# Patient Record
Sex: FEMALE | Race: WHITE | NOT HISPANIC OR LATINO | Employment: OTHER | URBAN - NONMETROPOLITAN AREA
[De-identification: names, ages, dates, MRNs, and addresses within clinical notes are randomized per-mention and may not be internally consistent; named-entity substitution may affect disease eponyms.]

---

## 2018-02-22 ENCOUNTER — HOSPITAL ENCOUNTER (EMERGENCY)
Facility: HOSPITAL | Age: 68
Discharge: HOME | End: 2018-02-22
Attending: EMERGENCY MEDICINE | Admitting: EMERGENCY MEDICINE

## 2018-02-22 ENCOUNTER — APPOINTMENT (OUTPATIENT)
Dept: GENERAL RADIOLOGY | Facility: HOSPITAL | Age: 68
End: 2018-02-22

## 2018-02-22 VITALS
RESPIRATION RATE: 16 BRPM | DIASTOLIC BLOOD PRESSURE: 82 MMHG | TEMPERATURE: 98 F | OXYGEN SATURATION: 98 % | SYSTOLIC BLOOD PRESSURE: 148 MMHG | HEART RATE: 72 BPM

## 2018-02-22 DIAGNOSIS — S20.212A CONTUSION OF RIB ON LEFT SIDE, INITIAL ENCOUNTER: ICD-10-CM
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
## 2018-02-22 PROCEDURE — 71101 X-RAY EXAM UNILAT RIBS/CHEST: CPT

## 2018-02-22 PROCEDURE — 99282 EMERGENCY DEPT VISIT SF MDM: CPT

## 2018-11-15 ENCOUNTER — APPOINTMENT (OUTPATIENT)
Dept: GENERAL RADIOLOGY | Facility: HOSPITAL | Age: 68
End: 2018-11-15

## 2018-11-15 ENCOUNTER — HOSPITAL ENCOUNTER (EMERGENCY)
Facility: HOSPITAL | Age: 68
Discharge: HOME | End: 2018-11-15
Attending: EMERGENCY MEDICINE | Admitting: EMERGENCY MEDICINE

## 2018-11-15 VITALS
TEMPERATURE: 97.7 F | RESPIRATION RATE: 18 BRPM | WEIGHT: 161.6 LBS | HEIGHT: 68 IN | DIASTOLIC BLOOD PRESSURE: 82 MMHG | SYSTOLIC BLOOD PRESSURE: 157 MMHG | HEART RATE: 80 BPM | OXYGEN SATURATION: 91 % | BODY MASS INDEX: 24.49 KG/M2

## 2018-11-15 DIAGNOSIS — S52.502A CLOSED FRACTURE OF DISTAL END OF LEFT RADIUS, UNSPECIFIED FRACTURE MORPHOLOGY, INITIAL ENCOUNTER: Primary | ICD-10-CM

## 2018-11-15 PROCEDURE — 99283 EMERGENCY DEPT VISIT LOW MDM: CPT

## 2018-11-15 PROCEDURE — 73110 X-RAY EXAM OF WRIST: CPT

## 2018-11-15 PROCEDURE — 73562 X-RAY EXAM OF KNEE 3: CPT

## 2018-11-15 RX ORDER — OXYCODONE HYDROCHLORIDE AND ACETAMINOPHEN 5; 325 MG/1; MG/1
1 TABLET ORAL ONCE
Status: COMPLETED | OUTPATIENT
Start: 2018-11-15 | End: 2018-11-15

## 2018-11-15 RX ORDER — LIDOCAINE HYDROCHLORIDE 10 MG/ML
10 INJECTION, SOLUTION INFILTRATION; PERINEURAL ONCE
Status: COMPLETED | OUTPATIENT
Start: 2018-11-15 | End: 2018-11-15

## 2018-11-15 RX ORDER — OXYCODONE HYDROCHLORIDE AND ACETAMINOPHEN 5; 325 MG/1; MG/1
1 TABLET ORAL EVERY 6 HOURS PRN
Qty: 12 TABLET | Refills: 0 | Status: ON HOLD | OUTPATIENT
Start: 2018-11-15 | End: 2019-09-13

## 2018-11-15 RX ADMIN — OXYCODONE HYDROCHLORIDE AND ACETAMINOPHEN 1 TABLET: 5; 325 TABLET ORAL at 17:07

## 2018-11-15 RX ADMIN — LIDOCAINE HYDROCHLORIDE 10 ML: 10 INJECTION, SOLUTION INFILTRATION; PERINEURAL at 18:06

## 2018-11-16 ENCOUNTER — HOSPITAL ENCOUNTER (OUTPATIENT)
Dept: GENERAL RADIOLOGY | Facility: HOSPITAL | Age: 68
Discharge: HOME | End: 2018-11-16
Admitting: PHYSICIAN ASSISTANT

## 2018-11-16 ENCOUNTER — ANESTHESIA EVENT (OUTPATIENT)
Dept: PERIOP | Facility: HOSPITAL | Age: 68
End: 2018-11-16

## 2018-11-16 ENCOUNTER — TELEPHONE (OUTPATIENT)
Dept: ORTHOPEDIC SURGERY | Facility: CLINIC | Age: 68
End: 2018-11-16

## 2018-11-16 ENCOUNTER — APPOINTMENT (OUTPATIENT)
Dept: PREADMISSION TESTING | Facility: HOSPITAL | Age: 68
End: 2018-11-16

## 2018-11-16 ENCOUNTER — OFFICE VISIT (OUTPATIENT)
Dept: ORTHOPEDIC SURGERY | Facility: CLINIC | Age: 68
End: 2018-11-16

## 2018-11-16 ENCOUNTER — PREP FOR SURGERY (OUTPATIENT)
Dept: OTHER | Facility: HOSPITAL | Age: 68
End: 2018-11-16

## 2018-11-16 VITALS
BODY MASS INDEX: 24.55 KG/M2 | DIASTOLIC BLOOD PRESSURE: 63 MMHG | OXYGEN SATURATION: 94 % | WEIGHT: 162 LBS | HEART RATE: 63 BPM | SYSTOLIC BLOOD PRESSURE: 127 MMHG | HEIGHT: 68 IN

## 2018-11-16 VITALS — HEIGHT: 68 IN | WEIGHT: 162 LBS | BODY MASS INDEX: 24.55 KG/M2 | RESPIRATION RATE: 18 BRPM

## 2018-11-16 DIAGNOSIS — S52.532A CLOSED COLLES' FRACTURE OF LEFT RADIUS, INITIAL ENCOUNTER: ICD-10-CM

## 2018-11-16 DIAGNOSIS — S69.92XA INJURY OF LEFT WRIST, INITIAL ENCOUNTER: ICD-10-CM
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
## 2018-11-16 DIAGNOSIS — S52.615A CLOSED NONDISPLACED FRACTURE OF STYLOID PROCESS OF LEFT ULNA, INITIAL ENCOUNTER: Primary | ICD-10-CM

## 2018-11-16 DIAGNOSIS — S52.532A CLOSED COLLES' FRACTURE OF LEFT RADIUS, INITIAL ENCOUNTER: Primary | ICD-10-CM

## 2018-11-16 LAB
ALBUMIN SERPL-MCNC: 4.8 G/DL (ref 3.5–5)
ALBUMIN/GLOB SERPL: 1.6 G/DL (ref 1–2)
ALP SERPL-CCNC: 116 U/L (ref 38–126)
ALT SERPL W P-5'-P-CCNC: 34 U/L (ref 13–69)
AMORPH URATE CRY URNS QL MICRO: ABNORMAL /HPF
ANION GAP SERPL CALCULATED.3IONS-SCNC: 10.2 MMOL/L (ref 10–20)
AST SERPL-CCNC: 38 U/L (ref 15–46)
BACTERIA UR QL AUTO: ABNORMAL /HPF
BASOPHILS # BLD AUTO: 0.05 10*3/MM3 (ref 0–0.2)
BASOPHILS NFR BLD AUTO: 0.5 % (ref 0–2.5)
BILIRUB SERPL-MCNC: 1.3 MG/DL (ref 0.2–1.3)
BILIRUB UR QL STRIP: ABNORMAL
BUN BLD-MCNC: 12 MG/DL (ref 7–20)
BUN/CREAT SERPL: 20 (ref 7.1–23.5)
CALCIUM SPEC-SCNC: 9.3 MG/DL (ref 8.4–10.2)
CHLORIDE SERPL-SCNC: 100 MMOL/L (ref 98–107)
CLARITY UR: ABNORMAL
CO2 SERPL-SCNC: 33 MMOL/L (ref 26–30)
COLOR UR: YELLOW
CREAT BLD-MCNC: 0.6 MG/DL (ref 0.6–1.3)
DEPRECATED RDW RBC AUTO: 44.5 FL (ref 37–54)
EOSINOPHIL # BLD AUTO: 0.13 10*3/MM3 (ref 0–0.7)
EOSINOPHIL NFR BLD AUTO: 1.3 % (ref 0–7)
ERYTHROCYTE [DISTWIDTH] IN BLOOD BY AUTOMATED COUNT: 13.7 % (ref 11.5–14.5)
GFR SERPL CREATININE-BSD FRML MDRD: 99 ML/MIN/1.73
GLOBULIN UR ELPH-MCNC: 3 GM/DL
GLUCOSE BLD-MCNC: 112 MG/DL (ref 74–98)
GLUCOSE UR STRIP-MCNC: NEGATIVE MG/DL
HCT VFR BLD AUTO: 47.3 % (ref 37–47)
HGB BLD-MCNC: 14.8 G/DL (ref 12–16)
HGB UR QL STRIP.AUTO: ABNORMAL
HYALINE CASTS UR QL AUTO: ABNORMAL /LPF
IMM GRANULOCYTES # BLD: 0.03 10*3/MM3 (ref 0–0.06)
IMM GRANULOCYTES NFR BLD: 0.3 % (ref 0–0.6)
KETONES UR QL STRIP: NEGATIVE
LEUKOCYTE ESTERASE UR QL STRIP.AUTO: NEGATIVE
LYMPHOCYTES # BLD AUTO: 0.96 10*3/MM3 (ref 0.6–3.4)
LYMPHOCYTES NFR BLD AUTO: 9.8 % (ref 10–50)
MCH RBC QN AUTO: 27.8 PG (ref 27–31)
MCHC RBC AUTO-ENTMCNC: 31.3 G/DL (ref 30–37)
MCV RBC AUTO: 88.9 FL (ref 81–99)
MONOCYTES # BLD AUTO: 0.68 10*3/MM3 (ref 0–0.9)
MONOCYTES NFR BLD AUTO: 6.9 % (ref 0–12)
NEUTROPHILS # BLD AUTO: 7.99 10*3/MM3 (ref 2–6.9)
NEUTROPHILS NFR BLD AUTO: 81.2 % (ref 37–80)
NITRITE UR QL STRIP: NEGATIVE
NRBC BLD MANUAL-RTO: 0 /100 WBC (ref 0–0)
PH UR STRIP.AUTO: 5.5 [PH] (ref 5–8)
PLATELET # BLD AUTO: 209 10*3/MM3 (ref 130–400)
PMV BLD AUTO: 10.7 FL (ref 6–12)
POTASSIUM BLD-SCNC: 4.2 MMOL/L (ref 3.5–5.1)
PROT SERPL-MCNC: 7.8 G/DL (ref 6.3–8.2)
PROT UR QL STRIP: NEGATIVE
RBC # BLD AUTO: 5.32 10*6/MM3 (ref 4.2–5.4)
RBC # UR: ABNORMAL /HPF
REF LAB TEST METHOD: ABNORMAL
SODIUM BLD-SCNC: 139 MMOL/L (ref 137–145)
SP GR UR STRIP: >=1.03 (ref 1–1.03)
SQUAMOUS #/AREA URNS HPF: ABNORMAL /HPF
UROBILINOGEN UR QL STRIP: ABNORMAL
WBC NRBC COR # BLD: 9.84 10*3/MM3 (ref 4.8–10.8)
WBC UR QL AUTO: ABNORMAL /HPF

## 2018-11-16 PROCEDURE — 71046 X-RAY EXAM CHEST 2 VIEWS: CPT

## 2018-11-16 PROCEDURE — 85025 COMPLETE CBC W/AUTO DIFF WBC: CPT | Performed by: PHYSICIAN ASSISTANT

## 2018-11-16 PROCEDURE — 80053 COMPREHEN METABOLIC PANEL: CPT | Performed by: PHYSICIAN ASSISTANT

## 2018-11-16 PROCEDURE — 99203 OFFICE O/P NEW LOW 30 MIN: CPT | Performed by: PHYSICIAN ASSISTANT

## 2018-11-16 PROCEDURE — 93005 ELECTROCARDIOGRAM TRACING: CPT | Performed by: PHYSICIAN ASSISTANT

## 2018-11-16 PROCEDURE — 87081 CULTURE SCREEN ONLY: CPT | Performed by: PHYSICIAN ASSISTANT

## 2018-11-16 PROCEDURE — 29105 APPLICATION LONG ARM SPLINT: CPT | Performed by: PHYSICIAN ASSISTANT

## 2018-11-16 PROCEDURE — 36415 COLL VENOUS BLD VENIPUNCTURE: CPT

## 2018-11-16 PROCEDURE — 81001 URINALYSIS AUTO W/SCOPE: CPT | Performed by: PHYSICIAN ASSISTANT

## 2018-11-16 RX ORDER — PAROXETINE HYDROCHLORIDE 20 MG/1
20 TABLET, FILM COATED ORAL EVERY MORNING
COMMUNITY
Start: 2012-06-04 | End: 2019-09-19 | Stop reason: SDUPTHER

## 2018-11-16 RX ORDER — WARFARIN SODIUM 2 MG/1
2 TABLET ORAL SEE ADMIN INSTRUCTIONS
Status: ON HOLD | COMMUNITY
End: 2019-09-17 | Stop reason: SDUPTHER

## 2018-11-16 RX ORDER — CEFAZOLIN SODIUM 2 G/50ML
2 SOLUTION INTRAVENOUS
Status: CANCELLED | OUTPATIENT
Start: 2018-11-20 | End: 2018-11-21

## 2018-11-18 LAB — MRSA SPEC QL CULT: NORMAL

## 2018-11-19 RX ORDER — HYDROCODONE BITARTRATE AND ACETAMINOPHEN 5; 325 MG/1; MG/1
1 TABLET ORAL EVERY 8 HOURS PRN
Qty: 21 TABLET | Refills: 0 | Status: SHIPPED | OUTPATIENT
Start: 2018-11-19 | End: 2018-12-04 | Stop reason: SDUPTHER

## 2018-11-20 ENCOUNTER — APPOINTMENT (OUTPATIENT)
Dept: GENERAL RADIOLOGY | Facility: HOSPITAL | Age: 68
End: 2018-11-20

## 2018-11-20 ENCOUNTER — ANESTHESIA (OUTPATIENT)
Dept: PERIOP | Facility: HOSPITAL | Age: 68
End: 2018-11-20

## 2018-11-20 ENCOUNTER — HOSPITAL ENCOUNTER (OUTPATIENT)
Facility: HOSPITAL | Age: 68
Setting detail: HOSPITAL OUTPATIENT SURGERY
Discharge: HOME | End: 2018-11-20
Attending: ORTHOPAEDIC SURGERY | Admitting: ORTHOPAEDIC SURGERY

## 2018-11-20 VITALS
DIASTOLIC BLOOD PRESSURE: 90 MMHG | TEMPERATURE: 98.1 F | SYSTOLIC BLOOD PRESSURE: 143 MMHG | HEART RATE: 55 BPM | RESPIRATION RATE: 16 BRPM | OXYGEN SATURATION: 96 %

## 2018-11-20 LAB
BACTERIA UR QL AUTO: ABNORMAL /HPF
BILIRUB UR QL STRIP: ABNORMAL
CLARITY UR: CLEAR
COLOR UR: YELLOW
GLUCOSE UR STRIP-MCNC: NEGATIVE MG/DL
HGB UR QL STRIP.AUTO: ABNORMAL
HYALINE CASTS UR QL AUTO: ABNORMAL /LPF
KETONES UR QL STRIP: NEGATIVE
LEUKOCYTE ESTERASE UR QL STRIP.AUTO: NEGATIVE
MUCOUS THREADS URNS QL MICRO: ABNORMAL /HPF
NITRITE UR QL STRIP: NEGATIVE
PH UR STRIP.AUTO: 5.5 [PH] (ref 5–8)
PROT UR QL STRIP: NEGATIVE
RBC # UR: ABNORMAL /HPF
REF LAB TEST METHOD: ABNORMAL
SP GR UR STRIP: >=1.03 (ref 1–1.03)
SQUAMOUS #/AREA URNS HPF: ABNORMAL /HPF
UROBILINOGEN UR QL STRIP: ABNORMAL
WBC UR QL AUTO: ABNORMAL /HPF

## 2018-11-20 PROCEDURE — 25010000002 FENTANYL CITRATE (PF) 100 MCG/2ML SOLUTION: Performed by: NURSE ANESTHETIST, CERTIFIED REGISTERED

## 2018-11-20 PROCEDURE — C1713 ANCHOR/SCREW BN/BN,TIS/BN: HCPCS | Performed by: ORTHOPAEDIC SURGERY

## 2018-11-20 PROCEDURE — 25606 PERQ SKEL FIXJ DSTL RDL FX: CPT | Performed by: ORTHOPAEDIC SURGERY

## 2018-11-20 PROCEDURE — 25010000003 CEFAZOLIN SODIUM-DEXTROSE 2-3 GM-%(50ML) RECONSTITUTED SOLUTION: Performed by: PHYSICIAN ASSISTANT

## 2018-11-20 PROCEDURE — 81001 URINALYSIS AUTO W/SCOPE: CPT | Performed by: ORTHOPAEDIC SURGERY

## 2018-11-20 PROCEDURE — 25010000002 PROPOFOL 1000 MG/ML EMULSION: Performed by: NURSE ANESTHETIST, CERTIFIED REGISTERED

## 2018-11-20 PROCEDURE — 76000 FLUOROSCOPY <1 HR PHYS/QHP: CPT

## 2018-11-20 PROCEDURE — 25010000002 MIDAZOLAM PER 1 MG: Performed by: NURSE ANESTHETIST, CERTIFIED REGISTERED

## 2018-11-20 DEVICE — KWIRE BAYO .062 9IN: Type: IMPLANTABLE DEVICE | Site: WRIST | Status: FUNCTIONAL

## 2018-11-20 RX ORDER — FENTANYL CITRATE 50 UG/ML
INJECTION, SOLUTION INTRAMUSCULAR; INTRAVENOUS
Status: COMPLETED
Start: 2018-11-20 | End: 2018-11-20

## 2018-11-20 RX ORDER — MIDAZOLAM HYDROCHLORIDE 1 MG/ML
INJECTION INTRAMUSCULAR; INTRAVENOUS
Status: COMPLETED
Start: 2018-11-20 | End: 2018-11-20

## 2018-11-20 RX ORDER — CEFAZOLIN SODIUM 2 G/50ML
2 SOLUTION INTRAVENOUS ONCE
Status: COMPLETED | OUTPATIENT
Start: 2018-11-20 | End: 2018-11-20

## 2018-11-20 RX ORDER — BUPIVACAINE HYDROCHLORIDE 5 MG/ML
INJECTION, SOLUTION EPIDURAL; INTRACAUDAL
Status: DISCONTINUED
Start: 2018-11-20 | End: 2018-11-20 | Stop reason: HOSPADM

## 2018-11-20 RX ORDER — OXYCODONE AND ACETAMINOPHEN 10; 325 MG/1; MG/1
1 TABLET ORAL ONCE AS NEEDED
Status: COMPLETED | OUTPATIENT
Start: 2018-11-20 | End: 2018-11-20

## 2018-11-20 RX ORDER — SODIUM CHLORIDE 0.9 % (FLUSH) 0.9 %
3 SYRINGE (ML) INJECTION AS NEEDED
Status: DISCONTINUED | OUTPATIENT
Start: 2018-11-20 | End: 2018-11-20 | Stop reason: HOSPADM

## 2018-11-20 RX ORDER — MIDAZOLAM HYDROCHLORIDE 1 MG/ML
INJECTION INTRAMUSCULAR; INTRAVENOUS AS NEEDED
Status: DISCONTINUED | OUTPATIENT
Start: 2018-11-20 | End: 2018-11-20 | Stop reason: SURG

## 2018-11-20 RX ORDER — FENTANYL CITRATE 50 UG/ML
INJECTION, SOLUTION INTRAMUSCULAR; INTRAVENOUS AS NEEDED
Status: DISCONTINUED | OUTPATIENT
Start: 2018-11-20 | End: 2018-11-20 | Stop reason: SURG

## 2018-11-20 RX ORDER — SODIUM CHLORIDE, SODIUM LACTATE, POTASSIUM CHLORIDE, CALCIUM CHLORIDE 600; 310; 30; 20 MG/100ML; MG/100ML; MG/100ML; MG/100ML
1000 INJECTION, SOLUTION INTRAVENOUS CONTINUOUS
Status: DISCONTINUED | OUTPATIENT
Start: 2018-11-20 | End: 2018-11-20 | Stop reason: HOSPADM

## 2018-11-20 RX ORDER — BUPIVACAINE HYDROCHLORIDE 5 MG/ML
INJECTION, SOLUTION EPIDURAL; INTRACAUDAL
Status: COMPLETED | OUTPATIENT
Start: 2018-11-20 | End: 2018-11-20

## 2018-11-20 RX ADMIN — SODIUM CHLORIDE, POTASSIUM CHLORIDE, SODIUM LACTATE AND CALCIUM CHLORIDE: 600; 310; 30; 20 INJECTION, SOLUTION INTRAVENOUS at 16:00

## 2018-11-20 RX ADMIN — FENTANYL CITRATE 50 MCG: 50 INJECTION, SOLUTION INTRAMUSCULAR; INTRAVENOUS at 14:43

## 2018-11-20 RX ADMIN — BUPIVACAINE HYDROCHLORIDE 20 ML: 5 INJECTION, SOLUTION EPIDURAL; INTRACAUDAL; PERINEURAL at 14:49

## 2018-11-20 RX ADMIN — MIDAZOLAM HYDROCHLORIDE 1 MG: 1 INJECTION, SOLUTION INTRAMUSCULAR; INTRAVENOUS at 15:48

## 2018-11-20 RX ADMIN — SODIUM CHLORIDE, POTASSIUM CHLORIDE, SODIUM LACTATE AND CALCIUM CHLORIDE 1000 ML: 600; 310; 30; 20 INJECTION, SOLUTION INTRAVENOUS at 12:30

## 2018-11-20 RX ADMIN — FENTANYL CITRATE 50 MCG: 50 INJECTION, SOLUTION INTRAMUSCULAR; INTRAVENOUS at 15:48

## 2018-11-20 RX ADMIN — CEFAZOLIN SODIUM 2 G: 2 SOLUTION INTRAVENOUS at 15:41

## 2018-11-20 RX ADMIN — PROPOFOL 100 MCG/KG/MIN: 10 INJECTION, EMULSION INTRAVENOUS at 15:43

## 2018-11-20 RX ADMIN — MIDAZOLAM HYDROCHLORIDE 1 MG: 1 INJECTION, SOLUTION INTRAMUSCULAR; INTRAVENOUS at 14:43

## 2018-11-20 RX ADMIN — OXYCODONE HYDROCHLORIDE AND ACETAMINOPHEN 1 TABLET: 10; 325 TABLET ORAL at 16:55

## 2018-11-20 RX ADMIN — FAMOTIDINE 20 MG: 10 INJECTION, SOLUTION INTRAVENOUS at 12:40

## 2018-11-27 ENCOUNTER — TELEPHONE (OUTPATIENT)
Dept: ORTHOPEDIC SURGERY | Facility: CLINIC | Age: 68
End: 2018-11-27

## 2018-11-27 ENCOUNTER — OFFICE VISIT (OUTPATIENT)
Dept: ORTHOPEDIC SURGERY | Facility: CLINIC | Age: 68
End: 2018-11-27

## 2018-11-27 VITALS — WEIGHT: 162 LBS | RESPIRATION RATE: 18 BRPM | HEIGHT: 68 IN | BODY MASS INDEX: 24.55 KG/M2

## 2018-11-27 DIAGNOSIS — M25.532 LEFT WRIST PAIN: Primary | ICD-10-CM

## 2018-11-27 DIAGNOSIS — S52.532A CLOSED COLLES' FRACTURE OF LEFT RADIUS, INITIAL ENCOUNTER: ICD-10-CM

## 2018-11-27 DIAGNOSIS — S52.615A CLOSED NONDISPLACED FRACTURE OF STYLOID PROCESS OF LEFT ULNA, INITIAL ENCOUNTER: ICD-10-CM

## 2018-11-27 PROCEDURE — 99024 POSTOP FOLLOW-UP VISIT: CPT | Performed by: PHYSICIAN ASSISTANT

## 2018-12-03 DIAGNOSIS — Z09 FRACTURE FOLLOW-UP: Primary | ICD-10-CM

## 2018-12-04 ENCOUNTER — OFFICE VISIT (OUTPATIENT)
Dept: ORTHOPEDIC SURGERY | Facility: CLINIC | Age: 68
End: 2018-12-04

## 2018-12-04 VITALS — BODY MASS INDEX: 24.55 KG/M2 | HEIGHT: 68 IN | WEIGHT: 162 LBS | RESPIRATION RATE: 18 BRPM

## 2018-12-04 DIAGNOSIS — Z79.2 NEED FOR PROPHYLACTIC ANTIBIOTIC: Primary | ICD-10-CM

## 2018-12-04 DIAGNOSIS — S52.532A CLOSED COLLES' FRACTURE OF LEFT RADIUS, INITIAL ENCOUNTER: ICD-10-CM

## 2018-12-04 DIAGNOSIS — S52.615A CLOSED NONDISPLACED FRACTURE OF STYLOID PROCESS OF LEFT ULNA, INITIAL ENCOUNTER: ICD-10-CM

## 2018-12-04 PROCEDURE — 29125 APPL SHORT ARM SPLINT STATIC: CPT | Performed by: PHYSICIAN ASSISTANT

## 2018-12-04 PROCEDURE — 99024 POSTOP FOLLOW-UP VISIT: CPT | Performed by: PHYSICIAN ASSISTANT

## 2018-12-04 RX ORDER — HYDROCODONE BITARTRATE AND ACETAMINOPHEN 5; 325 MG/1; MG/1
1 TABLET ORAL EVERY 12 HOURS PRN
Qty: 14 TABLET | Refills: 0 | Status: SHIPPED | OUTPATIENT
Start: 2018-12-04 | End: 2019-09-19

## 2018-12-04 RX ORDER — CEPHALEXIN 500 MG/1
500 CAPSULE ORAL EVERY 12 HOURS
Qty: 20 CAPSULE | Refills: 0 | Status: ON HOLD | OUTPATIENT
Start: 2018-12-04 | End: 2019-09-13

## 2018-12-21 DIAGNOSIS — S52.615A CLOSED NONDISPLACED FRACTURE OF STYLOID PROCESS OF LEFT ULNA, INITIAL ENCOUNTER: Primary | ICD-10-CM

## 2018-12-26 ENCOUNTER — OFFICE VISIT (OUTPATIENT)
Dept: ORTHOPEDIC SURGERY | Facility: CLINIC | Age: 68
End: 2018-12-26

## 2018-12-26 VITALS — HEIGHT: 68 IN | WEIGHT: 162 LBS | BODY MASS INDEX: 24.55 KG/M2 | RESPIRATION RATE: 18 BRPM

## 2018-12-26 DIAGNOSIS — S52.502D CLOSED FRACTURE OF DISTAL END OF LEFT RADIUS WITH ROUTINE HEALING, UNSPECIFIED FRACTURE MORPHOLOGY, SUBSEQUENT ENCOUNTER: Primary | ICD-10-CM

## 2018-12-26 DIAGNOSIS — M25.632 WRIST STIFFNESS, LEFT: ICD-10-CM

## 2018-12-26 PROCEDURE — 99024 POSTOP FOLLOW-UP VISIT: CPT | Performed by: PHYSICIAN ASSISTANT

## 2019-01-22 DIAGNOSIS — S52.502D CLOSED FRACTURE OF DISTAL END OF LEFT RADIUS WITH ROUTINE HEALING, UNSPECIFIED FRACTURE MORPHOLOGY, SUBSEQUENT ENCOUNTER: Primary | ICD-10-CM

## 2019-01-31 ENCOUNTER — OFFICE VISIT (OUTPATIENT)
Dept: ORTHOPEDIC SURGERY | Facility: CLINIC | Age: 69
End: 2019-01-31

## 2019-01-31 VITALS — RESPIRATION RATE: 18 BRPM | WEIGHT: 162 LBS | BODY MASS INDEX: 24.55 KG/M2 | HEIGHT: 68 IN

## 2019-01-31 DIAGNOSIS — S52.502D CLOSED FRACTURE OF DISTAL END OF LEFT RADIUS WITH ROUTINE HEALING, UNSPECIFIED FRACTURE MORPHOLOGY, SUBSEQUENT ENCOUNTER: Primary | ICD-10-CM

## 2019-01-31 PROCEDURE — 99024 POSTOP FOLLOW-UP VISIT: CPT | Performed by: PHYSICIAN ASSISTANT

## 2019-04-04 ENCOUNTER — HOSPITAL ENCOUNTER (EMERGENCY)
Facility: HOSPITAL | Age: 69
Discharge: HOME | End: 2019-04-04
Attending: EMERGENCY MEDICINE | Admitting: EMERGENCY MEDICINE

## 2019-04-04 VITALS
SYSTOLIC BLOOD PRESSURE: 133 MMHG | TEMPERATURE: 98 F | HEART RATE: 65 BPM | OXYGEN SATURATION: 90 % | HEIGHT: 68 IN | BODY MASS INDEX: 23.58 KG/M2 | RESPIRATION RATE: 16 BRPM | WEIGHT: 155.6 LBS | DIASTOLIC BLOOD PRESSURE: 61 MMHG

## 2019-04-04 DIAGNOSIS — S80.12XA CONTUSION OF LEFT LOWER EXTREMITY, INITIAL ENCOUNTER: Primary | ICD-10-CM

## 2019-04-04 DIAGNOSIS — R79.1 SUBTHERAPEUTIC INTERNATIONAL NORMALIZED RATIO (INR): ICD-10-CM

## 2019-04-04 LAB
INR PPP: 1.56 (ref 0.9–1.1)
PROTHROMBIN TIME: 19.1 SECONDS (ref 12–15.1)

## 2019-04-04 PROCEDURE — 85610 PROTHROMBIN TIME: CPT | Performed by: EMERGENCY MEDICINE

## 2019-04-04 PROCEDURE — 99283 EMERGENCY DEPT VISIT LOW MDM: CPT

## 2019-04-04 RX ORDER — WARFARIN SODIUM 2 MG/1
2 TABLET ORAL
Status: COMPLETED | OUTPATIENT
Start: 2019-04-04 | End: 2019-04-04

## 2019-04-04 RX ADMIN — WARFARIN SODIUM 2 MG: 2 TABLET ORAL at 16:30

## 2019-09-13 ENCOUNTER — HOSPITAL ENCOUNTER (INPATIENT)
Facility: HOSPITAL | Age: 69
LOS: 4 days | Discharge: HOME | End: 2019-09-17
Attending: EMERGENCY MEDICINE | Admitting: INTERNAL MEDICINE

## 2019-09-13 ENCOUNTER — APPOINTMENT (OUTPATIENT)
Dept: CT IMAGING | Facility: HOSPITAL | Age: 69
End: 2019-09-13

## 2019-09-13 ENCOUNTER — APPOINTMENT (OUTPATIENT)
Dept: GENERAL RADIOLOGY | Facility: HOSPITAL | Age: 69
End: 2019-09-13

## 2019-09-13 ENCOUNTER — APPOINTMENT (OUTPATIENT)
Dept: CARDIOLOGY | Facility: HOSPITAL | Age: 69
End: 2019-09-13

## 2019-09-13 ENCOUNTER — APPOINTMENT (OUTPATIENT)
Dept: ULTRASOUND IMAGING | Facility: HOSPITAL | Age: 69
End: 2019-09-13

## 2019-09-13 DIAGNOSIS — R79.1 SUBTHERAPEUTIC INTERNATIONAL NORMALIZED RATIO (INR): ICD-10-CM
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
## 2019-09-13 DIAGNOSIS — J44.9 COPD, SEVERITY TO BE DETERMINED (HCC): ICD-10-CM

## 2019-09-13 DIAGNOSIS — S22.050A CLOSED WEDGE COMPRESSION FRACTURE OF FIFTH THORACIC VERTEBRA, INITIAL ENCOUNTER: ICD-10-CM

## 2019-09-13 DIAGNOSIS — R06.02 SHORTNESS OF BREATH: ICD-10-CM

## 2019-09-13 DIAGNOSIS — J96.11 CHRONIC RESPIRATORY FAILURE WITH HYPOXIA (HCC): ICD-10-CM

## 2019-09-13 DIAGNOSIS — J44.9 COPD WITH HYPOXIA (HCC): ICD-10-CM

## 2019-09-13 DIAGNOSIS — T82.09XA: ICD-10-CM

## 2019-09-13 DIAGNOSIS — S22.000A CLOSED COMPRESSION FRACTURE OF THORACIC VERTEBRA, INITIAL ENCOUNTER (HCC): ICD-10-CM

## 2019-09-13 DIAGNOSIS — R77.8 ELEVATED TROPONIN: ICD-10-CM

## 2019-09-13 DIAGNOSIS — I21.4 NSTEMI (NON-ST ELEVATED MYOCARDIAL INFARCTION) (HCC): Primary | ICD-10-CM

## 2019-09-13 DIAGNOSIS — S42.292A HUMERAL HEAD FRACTURE, LEFT, CLOSED, INITIAL ENCOUNTER: ICD-10-CM

## 2019-09-13 DIAGNOSIS — R09.02 COPD WITH HYPOXIA (HCC): ICD-10-CM

## 2019-09-13 DIAGNOSIS — R09.02 HYPOXIA: ICD-10-CM

## 2019-09-13 PROBLEM — R79.89 ELEVATED TROPONIN: Status: ACTIVE | Noted: 2019-09-13

## 2019-09-13 LAB
ALBUMIN SERPL-MCNC: 4.6 G/DL (ref 3.5–5.2)
ALBUMIN/GLOB SERPL: 1.4 G/DL
ALP SERPL-CCNC: 115 U/L (ref 39–117)
ALT SERPL W P-5'-P-CCNC: 25 U/L (ref 1–33)
ANION GAP SERPL CALCULATED.3IONS-SCNC: 15.6 MMOL/L (ref 5–15)
APTT PPP: >200 SECONDS (ref 70–100)
AST SERPL-CCNC: 35 U/L (ref 1–32)
BASOPHILS # BLD AUTO: 0.04 10*3/MM3 (ref 0–0.2)
BASOPHILS NFR BLD AUTO: 0.3 % (ref 0–1.5)
BH CV ECHO MEAS - % IVS THICK: 44.4 %
BH CV ECHO MEAS - % LVPW THICK: 14 %
BH CV ECHO MEAS - AO ROOT AREA (BSA CORRECTED): 1.9
BH CV ECHO MEAS - AO ROOT AREA: 9.3 CM^2
BH CV ECHO MEAS - AO ROOT DIAM: 3.5 CM
BH CV ECHO MEAS - BSA(HAYCOCK): 1.9 M^2
BH CV ECHO MEAS - BSA(HAYCOCK): 1.9 M^2
BH CV ECHO MEAS - BSA: 1.8 M^2
BH CV ECHO MEAS - BSA: 1.8 M^2
BH CV ECHO MEAS - BZI_BMI: 24 KILOGRAMS/M^2
BH CV ECHO MEAS - BZI_BMI: 24 KILOGRAMS/M^2
BH CV ECHO MEAS - BZI_METRIC_HEIGHT: 172.7 CM
BH CV ECHO MEAS - BZI_METRIC_HEIGHT: 172.7 CM
BH CV ECHO MEAS - BZI_METRIC_WEIGHT: 71.7 KG
BH CV ECHO MEAS - BZI_METRIC_WEIGHT: 71.7 KG
BH CV ECHO MEAS - EDV(CUBED): 134.6 ML
BH CV ECHO MEAS - EDV(MOD-SP2): 74.9 ML
BH CV ECHO MEAS - EDV(MOD-SP4): 53.9 ML
BH CV ECHO MEAS - EDV(TEICH): 125.2 ML
BH CV ECHO MEAS - EF(CUBED): 60.5 %
BH CV ECHO MEAS - EF(MOD-BP): 57.8 %
BH CV ECHO MEAS - EF(MOD-SP2): 54.1 %
BH CV ECHO MEAS - EF(MOD-SP4): 57.5 %
BH CV ECHO MEAS - EF(TEICH): 51.8 %
BH CV ECHO MEAS - ESV(CUBED): 53.2 ML
BH CV ECHO MEAS - ESV(MOD-SP2): 34.4 ML
BH CV ECHO MEAS - ESV(MOD-SP4): 22.9 ML
BH CV ECHO MEAS - ESV(TEICH): 60.4 ML
BH CV ECHO MEAS - FS: 26.6 %
BH CV ECHO MEAS - IVS/LVPW: 1.3
BH CV ECHO MEAS - IVSD: 1 CM
BH CV ECHO MEAS - IVSS: 1.7 CM
BH CV ECHO MEAS - LA DIMENSION: 4.4 CM
BH CV ECHO MEAS - LA/AO: 1.3
BH CV ECHO MEAS - LAD MAJOR: 5.4 CM
BH CV ECHO MEAS - LAT PEAK E' VEL: 8.4 CM/SEC
BH CV ECHO MEAS - LATERAL E/E' RATIO: 9.4
BH CV ECHO MEAS - LV DIASTOLIC VOL/BSA (35-75): 29.1 ML/M^2
BH CV ECHO MEAS - LV MASS(C)D: 200.1 GRAMS
BH CV ECHO MEAS - LV MASS(C)DI: 108.2 GRAMS/M^2
BH CV ECHO MEAS - LV MASS(C)S: 183.8 GRAMS
BH CV ECHO MEAS - LV MASS(C)SI: 99.4 GRAMS/M^2
BH CV ECHO MEAS - LV SYSTOLIC VOL/BSA (12-30): 12.4 ML/M^2
BH CV ECHO MEAS - LVIDD: 5.1 CM
BH CV ECHO MEAS - LVIDS: 3.8 CM
BH CV ECHO MEAS - LVLD AP2: 6 CM
BH CV ECHO MEAS - LVLD AP4: 6.8 CM
BH CV ECHO MEAS - LVLS AP2: 6 CM
BH CV ECHO MEAS - LVLS AP4: 5.6 CM
BH CV ECHO MEAS - LVOT AREA (M): 3.1 CM^2
BH CV ECHO MEAS - LVOT AREA: 3.1 CM^2
BH CV ECHO MEAS - LVOT DIAM: 2 CM
BH CV ECHO MEAS - LVPWD: 0.91 CM
BH CV ECHO MEAS - LVPWS: 1 CM
BH CV ECHO MEAS - MED PEAK E' VEL: 4.9 CM/SEC
BH CV ECHO MEAS - MEDIAL E/E' RATIO: 16
BH CV ECHO MEAS - MV A MAX VEL: 121 CM/SEC
BH CV ECHO MEAS - MV DEC SLOPE: 429 CM/SEC^2
BH CV ECHO MEAS - MV DEC TIME: 0.18 SEC
BH CV ECHO MEAS - MV E MAX VEL: 78.6 CM/SEC
BH CV ECHO MEAS - MV E/A: 0.65
BH CV ECHO MEAS - MV MAX PG: 6.8 MMHG
BH CV ECHO MEAS - MV MEAN PG: 2 MMHG
BH CV ECHO MEAS - MV V2 MAX: 130 CM/SEC
BH CV ECHO MEAS - MV V2 MEAN: 67 CM/SEC
BH CV ECHO MEAS - MV V2 VTI: 22.3 CM
BH CV ECHO MEAS - PA ACC TIME: 0.08 SEC
BH CV ECHO MEAS - PA MAX PG: 1.3 MMHG
BH CV ECHO MEAS - PA MEAN PG: 1 MMHG
BH CV ECHO MEAS - PA PR(ACCEL): 42.6 MMHG
BH CV ECHO MEAS - PA V2 MAX: 56.2 CM/SEC
BH CV ECHO MEAS - PA V2 MEAN: 43.2 CM/SEC
BH CV ECHO MEAS - PA V2 VTI: 11.3 CM
BH CV ECHO MEAS - SI(CUBED): 44.1 ML/M^2
BH CV ECHO MEAS - SI(MOD-SP2): 21.9 ML/M^2
BH CV ECHO MEAS - SI(MOD-SP4): 16.8 ML/M^2
BH CV ECHO MEAS - SI(TEICH): 35.1 ML/M^2
BH CV ECHO MEAS - SV(CUBED): 81.5 ML
BH CV ECHO MEAS - SV(MOD-SP2): 40.5 ML
BH CV ECHO MEAS - SV(MOD-SP4): 31 ML
BH CV ECHO MEAS - SV(TEICH): 64.8 ML
BH CV ECHO MEAS - TAPSE (>1.6): 1.5 CM2
BH CV ECHO MEAS - TR MAX PG: 6 MMHG
BH CV ECHO MEAS - TR MAX VEL: 122.1 CM/SEC
BH CV ECHO MEASUREMENTS AVERAGE E/E' RATIO: 11.82
BILIRUB SERPL-MCNC: 0.9 MG/DL (ref 0.2–1.2)
BUN BLD-MCNC: 11 MG/DL (ref 8–23)
BUN/CREAT SERPL: 15.9 (ref 7–25)
CALCIUM SPEC-SCNC: 9.3 MG/DL (ref 8.6–10.5)
CHLORIDE SERPL-SCNC: 102 MMOL/L (ref 98–107)
CHOLEST SERPL-MCNC: 185 MG/DL (ref 0–200)
CO2 SERPL-SCNC: 27.4 MMOL/L (ref 22–29)
CREAT BLD-MCNC: 0.69 MG/DL (ref 0.57–1)
D DIMER PPP FEU-MCNC: 16.01 MCGFEU/ML (ref 0–0.57)
DEPRECATED RDW RBC AUTO: 43.1 FL (ref 37–54)
EOSINOPHIL # BLD AUTO: 0.03 10*3/MM3 (ref 0–0.4)
EOSINOPHIL NFR BLD AUTO: 0.2 % (ref 0.3–6.2)
ERYTHROCYTE [DISTWIDTH] IN BLOOD BY AUTOMATED COUNT: 13.3 % (ref 12.3–15.4)
GFR SERPL CREATININE-BSD FRML MDRD: 84 ML/MIN/1.73
GLOBULIN UR ELPH-MCNC: 3.4 GM/DL
GLUCOSE BLD-MCNC: 117 MG/DL (ref 65–99)
HBA1C MFR BLD: 5.7 % (ref 4.8–5.6)
HCT VFR BLD AUTO: 46.3 % (ref 34–46.6)
HDLC SERPL-MCNC: 55 MG/DL (ref 40–60)
HGB BLD-MCNC: 14.8 G/DL (ref 12–15.9)
IMM GRANULOCYTES # BLD AUTO: 0.05 10*3/MM3 (ref 0–0.05)
IMM GRANULOCYTES NFR BLD AUTO: 0.4 % (ref 0–0.5)
INR PPP: 1.63 (ref 0.9–1.1)
INR PPP: 1.98 (ref 0.9–1.1)
LDLC SERPL CALC-MCNC: 107 MG/DL (ref 0–100)
LDLC/HDLC SERPL: 1.94 {RATIO}
LV EF 2D ECHO EST: 58 %
LV EF 2D ECHO EST: 60 %
LYMPHOCYTES # BLD AUTO: 0.6 10*3/MM3 (ref 0.7–3.1)
LYMPHOCYTES NFR BLD AUTO: 4.8 % (ref 19.6–45.3)
MAXIMAL PREDICTED HEART RATE: 150 BPM
MCH RBC QN AUTO: 28 PG (ref 26.6–33)
MCHC RBC AUTO-ENTMCNC: 32 G/DL (ref 31.5–35.7)
MCV RBC AUTO: 87.7 FL (ref 79–97)
MONOCYTES # BLD AUTO: 0.77 10*3/MM3 (ref 0.1–0.9)
MONOCYTES NFR BLD AUTO: 6.2 % (ref 5–12)
NEUTROPHILS # BLD AUTO: 10.93 10*3/MM3 (ref 1.7–7)
NEUTROPHILS NFR BLD AUTO: 88.1 % (ref 42.7–76)
NRBC BLD AUTO-RTO: 0 /100 WBC (ref 0–0.2)
NT-PROBNP SERPL-MCNC: 365.5 PG/ML (ref 5–900)
PLATELET # BLD AUTO: 181 10*3/MM3 (ref 140–450)
PMV BLD AUTO: 10.5 FL (ref 6–12)
POTASSIUM BLD-SCNC: 3.9 MMOL/L (ref 3.5–5.2)
PROT SERPL-MCNC: 8 G/DL (ref 6–8.5)
PROTHROMBIN TIME: 19.7 SECONDS (ref 12–15.1)
PROTHROMBIN TIME: 23.1 SECONDS (ref 12–15.1)
RBC # BLD AUTO: 5.28 10*6/MM3 (ref 3.77–5.28)
SODIUM BLD-SCNC: 145 MMOL/L (ref 136–145)
STRESS TARGET HR: 128 BPM
TRIGL SERPL-MCNC: 117 MG/DL (ref 0–150)
TROPONIN T SERPL-MCNC: 0.02 NG/ML (ref 0–0.03)
TROPONIN T SERPL-MCNC: 0.05 NG/ML (ref 0–0.03)
TROPONIN T SERPL-MCNC: 0.07 NG/ML (ref 0–0.03)
TROPONIN T SERPL-MCNC: 0.1 NG/ML (ref 0–0.03)
TSH SERPL DL<=0.05 MIU/L-ACNC: 0.56 UIU/ML (ref 0.27–4.2)
VLDLC SERPL-MCNC: 23.4 MG/DL
WBC NRBC COR # BLD: 12.42 10*3/MM3 (ref 3.4–10.8)

## 2019-09-13 PROCEDURE — 84484 ASSAY OF TROPONIN QUANT: CPT | Performed by: EMERGENCY MEDICINE

## 2019-09-13 PROCEDURE — C1769 GUIDE WIRE: HCPCS | Performed by: INTERNAL MEDICINE

## 2019-09-13 PROCEDURE — 80061 LIPID PANEL: CPT | Performed by: FAMILY MEDICINE

## 2019-09-13 PROCEDURE — G0378 HOSPITAL OBSERVATION PER HR: HCPCS

## 2019-09-13 PROCEDURE — 93312 ECHO TRANSESOPHAGEAL: CPT | Performed by: INTERNAL MEDICINE

## 2019-09-13 PROCEDURE — 93306 TTE W/DOPPLER COMPLETE: CPT | Performed by: INTERNAL MEDICINE

## 2019-09-13 PROCEDURE — 85730 THROMBOPLASTIN TIME PARTIAL: CPT | Performed by: INTERNAL MEDICINE

## 2019-09-13 PROCEDURE — 25010000002 ONDANSETRON PER 1 MG: Performed by: FAMILY MEDICINE

## 2019-09-13 PROCEDURE — 71275 CT ANGIOGRAPHY CHEST: CPT

## 2019-09-13 PROCEDURE — 93307 TTE W/O DOPPLER COMPLETE: CPT | Performed by: INTERNAL MEDICINE

## 2019-09-13 PROCEDURE — 93320 DOPPLER ECHO COMPLETE: CPT

## 2019-09-13 PROCEDURE — 71045 X-RAY EXAM CHEST 1 VIEW: CPT

## 2019-09-13 PROCEDURE — 25010000002 FENTANYL CITRATE (PF) 100 MCG/2ML SOLUTION: Performed by: INTERNAL MEDICINE

## 2019-09-13 PROCEDURE — 25010000002 IOPAMIDOL 61 % SOLUTION: Performed by: EMERGENCY MEDICINE

## 2019-09-13 PROCEDURE — 4A023N7 MEASUREMENT OF CARDIAC SAMPLING AND PRESSURE, LEFT HEART, PERCUTANEOUS APPROACH: ICD-10-PCS | Performed by: INTERNAL MEDICINE

## 2019-09-13 PROCEDURE — B2111ZZ FLUOROSCOPY OF MULTIPLE CORONARY ARTERIES USING LOW OSMOLAR CONTRAST: ICD-10-PCS | Performed by: INTERNAL MEDICINE

## 2019-09-13 PROCEDURE — 93454 CORONARY ARTERY ANGIO S&I: CPT | Performed by: INTERNAL MEDICINE

## 2019-09-13 PROCEDURE — 84443 ASSAY THYROID STIM HORMONE: CPT | Performed by: FAMILY MEDICINE

## 2019-09-13 PROCEDURE — 93325 DOPPLER ECHO COLOR FLOW MAPG: CPT | Performed by: INTERNAL MEDICINE

## 2019-09-13 PROCEDURE — 93306 TTE W/DOPPLER COMPLETE: CPT

## 2019-09-13 PROCEDURE — 25010000003 LIDOCAINE 1 % SOLUTION: Performed by: INTERNAL MEDICINE

## 2019-09-13 PROCEDURE — 93880 EXTRACRANIAL BILAT STUDY: CPT

## 2019-09-13 PROCEDURE — 85610 PROTHROMBIN TIME: CPT | Performed by: INTERNAL MEDICINE

## 2019-09-13 PROCEDURE — 0 IOPAMIDOL PER 1 ML: Performed by: INTERNAL MEDICINE

## 2019-09-13 PROCEDURE — 85025 COMPLETE CBC W/AUTO DIFF WBC: CPT | Performed by: EMERGENCY MEDICINE

## 2019-09-13 PROCEDURE — 73030 X-RAY EXAM OF SHOULDER: CPT

## 2019-09-13 PROCEDURE — 99222 1ST HOSP IP/OBS MODERATE 55: CPT | Performed by: INTERNAL MEDICINE

## 2019-09-13 PROCEDURE — 80053 COMPREHEN METABOLIC PANEL: CPT | Performed by: EMERGENCY MEDICINE

## 2019-09-13 PROCEDURE — 83036 HEMOGLOBIN GLYCOSYLATED A1C: CPT | Performed by: FAMILY MEDICINE

## 2019-09-13 PROCEDURE — 94770: CPT

## 2019-09-13 PROCEDURE — 93325 DOPPLER ECHO COLOR FLOW MAPG: CPT

## 2019-09-13 PROCEDURE — 25010000002 HEPARIN (PORCINE) PER 1000 UNITS: Performed by: FAMILY MEDICINE

## 2019-09-13 PROCEDURE — 93312 ECHO TRANSESOPHAGEAL: CPT

## 2019-09-13 PROCEDURE — 84484 ASSAY OF TROPONIN QUANT: CPT | Performed by: FAMILY MEDICINE

## 2019-09-13 PROCEDURE — 99220 PR INITIAL OBSERVATION CARE/DAY 70 MINUTES: CPT | Performed by: FAMILY MEDICINE

## 2019-09-13 PROCEDURE — 3E073KZ INTRODUCTION OF OTHER DIAGNOSTIC SUBSTANCE INTO CORONARY ARTERY, PERCUTANEOUS APPROACH: ICD-10-PCS | Performed by: INTERNAL MEDICINE

## 2019-09-13 PROCEDURE — 25010000002 FENTANYL CITRATE (PF) 100 MCG/2ML SOLUTION: Performed by: EMERGENCY MEDICINE

## 2019-09-13 PROCEDURE — C1894 INTRO/SHEATH, NON-LASER: HCPCS | Performed by: INTERNAL MEDICINE

## 2019-09-13 PROCEDURE — 85610 PROTHROMBIN TIME: CPT | Performed by: EMERGENCY MEDICINE

## 2019-09-13 PROCEDURE — 73060 X-RAY EXAM OF HUMERUS: CPT

## 2019-09-13 PROCEDURE — C1887 CATHETER, GUIDING: HCPCS | Performed by: INTERNAL MEDICINE

## 2019-09-13 PROCEDURE — 83880 ASSAY OF NATRIURETIC PEPTIDE: CPT | Performed by: EMERGENCY MEDICINE

## 2019-09-13 PROCEDURE — 93571 IV DOP VEL&/PRESS C FLO 1ST: CPT | Performed by: INTERNAL MEDICINE

## 2019-09-13 PROCEDURE — 4A033BC MEASUREMENT OF ARTERIAL PRESSURE, CORONARY, PERCUTANEOUS APPROACH: ICD-10-PCS | Performed by: INTERNAL MEDICINE

## 2019-09-13 PROCEDURE — 93005 ELECTROCARDIOGRAM TRACING: CPT | Performed by: EMERGENCY MEDICINE

## 2019-09-13 PROCEDURE — 85379 FIBRIN DEGRADATION QUANT: CPT | Performed by: EMERGENCY MEDICINE

## 2019-09-13 PROCEDURE — 25010000002 MIDAZOLAM PER 1 MG: Performed by: INTERNAL MEDICINE

## 2019-09-13 PROCEDURE — 99285 EMERGENCY DEPT VISIT HI MDM: CPT

## 2019-09-13 PROCEDURE — 25010000002 HEPARIN (PORCINE) PER 1000 UNITS: Performed by: INTERNAL MEDICINE

## 2019-09-13 RX ORDER — PRAVASTATIN SODIUM 20 MG
80 TABLET ORAL NIGHTLY
Status: DISCONTINUED | OUTPATIENT
Start: 2019-09-13 | End: 2019-09-17 | Stop reason: HOSPADM

## 2019-09-13 RX ORDER — ONDANSETRON 2 MG/ML
4 INJECTION INTRAMUSCULAR; INTRAVENOUS EVERY 6 HOURS PRN
Status: DISCONTINUED | OUTPATIENT
Start: 2019-09-13 | End: 2019-09-17 | Stop reason: HOSPADM

## 2019-09-13 RX ORDER — LIDOCAINE HYDROCHLORIDE 10 MG/ML
INJECTION, SOLUTION INFILTRATION; PERINEURAL AS NEEDED
Status: DISCONTINUED | OUTPATIENT
Start: 2019-09-13 | End: 2019-09-13 | Stop reason: HOSPADM

## 2019-09-13 RX ORDER — HYDROCODONE BITARTRATE AND ACETAMINOPHEN 5; 325 MG/1; MG/1
1 TABLET ORAL EVERY 4 HOURS PRN
Status: DISCONTINUED | OUTPATIENT
Start: 2019-09-13 | End: 2019-09-17 | Stop reason: HOSPADM

## 2019-09-13 RX ORDER — FENTANYL CITRATE 50 UG/ML
INJECTION, SOLUTION INTRAMUSCULAR; INTRAVENOUS
Status: COMPLETED | OUTPATIENT
Start: 2019-09-13 | End: 2019-09-13

## 2019-09-13 RX ORDER — SODIUM CHLORIDE 9 MG/ML
40 INJECTION, SOLUTION INTRAVENOUS CONTINUOUS
Status: DISCONTINUED | OUTPATIENT
Start: 2019-09-13 | End: 2019-09-16

## 2019-09-13 RX ORDER — RAMIPRIL 5 MG/1
5 CAPSULE ORAL DAILY
Status: DISCONTINUED | OUTPATIENT
Start: 2019-09-13 | End: 2019-09-14

## 2019-09-13 RX ORDER — SODIUM CHLORIDE 9 MG/ML
100 INJECTION, SOLUTION INTRAVENOUS CONTINUOUS
Status: ACTIVE | OUTPATIENT
Start: 2019-09-13 | End: 2019-09-13

## 2019-09-13 RX ORDER — WARFARIN SODIUM 2 MG/1
5 TABLET ORAL
Status: DISCONTINUED | OUTPATIENT
Start: 2019-09-13 | End: 2019-09-15

## 2019-09-13 RX ORDER — MIDAZOLAM HYDROCHLORIDE 1 MG/ML
INJECTION INTRAMUSCULAR; INTRAVENOUS
Status: COMPLETED | OUTPATIENT
Start: 2019-09-13 | End: 2019-09-13

## 2019-09-13 RX ORDER — SODIUM CHLORIDE 0.9 % (FLUSH) 0.9 %
10 SYRINGE (ML) INJECTION EVERY 12 HOURS SCHEDULED
Status: DISCONTINUED | OUTPATIENT
Start: 2019-09-13 | End: 2019-09-17 | Stop reason: HOSPADM

## 2019-09-13 RX ORDER — TEMAZEPAM 15 MG/1
15 CAPSULE ORAL NIGHTLY PRN
Status: DISCONTINUED | OUTPATIENT
Start: 2019-09-13 | End: 2019-09-17 | Stop reason: HOSPADM

## 2019-09-13 RX ORDER — SODIUM CHLORIDE 0.9 % (FLUSH) 0.9 %
10 SYRINGE (ML) INJECTION AS NEEDED
Status: DISCONTINUED | OUTPATIENT
Start: 2019-09-13 | End: 2019-09-17 | Stop reason: HOSPADM

## 2019-09-13 RX ORDER — ONDANSETRON 4 MG/1
4 TABLET, FILM COATED ORAL EVERY 6 HOURS PRN
Status: DISCONTINUED | OUTPATIENT
Start: 2019-09-13 | End: 2019-09-17 | Stop reason: HOSPADM

## 2019-09-13 RX ORDER — METOPROLOL SUCCINATE 25 MG/1
25 TABLET, EXTENDED RELEASE ORAL NIGHTLY
Status: DISCONTINUED | OUTPATIENT
Start: 2019-09-13 | End: 2019-09-13

## 2019-09-13 RX ORDER — DIPHENHYDRAMINE HCL 25 MG
25 CAPSULE ORAL EVERY 6 HOURS PRN
Status: DISCONTINUED | OUTPATIENT
Start: 2019-09-13 | End: 2019-09-17 | Stop reason: HOSPADM

## 2019-09-13 RX ORDER — ONDANSETRON 2 MG/ML
4 INJECTION INTRAMUSCULAR; INTRAVENOUS EVERY 6 HOURS PRN
Status: DISCONTINUED | OUTPATIENT
Start: 2019-09-13 | End: 2019-09-17 | Stop reason: SDUPTHER

## 2019-09-13 RX ORDER — CHOLECALCIFEROL (VITAMIN D3) 125 MCG
5 CAPSULE ORAL NIGHTLY PRN
Status: DISCONTINUED | OUTPATIENT
Start: 2019-09-13 | End: 2019-09-17 | Stop reason: HOSPADM

## 2019-09-13 RX ORDER — IPRATROPIUM BROMIDE AND ALBUTEROL SULFATE 2.5; .5 MG/3ML; MG/3ML
3 SOLUTION RESPIRATORY (INHALATION) EVERY 4 HOURS PRN
Status: DISCONTINUED | OUTPATIENT
Start: 2019-09-13 | End: 2019-09-15

## 2019-09-13 RX ORDER — CALCIUM CARBONATE 200(500)MG
2 TABLET,CHEWABLE ORAL 3 TIMES DAILY PRN
Status: DISCONTINUED | OUTPATIENT
Start: 2019-09-13 | End: 2019-09-17 | Stop reason: HOSPADM

## 2019-09-13 RX ORDER — METOPROLOL SUCCINATE 25 MG/1
25 TABLET, EXTENDED RELEASE ORAL DAILY
Status: DISCONTINUED | OUTPATIENT
Start: 2019-09-13 | End: 2019-09-13

## 2019-09-13 RX ORDER — HEPARIN SODIUM 10000 [USP'U]/100ML
18 INJECTION, SOLUTION INTRAVENOUS
Status: DISCONTINUED | OUTPATIENT
Start: 2019-09-13 | End: 2019-09-17 | Stop reason: HOSPADM

## 2019-09-13 RX ORDER — PAROXETINE HYDROCHLORIDE 20 MG/1
20 TABLET, FILM COATED ORAL NIGHTLY
Status: DISCONTINUED | OUTPATIENT
Start: 2019-09-13 | End: 2019-09-17 | Stop reason: HOSPADM

## 2019-09-13 RX ORDER — HEPARIN SODIUM 1000 [USP'U]/ML
INJECTION, SOLUTION INTRAVENOUS; SUBCUTANEOUS AS NEEDED
Status: DISCONTINUED | OUTPATIENT
Start: 2019-09-13 | End: 2019-09-13 | Stop reason: HOSPADM

## 2019-09-13 RX ORDER — MIDAZOLAM HYDROCHLORIDE 1 MG/ML
INJECTION INTRAMUSCULAR; INTRAVENOUS AS NEEDED
Status: DISCONTINUED | OUTPATIENT
Start: 2019-09-13 | End: 2019-09-13 | Stop reason: HOSPADM

## 2019-09-13 RX ORDER — HEPARIN SODIUM 1000 [USP'U]/ML
80 INJECTION, SOLUTION INTRAVENOUS; SUBCUTANEOUS ONCE
Status: DISCONTINUED | OUTPATIENT
Start: 2019-09-13 | End: 2019-09-17 | Stop reason: HOSPADM

## 2019-09-13 RX ORDER — FAMOTIDINE 20 MG/1
40 TABLET, FILM COATED ORAL DAILY
Status: DISCONTINUED | OUTPATIENT
Start: 2019-09-13 | End: 2019-09-17 | Stop reason: HOSPADM

## 2019-09-13 RX ORDER — BUDESONIDE AND FORMOTEROL FUMARATE DIHYDRATE 80; 4.5 UG/1; UG/1
2 AEROSOL RESPIRATORY (INHALATION)
Status: DISCONTINUED | OUTPATIENT
Start: 2019-09-13 | End: 2019-09-15

## 2019-09-13 RX ORDER — HEPARIN SODIUM 10000 [USP'U]/100ML
18 INJECTION, SOLUTION INTRAVENOUS
Status: DISCONTINUED | OUTPATIENT
Start: 2019-09-13 | End: 2019-09-13

## 2019-09-13 RX ORDER — ASPIRIN 81 MG/1
81 TABLET, CHEWABLE ORAL DAILY
Status: DISCONTINUED | OUTPATIENT
Start: 2019-09-13 | End: 2019-09-17 | Stop reason: HOSPADM

## 2019-09-13 RX ORDER — ALPRAZOLAM 0.5 MG/1
0.5 TABLET ORAL 3 TIMES DAILY PRN
Status: DISCONTINUED | OUTPATIENT
Start: 2019-09-13 | End: 2019-09-17 | Stop reason: HOSPADM

## 2019-09-13 RX ORDER — PAROXETINE HYDROCHLORIDE 20 MG/1
20 TABLET, FILM COATED ORAL DAILY
Status: DISCONTINUED | OUTPATIENT
Start: 2019-09-13 | End: 2019-09-13

## 2019-09-13 RX ORDER — HEPARIN SODIUM 10000 [USP'U]/100ML
INJECTION, SOLUTION INTRAVENOUS
Status: COMPLETED | OUTPATIENT
Start: 2019-09-13 | End: 2019-09-13

## 2019-09-13 RX ORDER — HEPARIN SODIUM 1000 [USP'U]/ML
40 INJECTION, SOLUTION INTRAVENOUS; SUBCUTANEOUS AS NEEDED
Status: DISCONTINUED | OUTPATIENT
Start: 2019-09-13 | End: 2019-09-17 | Stop reason: HOSPADM

## 2019-09-13 RX ORDER — HEPARIN SODIUM 1000 [USP'U]/ML
5000 INJECTION, SOLUTION INTRAVENOUS; SUBCUTANEOUS AS NEEDED
Status: DISCONTINUED | OUTPATIENT
Start: 2019-09-13 | End: 2019-09-17 | Stop reason: HOSPADM

## 2019-09-13 RX ORDER — DOCUSATE SODIUM 100 MG/1
100 CAPSULE, LIQUID FILLED ORAL 2 TIMES DAILY PRN
Status: DISCONTINUED | OUTPATIENT
Start: 2019-09-13 | End: 2019-09-17 | Stop reason: HOSPADM

## 2019-09-13 RX ORDER — SENNA AND DOCUSATE SODIUM 50; 8.6 MG/1; MG/1
2 TABLET, FILM COATED ORAL NIGHTLY
Status: DISCONTINUED | OUTPATIENT
Start: 2019-09-13 | End: 2019-09-17 | Stop reason: HOSPADM

## 2019-09-13 RX ORDER — NALOXONE HCL 0.4 MG/ML
0.4 VIAL (ML) INJECTION
Status: DISCONTINUED | OUTPATIENT
Start: 2019-09-13 | End: 2019-09-17 | Stop reason: HOSPADM

## 2019-09-13 RX ORDER — FENTANYL CITRATE 50 UG/ML
INJECTION, SOLUTION INTRAMUSCULAR; INTRAVENOUS AS NEEDED
Status: DISCONTINUED | OUTPATIENT
Start: 2019-09-13 | End: 2019-09-13 | Stop reason: HOSPADM

## 2019-09-13 RX ORDER — FAMOTIDINE 10 MG/ML
20 INJECTION, SOLUTION INTRAVENOUS EVERY 12 HOURS SCHEDULED
Status: DISCONTINUED | OUTPATIENT
Start: 2019-09-13 | End: 2019-09-13 | Stop reason: SDUPTHER

## 2019-09-13 RX ORDER — FENTANYL CITRATE 50 UG/ML
25 INJECTION, SOLUTION INTRAMUSCULAR; INTRAVENOUS ONCE
Status: COMPLETED | OUTPATIENT
Start: 2019-09-13 | End: 2019-09-13

## 2019-09-13 RX ORDER — METOPROLOL SUCCINATE 100 MG/1
100 TABLET, EXTENDED RELEASE ORAL
Status: DISCONTINUED | OUTPATIENT
Start: 2019-09-13 | End: 2019-09-14

## 2019-09-13 RX ORDER — MORPHINE SULFATE 2 MG/ML
1 INJECTION, SOLUTION INTRAMUSCULAR; INTRAVENOUS EVERY 4 HOURS PRN
Status: DISCONTINUED | OUTPATIENT
Start: 2019-09-13 | End: 2019-09-17 | Stop reason: HOSPADM

## 2019-09-13 RX ADMIN — METOPROLOL SUCCINATE 100 MG: 100 TABLET, EXTENDED RELEASE ORAL at 20:09

## 2019-09-13 RX ADMIN — SODIUM CHLORIDE 100 ML/HR: 9 INJECTION, SOLUTION INTRAVENOUS at 23:57

## 2019-09-13 RX ADMIN — ONDANSETRON 4 MG: 2 INJECTION INTRAMUSCULAR; INTRAVENOUS at 11:56

## 2019-09-13 RX ADMIN — SODIUM CHLORIDE, PRESERVATIVE FREE 10 ML: 5 INJECTION INTRAVENOUS at 11:59

## 2019-09-13 RX ADMIN — HEPARIN SODIUM 9 ML/HR: 10000 INJECTION, SOLUTION INTRAVENOUS at 14:44

## 2019-09-13 RX ADMIN — SODIUM CHLORIDE 100 ML/HR: 9 INJECTION, SOLUTION INTRAVENOUS at 11:56

## 2019-09-13 RX ADMIN — MIDAZOLAM HYDROCHLORIDE 1 MG: 1 INJECTION, SOLUTION INTRAMUSCULAR; INTRAVENOUS at 14:30

## 2019-09-13 RX ADMIN — PRAVASTATIN SODIUM 80 MG: 20 TABLET ORAL at 20:08

## 2019-09-13 RX ADMIN — RAMIPRIL 5 MG: 5 CAPSULE ORAL at 16:31

## 2019-09-13 RX ADMIN — TOPICAL ANESTHETIC 2 SPRAY: 200 SPRAY DENTAL; PERIODONTAL at 14:16

## 2019-09-13 RX ADMIN — HEPARIN SODIUM 12.48 UNITS/KG/HR: 10000 INJECTION, SOLUTION INTRAVENOUS at 21:20

## 2019-09-13 RX ADMIN — SENNOSIDES AND DOCUSATE SODIUM 2 TABLET: 8.6; 5 TABLET ORAL at 20:09

## 2019-09-13 RX ADMIN — PAROXETINE HYDROCHLORIDE 20 MG: 20 TABLET, FILM COATED ORAL at 20:08

## 2019-09-13 RX ADMIN — WARFARIN SODIUM 5 MG: 2 TABLET ORAL at 20:09

## 2019-09-13 RX ADMIN — FENTANYL CITRATE 25 MCG: 50 INJECTION, SOLUTION INTRAMUSCULAR; INTRAVENOUS at 14:30

## 2019-09-13 RX ADMIN — IOPAMIDOL 100 ML: 612 INJECTION, SOLUTION INTRAVENOUS at 04:42

## 2019-09-13 RX ADMIN — FAMOTIDINE 40 MG: 20 TABLET ORAL at 11:56

## 2019-09-13 RX ADMIN — ASPIRIN 81 MG 81 MG: 81 TABLET ORAL at 16:31

## 2019-09-13 RX ADMIN — FENTANYL CITRATE 25 MCG: 50 INJECTION, SOLUTION INTRAMUSCULAR; INTRAVENOUS at 05:01

## 2019-09-14 ENCOUNTER — APPOINTMENT (OUTPATIENT)
Dept: CT IMAGING | Facility: HOSPITAL | Age: 69
End: 2019-09-14

## 2019-09-14 LAB
ANION GAP SERPL CALCULATED.3IONS-SCNC: 10.3 MMOL/L (ref 5–15)
APTT PPP: 135.8 SECONDS (ref 70–100)
APTT PPP: 69.4 SECONDS (ref 70–100)
APTT PPP: 86.6 SECONDS (ref 70–100)
BASOPHILS # BLD AUTO: 0.05 10*3/MM3 (ref 0–0.2)
BASOPHILS NFR BLD AUTO: 0.6 % (ref 0–1.5)
BUN BLD-MCNC: 8 MG/DL (ref 8–23)
BUN/CREAT SERPL: 15.1 (ref 7–25)
CALCIUM SPEC-SCNC: 8.2 MG/DL (ref 8.6–10.5)
CHLORIDE SERPL-SCNC: 105 MMOL/L (ref 98–107)
CO2 SERPL-SCNC: 25.7 MMOL/L (ref 22–29)
CREAT BLD-MCNC: 0.53 MG/DL (ref 0.57–1)
D DIMER PPP FEU-MCNC: 1.05 MCGFEU/ML (ref 0–0.57)
DEPRECATED RDW RBC AUTO: 44.8 FL (ref 37–54)
EOSINOPHIL # BLD AUTO: 0.1 10*3/MM3 (ref 0–0.4)
EOSINOPHIL NFR BLD AUTO: 1.2 % (ref 0.3–6.2)
ERYTHROCYTE [DISTWIDTH] IN BLOOD BY AUTOMATED COUNT: 13.4 % (ref 12.3–15.4)
GFR SERPL CREATININE-BSD FRML MDRD: 114 ML/MIN/1.73
GLUCOSE BLD-MCNC: 113 MG/DL (ref 65–99)
HCT VFR BLD AUTO: 39.1 % (ref 34–46.6)
HGB BLD-MCNC: 12.2 G/DL (ref 12–15.9)
IMM GRANULOCYTES # BLD AUTO: 0.02 10*3/MM3 (ref 0–0.05)
IMM GRANULOCYTES NFR BLD AUTO: 0.2 % (ref 0–0.5)
INR PPP: 2.29 (ref 0.9–1.1)
LYMPHOCYTES # BLD AUTO: 1.07 10*3/MM3 (ref 0.7–3.1)
LYMPHOCYTES NFR BLD AUTO: 12.4 % (ref 19.6–45.3)
MCH RBC QN AUTO: 28.3 PG (ref 26.6–33)
MCHC RBC AUTO-ENTMCNC: 31.2 G/DL (ref 31.5–35.7)
MCV RBC AUTO: 90.7 FL (ref 79–97)
MONOCYTES # BLD AUTO: 0.69 10*3/MM3 (ref 0.1–0.9)
MONOCYTES NFR BLD AUTO: 8 % (ref 5–12)
NEUTROPHILS # BLD AUTO: 6.7 10*3/MM3 (ref 1.7–7)
NEUTROPHILS NFR BLD AUTO: 77.6 % (ref 42.7–76)
NRBC BLD AUTO-RTO: 0 /100 WBC (ref 0–0.2)
PLATELET # BLD AUTO: 131 10*3/MM3 (ref 140–450)
PMV BLD AUTO: 10.7 FL (ref 6–12)
POTASSIUM BLD-SCNC: 4.4 MMOL/L (ref 3.5–5.2)
PROTHROMBIN TIME: 25.8 SECONDS (ref 12–15.1)
RBC # BLD AUTO: 4.31 10*6/MM3 (ref 3.77–5.28)
SODIUM BLD-SCNC: 141 MMOL/L (ref 136–145)
WBC NRBC COR # BLD: 8.63 10*3/MM3 (ref 3.4–10.8)

## 2019-09-14 PROCEDURE — 25010000002 HEPARIN (PORCINE) PER 1000 UNITS: Performed by: FAMILY MEDICINE

## 2019-09-14 PROCEDURE — 94640 AIRWAY INHALATION TREATMENT: CPT

## 2019-09-14 PROCEDURE — 85379 FIBRIN DEGRADATION QUANT: CPT | Performed by: INTERNAL MEDICINE

## 2019-09-14 PROCEDURE — 93005 ELECTROCARDIOGRAM TRACING: CPT | Performed by: INTERNAL MEDICINE

## 2019-09-14 PROCEDURE — 99232 SBSQ HOSP IP/OBS MODERATE 35: CPT | Performed by: INTERNAL MEDICINE

## 2019-09-14 PROCEDURE — 99231 SBSQ HOSP IP/OBS SF/LOW 25: CPT | Performed by: INTERNAL MEDICINE

## 2019-09-14 PROCEDURE — 94799 UNLISTED PULMONARY SVC/PX: CPT

## 2019-09-14 PROCEDURE — 85025 COMPLETE CBC W/AUTO DIFF WBC: CPT | Performed by: FAMILY MEDICINE

## 2019-09-14 PROCEDURE — 85730 THROMBOPLASTIN TIME PARTIAL: CPT | Performed by: INTERNAL MEDICINE

## 2019-09-14 PROCEDURE — 80048 BASIC METABOLIC PNL TOTAL CA: CPT | Performed by: FAMILY MEDICINE

## 2019-09-14 PROCEDURE — 85610 PROTHROMBIN TIME: CPT | Performed by: INTERNAL MEDICINE

## 2019-09-14 RX ORDER — CHLORTHALIDONE 25 MG/1
12.5 TABLET ORAL DAILY
Status: DISCONTINUED | OUTPATIENT
Start: 2019-09-14 | End: 2019-09-17 | Stop reason: HOSPADM

## 2019-09-14 RX ORDER — SPIRONOLACTONE 25 MG/1
12.5 TABLET ORAL DAILY
Status: DISCONTINUED | OUTPATIENT
Start: 2019-09-14 | End: 2019-09-17 | Stop reason: HOSPADM

## 2019-09-14 RX ORDER — BISOPROLOL FUMARATE 5 MG/1
20 TABLET, FILM COATED ORAL
Status: DISCONTINUED | OUTPATIENT
Start: 2019-09-15 | End: 2019-09-17 | Stop reason: HOSPADM

## 2019-09-14 RX ORDER — RAMIPRIL 5 MG/1
10 CAPSULE ORAL DAILY
Status: DISCONTINUED | OUTPATIENT
Start: 2019-09-15 | End: 2019-09-17 | Stop reason: HOSPADM

## 2019-09-14 RX ADMIN — ASPIRIN 81 MG 81 MG: 81 TABLET ORAL at 08:42

## 2019-09-14 RX ADMIN — CHLORTHALIDONE 12.5 MG: 25 TABLET ORAL at 11:47

## 2019-09-14 RX ADMIN — SPIRONOLACTONE 12.5 MG: 25 TABLET ORAL at 11:47

## 2019-09-14 RX ADMIN — SODIUM CHLORIDE, PRESERVATIVE FREE 10 ML: 5 INJECTION INTRAVENOUS at 08:42

## 2019-09-14 RX ADMIN — BUDESONIDE AND FORMOTEROL FUMARATE DIHYDRATE 2 PUFF: 80; 4.5 AEROSOL RESPIRATORY (INHALATION) at 19:33

## 2019-09-14 RX ADMIN — HEPARIN SODIUM 6 UNITS/KG/HR: 10000 INJECTION, SOLUTION INTRAVENOUS at 08:43

## 2019-09-14 RX ADMIN — SODIUM CHLORIDE 100 ML/HR: 9 INJECTION, SOLUTION INTRAVENOUS at 11:48

## 2019-09-14 RX ADMIN — IPRATROPIUM BROMIDE AND ALBUTEROL SULFATE 3 ML: .5; 3 SOLUTION RESPIRATORY (INHALATION) at 19:33

## 2019-09-14 RX ADMIN — IPRATROPIUM BROMIDE AND ALBUTEROL SULFATE 3 ML: .5; 3 SOLUTION RESPIRATORY (INHALATION) at 13:04

## 2019-09-14 RX ADMIN — RAMIPRIL 5 MG: 5 CAPSULE ORAL at 08:42

## 2019-09-14 RX ADMIN — SENNOSIDES AND DOCUSATE SODIUM 2 TABLET: 8.6; 5 TABLET ORAL at 20:15

## 2019-09-14 RX ADMIN — PRAVASTATIN SODIUM 80 MG: 20 TABLET ORAL at 20:14

## 2019-09-14 RX ADMIN — PAROXETINE HYDROCHLORIDE 20 MG: 20 TABLET, FILM COATED ORAL at 20:15

## 2019-09-14 RX ADMIN — FAMOTIDINE 40 MG: 20 TABLET ORAL at 08:42

## 2019-09-15 PROBLEM — S42.213A FRACTURE OF SURGICAL NECK OF HUMERUS: Status: ACTIVE | Noted: 2019-09-15

## 2019-09-15 LAB
ALBUMIN SERPL-MCNC: 3.4 G/DL (ref 3.5–5.2)
ALBUMIN/GLOB SERPL: 1.4 G/DL
ALP SERPL-CCNC: 70 U/L (ref 39–117)
ALT SERPL W P-5'-P-CCNC: 12 U/L (ref 1–33)
ANION GAP SERPL CALCULATED.3IONS-SCNC: 11.8 MMOL/L (ref 5–15)
APTT PPP: 74.4 SECONDS (ref 70–100)
AST SERPL-CCNC: 22 U/L (ref 1–32)
BASOPHILS # BLD AUTO: 0.03 10*3/MM3 (ref 0–0.2)
BASOPHILS NFR BLD AUTO: 0.5 % (ref 0–1.5)
BILIRUB SERPL-MCNC: 0.8 MG/DL (ref 0.2–1.2)
BUN BLD-MCNC: 6 MG/DL (ref 8–23)
BUN/CREAT SERPL: 12.2 (ref 7–25)
CALCIUM SPEC-SCNC: 8.1 MG/DL (ref 8.6–10.5)
CHLORIDE SERPL-SCNC: 106 MMOL/L (ref 98–107)
CO2 SERPL-SCNC: 25.2 MMOL/L (ref 22–29)
CREAT BLD-MCNC: 0.49 MG/DL (ref 0.57–1)
DEPRECATED RDW RBC AUTO: 45.1 FL (ref 37–54)
EOSINOPHIL # BLD AUTO: 0.14 10*3/MM3 (ref 0–0.4)
EOSINOPHIL NFR BLD AUTO: 2.2 % (ref 0.3–6.2)
ERYTHROCYTE [DISTWIDTH] IN BLOOD BY AUTOMATED COUNT: 13.6 % (ref 12.3–15.4)
GFR SERPL CREATININE-BSD FRML MDRD: 125 ML/MIN/1.73
GLOBULIN UR ELPH-MCNC: 2.4 GM/DL
GLUCOSE BLD-MCNC: 120 MG/DL (ref 65–99)
HCT VFR BLD AUTO: 35.6 % (ref 34–46.6)
HGB BLD-MCNC: 11.1 G/DL (ref 12–15.9)
IMM GRANULOCYTES # BLD AUTO: 0.04 10*3/MM3 (ref 0–0.05)
IMM GRANULOCYTES NFR BLD AUTO: 0.6 % (ref 0–0.5)
INR PPP: 2.48 (ref 0.9–1.1)
LYMPHOCYTES # BLD AUTO: 0.71 10*3/MM3 (ref 0.7–3.1)
LYMPHOCYTES NFR BLD AUTO: 11.3 % (ref 19.6–45.3)
MCH RBC QN AUTO: 28.3 PG (ref 26.6–33)
MCHC RBC AUTO-ENTMCNC: 31.2 G/DL (ref 31.5–35.7)
MCV RBC AUTO: 90.8 FL (ref 79–97)
MONOCYTES # BLD AUTO: 0.62 10*3/MM3 (ref 0.1–0.9)
MONOCYTES NFR BLD AUTO: 9.9 % (ref 5–12)
NEUTROPHILS # BLD AUTO: 4.74 10*3/MM3 (ref 1.7–7)
NEUTROPHILS NFR BLD AUTO: 75.5 % (ref 42.7–76)
NRBC BLD AUTO-RTO: 0 /100 WBC (ref 0–0.2)
PLATELET # BLD AUTO: 113 10*3/MM3 (ref 140–450)
PMV BLD AUTO: 11.4 FL (ref 6–12)
POTASSIUM BLD-SCNC: 3.8 MMOL/L (ref 3.5–5.2)
PROT SERPL-MCNC: 5.8 G/DL (ref 6–8.5)
PROTHROMBIN TIME: 27.5 SECONDS (ref 12–15.1)
RBC # BLD AUTO: 3.92 10*6/MM3 (ref 3.77–5.28)
SODIUM BLD-SCNC: 143 MMOL/L (ref 136–145)
WBC NRBC COR # BLD: 6.28 10*3/MM3 (ref 3.4–10.8)

## 2019-09-15 PROCEDURE — 94640 AIRWAY INHALATION TREATMENT: CPT

## 2019-09-15 PROCEDURE — 80053 COMPREHEN METABOLIC PANEL: CPT | Performed by: INTERNAL MEDICINE

## 2019-09-15 PROCEDURE — 99232 SBSQ HOSP IP/OBS MODERATE 35: CPT | Performed by: INTERNAL MEDICINE

## 2019-09-15 PROCEDURE — 85610 PROTHROMBIN TIME: CPT | Performed by: INTERNAL MEDICINE

## 2019-09-15 PROCEDURE — 85025 COMPLETE CBC W/AUTO DIFF WBC: CPT | Performed by: INTERNAL MEDICINE

## 2019-09-15 PROCEDURE — 94799 UNLISTED PULMONARY SVC/PX: CPT

## 2019-09-15 PROCEDURE — 99223 1ST HOSP IP/OBS HIGH 75: CPT | Performed by: INTERNAL MEDICINE

## 2019-09-15 PROCEDURE — 85730 THROMBOPLASTIN TIME PARTIAL: CPT | Performed by: FAMILY MEDICINE

## 2019-09-15 PROCEDURE — 25010000002 METHYLPREDNISOLONE PER 40 MG: Performed by: INTERNAL MEDICINE

## 2019-09-15 RX ORDER — IPRATROPIUM BROMIDE AND ALBUTEROL SULFATE 2.5; .5 MG/3ML; MG/3ML
3 SOLUTION RESPIRATORY (INHALATION)
Status: DISCONTINUED | OUTPATIENT
Start: 2019-09-15 | End: 2019-09-17 | Stop reason: HOSPADM

## 2019-09-15 RX ORDER — ARFORMOTEROL TARTRATE 15 UG/2ML
15 SOLUTION RESPIRATORY (INHALATION)
Status: DISCONTINUED | OUTPATIENT
Start: 2019-09-15 | End: 2019-09-17 | Stop reason: HOSPADM

## 2019-09-15 RX ORDER — METHYLPREDNISOLONE SODIUM SUCCINATE 40 MG/ML
40 INJECTION, POWDER, LYOPHILIZED, FOR SOLUTION INTRAMUSCULAR; INTRAVENOUS EVERY 6 HOURS
Status: DISCONTINUED | OUTPATIENT
Start: 2019-09-15 | End: 2019-09-16

## 2019-09-15 RX ORDER — BUDESONIDE 0.5 MG/2ML
0.5 INHALANT ORAL
Status: DISCONTINUED | OUTPATIENT
Start: 2019-09-15 | End: 2019-09-17 | Stop reason: HOSPADM

## 2019-09-15 RX ORDER — AMLODIPINE BESYLATE 5 MG/1
5 TABLET ORAL
Status: DISCONTINUED | OUTPATIENT
Start: 2019-09-15 | End: 2019-09-17 | Stop reason: HOSPADM

## 2019-09-15 RX ORDER — WARFARIN SODIUM 5 MG/1
5 TABLET ORAL
Status: DISCONTINUED | OUTPATIENT
Start: 2019-09-15 | End: 2019-09-17 | Stop reason: HOSPADM

## 2019-09-15 RX ADMIN — ARFORMOTEROL TARTRATE 15 MCG: 15 SOLUTION RESPIRATORY (INHALATION) at 19:37

## 2019-09-15 RX ADMIN — SPIRONOLACTONE 12.5 MG: 25 TABLET ORAL at 08:30

## 2019-09-15 RX ADMIN — RAMIPRIL 10 MG: 5 CAPSULE ORAL at 08:30

## 2019-09-15 RX ADMIN — PRAVASTATIN SODIUM 80 MG: 20 TABLET ORAL at 20:29

## 2019-09-15 RX ADMIN — SODIUM CHLORIDE 100 ML/HR: 9 INJECTION, SOLUTION INTRAVENOUS at 10:46

## 2019-09-15 RX ADMIN — FAMOTIDINE 40 MG: 20 TABLET ORAL at 08:30

## 2019-09-15 RX ADMIN — ASPIRIN 81 MG 81 MG: 81 TABLET ORAL at 08:30

## 2019-09-15 RX ADMIN — AMLODIPINE BESYLATE 5 MG: 5 TABLET ORAL at 14:47

## 2019-09-15 RX ADMIN — CHLORTHALIDONE 12.5 MG: 25 TABLET ORAL at 08:30

## 2019-09-15 RX ADMIN — METHYLPREDNISOLONE SODIUM SUCCINATE 40 MG: 40 INJECTION, POWDER, FOR SOLUTION INTRAMUSCULAR; INTRAVENOUS at 20:28

## 2019-09-15 RX ADMIN — BUDESONIDE AND FORMOTEROL FUMARATE DIHYDRATE 2 PUFF: 80; 4.5 AEROSOL RESPIRATORY (INHALATION) at 07:15

## 2019-09-15 RX ADMIN — WARFARIN SODIUM 5 MG: 5 TABLET ORAL at 18:27

## 2019-09-15 RX ADMIN — METHYLPREDNISOLONE SODIUM SUCCINATE 40 MG: 40 INJECTION, POWDER, FOR SOLUTION INTRAMUSCULAR; INTRAVENOUS at 14:47

## 2019-09-15 RX ADMIN — BUDESONIDE 0.5 MG: 0.5 INHALANT RESPIRATORY (INHALATION) at 19:37

## 2019-09-15 RX ADMIN — BISOPROLOL FUMARATE 20 MG: 5 TABLET ORAL at 08:29

## 2019-09-15 RX ADMIN — PAROXETINE HYDROCHLORIDE 20 MG: 20 TABLET, FILM COATED ORAL at 20:29

## 2019-09-16 LAB
APTT PPP: 68.6 SECONDS (ref 70–100)
APTT PPP: 81.2 SECONDS (ref 70–100)
APTT PPP: 86.5 SECONDS (ref 70–100)
BASOPHILS # BLD AUTO: 0.01 10*3/MM3 (ref 0–0.2)
BASOPHILS NFR BLD AUTO: 0.1 % (ref 0–1.5)
DEPRECATED RDW RBC AUTO: 42.1 FL (ref 37–54)
EOSINOPHIL # BLD AUTO: 0 10*3/MM3 (ref 0–0.4)
EOSINOPHIL NFR BLD AUTO: 0 % (ref 0.3–6.2)
ERYTHROCYTE [DISTWIDTH] IN BLOOD BY AUTOMATED COUNT: 13.1 % (ref 12.3–15.4)
HCT VFR BLD AUTO: 36 % (ref 34–46.6)
HGB BLD-MCNC: 11.6 G/DL (ref 12–15.9)
IMM GRANULOCYTES # BLD AUTO: 0.12 10*3/MM3 (ref 0–0.05)
IMM GRANULOCYTES NFR BLD AUTO: 1 % (ref 0–0.5)
INR PPP: 2.17 (ref 0.9–1.1)
LYMPHOCYTES # BLD AUTO: 0.67 10*3/MM3 (ref 0.7–3.1)
LYMPHOCYTES NFR BLD AUTO: 5.4 % (ref 19.6–45.3)
MCH RBC QN AUTO: 28.2 PG (ref 26.6–33)
MCHC RBC AUTO-ENTMCNC: 32.2 G/DL (ref 31.5–35.7)
MCV RBC AUTO: 87.4 FL (ref 79–97)
MONOCYTES # BLD AUTO: 0.48 10*3/MM3 (ref 0.1–0.9)
MONOCYTES NFR BLD AUTO: 3.8 % (ref 5–12)
NEUTROPHILS # BLD AUTO: 11.19 10*3/MM3 (ref 1.7–7)
NEUTROPHILS NFR BLD AUTO: 89.7 % (ref 42.7–76)
NRBC BLD AUTO-RTO: 0 /100 WBC (ref 0–0.2)
PLATELET # BLD AUTO: 144 10*3/MM3 (ref 140–450)
PMV BLD AUTO: 11.4 FL (ref 6–12)
PROTHROMBIN TIME: 24.7 SECONDS (ref 12–15.1)
RBC # BLD AUTO: 4.12 10*6/MM3 (ref 3.77–5.28)
WBC NRBC COR # BLD: 12.47 10*3/MM3 (ref 3.4–10.8)

## 2019-09-16 PROCEDURE — 94799 UNLISTED PULMONARY SVC/PX: CPT

## 2019-09-16 PROCEDURE — 85025 COMPLETE CBC W/AUTO DIFF WBC: CPT | Performed by: INTERNAL MEDICINE

## 2019-09-16 PROCEDURE — 25010000002 HEPARIN (PORCINE) PER 1000 UNITS: Performed by: FAMILY MEDICINE

## 2019-09-16 PROCEDURE — 99231 SBSQ HOSP IP/OBS SF/LOW 25: CPT | Performed by: INTERNAL MEDICINE

## 2019-09-16 PROCEDURE — 85730 THROMBOPLASTIN TIME PARTIAL: CPT | Performed by: INTERNAL MEDICINE

## 2019-09-16 PROCEDURE — 99232 SBSQ HOSP IP/OBS MODERATE 35: CPT | Performed by: INTERNAL MEDICINE

## 2019-09-16 PROCEDURE — 94640 AIRWAY INHALATION TREATMENT: CPT

## 2019-09-16 PROCEDURE — 85610 PROTHROMBIN TIME: CPT | Performed by: INTERNAL MEDICINE

## 2019-09-16 PROCEDURE — 85730 THROMBOPLASTIN TIME PARTIAL: CPT | Performed by: FAMILY MEDICINE

## 2019-09-16 PROCEDURE — 25010000002 METHYLPREDNISOLONE PER 40 MG: Performed by: INTERNAL MEDICINE

## 2019-09-16 RX ORDER — METHYLPREDNISOLONE SODIUM SUCCINATE 40 MG/ML
40 INJECTION, POWDER, LYOPHILIZED, FOR SOLUTION INTRAMUSCULAR; INTRAVENOUS EVERY 8 HOURS
Status: DISCONTINUED | OUTPATIENT
Start: 2019-09-16 | End: 2019-09-17

## 2019-09-16 RX ADMIN — SENNOSIDES AND DOCUSATE SODIUM 2 TABLET: 8.6; 5 TABLET ORAL at 20:20

## 2019-09-16 RX ADMIN — ARFORMOTEROL TARTRATE 15 MCG: 15 SOLUTION RESPIRATORY (INHALATION) at 19:16

## 2019-09-16 RX ADMIN — PAROXETINE HYDROCHLORIDE 20 MG: 20 TABLET, FILM COATED ORAL at 20:20

## 2019-09-16 RX ADMIN — SPIRONOLACTONE 12.5 MG: 25 TABLET ORAL at 09:12

## 2019-09-16 RX ADMIN — RAMIPRIL 10 MG: 5 CAPSULE ORAL at 09:12

## 2019-09-16 RX ADMIN — BISOPROLOL FUMARATE 20 MG: 5 TABLET ORAL at 09:09

## 2019-09-16 RX ADMIN — HYDROCODONE BITARTRATE AND ACETAMINOPHEN 1 TABLET: 5; 325 TABLET ORAL at 20:20

## 2019-09-16 RX ADMIN — AMLODIPINE BESYLATE 5 MG: 5 TABLET ORAL at 09:11

## 2019-09-16 RX ADMIN — IPRATROPIUM BROMIDE AND ALBUTEROL SULFATE 3 ML: .5; 3 SOLUTION RESPIRATORY (INHALATION) at 19:17

## 2019-09-16 RX ADMIN — IPRATROPIUM BROMIDE AND ALBUTEROL SULFATE 3 ML: .5; 3 SOLUTION RESPIRATORY (INHALATION) at 06:58

## 2019-09-16 RX ADMIN — SODIUM CHLORIDE, PRESERVATIVE FREE 10 ML: 5 INJECTION INTRAVENOUS at 09:14

## 2019-09-16 RX ADMIN — WARFARIN SODIUM 5 MG: 5 TABLET ORAL at 17:01

## 2019-09-16 RX ADMIN — IPRATROPIUM BROMIDE AND ALBUTEROL SULFATE 3 ML: .5; 3 SOLUTION RESPIRATORY (INHALATION) at 12:23

## 2019-09-16 RX ADMIN — CHLORTHALIDONE 12.5 MG: 25 TABLET ORAL at 09:12

## 2019-09-16 RX ADMIN — ASPIRIN 81 MG 81 MG: 81 TABLET ORAL at 09:12

## 2019-09-16 RX ADMIN — METHYLPREDNISOLONE SODIUM SUCCINATE 40 MG: 40 INJECTION, POWDER, FOR SOLUTION INTRAMUSCULAR; INTRAVENOUS at 03:20

## 2019-09-16 RX ADMIN — METHYLPREDNISOLONE SODIUM SUCCINATE 40 MG: 40 INJECTION, POWDER, FOR SOLUTION INTRAMUSCULAR; INTRAVENOUS at 16:20

## 2019-09-16 RX ADMIN — METHYLPREDNISOLONE SODIUM SUCCINATE 40 MG: 40 INJECTION, POWDER, FOR SOLUTION INTRAMUSCULAR; INTRAVENOUS at 09:09

## 2019-09-16 RX ADMIN — ALPRAZOLAM 0.5 MG: 0.5 TABLET ORAL at 16:20

## 2019-09-16 RX ADMIN — FAMOTIDINE 40 MG: 20 TABLET ORAL at 09:12

## 2019-09-16 RX ADMIN — ARFORMOTEROL TARTRATE 15 MCG: 15 SOLUTION RESPIRATORY (INHALATION) at 06:59

## 2019-09-16 RX ADMIN — SODIUM CHLORIDE, PRESERVATIVE FREE 10 ML: 5 INJECTION INTRAVENOUS at 20:21

## 2019-09-16 RX ADMIN — HYDROCODONE BITARTRATE AND ACETAMINOPHEN 1 TABLET: 5; 325 TABLET ORAL at 16:21

## 2019-09-16 RX ADMIN — HEPARIN SODIUM 2880 UNITS: 1000 INJECTION, SOLUTION INTRAVENOUS; SUBCUTANEOUS at 06:52

## 2019-09-16 RX ADMIN — HEPARIN SODIUM 8 UNITS/KG/HR: 10000 INJECTION, SOLUTION INTRAVENOUS at 06:53

## 2019-09-16 RX ADMIN — BUDESONIDE 0.5 MG: 0.5 INHALANT RESPIRATORY (INHALATION) at 19:17

## 2019-09-16 RX ADMIN — PRAVASTATIN SODIUM 80 MG: 20 TABLET ORAL at 20:20

## 2019-09-16 RX ADMIN — BUDESONIDE 0.5 MG: 0.5 INHALANT RESPIRATORY (INHALATION) at 06:59

## 2019-09-17 VITALS
OXYGEN SATURATION: 92 % | HEIGHT: 68 IN | BODY MASS INDEX: 25.76 KG/M2 | WEIGHT: 170 LBS | SYSTOLIC BLOOD PRESSURE: 142 MMHG | TEMPERATURE: 97.7 F | DIASTOLIC BLOOD PRESSURE: 56 MMHG | RESPIRATION RATE: 18 BRPM | HEART RATE: 71 BPM

## 2019-09-17 LAB
APTT PPP: 105.9 SECONDS (ref 70–100)
APTT PPP: 82.5 SECONDS (ref 70–100)
INR PPP: 3.51 (ref 0.9–1.1)
PROTHROMBIN TIME: 36 SECONDS (ref 12–15.1)

## 2019-09-17 PROCEDURE — 99232 SBSQ HOSP IP/OBS MODERATE 35: CPT | Performed by: NURSE PRACTITIONER

## 2019-09-17 PROCEDURE — 25010000002 METHYLPREDNISOLONE PER 40 MG: Performed by: INTERNAL MEDICINE

## 2019-09-17 PROCEDURE — 85730 THROMBOPLASTIN TIME PARTIAL: CPT | Performed by: INTERNAL MEDICINE

## 2019-09-17 PROCEDURE — 99239 HOSP IP/OBS DSCHRG MGMT >30: CPT | Performed by: INTERNAL MEDICINE

## 2019-09-17 PROCEDURE — 94618 PULMONARY STRESS TESTING: CPT

## 2019-09-17 PROCEDURE — 94799 UNLISTED PULMONARY SVC/PX: CPT

## 2019-09-17 PROCEDURE — 85610 PROTHROMBIN TIME: CPT | Performed by: INTERNAL MEDICINE

## 2019-09-17 RX ORDER — RAMIPRIL 10 MG/1
10 CAPSULE ORAL DAILY
Qty: 30 CAPSULE | Refills: 0 | Status: SHIPPED | OUTPATIENT
Start: 2019-09-18 | End: 2019-10-15 | Stop reason: SDUPTHER

## 2019-09-17 RX ORDER — BUDESONIDE AND FORMOTEROL FUMARATE DIHYDRATE 160; 4.5 UG/1; UG/1
2 AEROSOL RESPIRATORY (INHALATION) 2 TIMES DAILY
Qty: 10.2 G | Refills: 5 | Status: SHIPPED | OUTPATIENT
Start: 2019-09-17 | End: 2019-10-21

## 2019-09-17 RX ORDER — PRAVASTATIN SODIUM 80 MG/1
80 TABLET ORAL NIGHTLY
Qty: 30 TABLET | Refills: 0 | Status: SHIPPED | OUTPATIENT
Start: 2019-09-17 | End: 2019-10-15 | Stop reason: SDUPTHER

## 2019-09-17 RX ORDER — CHLORTHALIDONE 25 MG/1
12.5 TABLET ORAL DAILY
Qty: 30 TABLET | Refills: 0 | Status: SHIPPED | OUTPATIENT
Start: 2019-09-18 | End: 2019-10-15 | Stop reason: SDUPTHER

## 2019-09-17 RX ORDER — WARFARIN SODIUM 2 MG/1
2 TABLET ORAL SEE ADMIN INSTRUCTIONS
Qty: 60 TABLET | Refills: 0 | Status: SHIPPED | OUTPATIENT
Start: 2019-09-17 | End: 2020-06-19 | Stop reason: SDUPTHER

## 2019-09-17 RX ORDER — SPIRONOLACTONE 25 MG/1
12.5 TABLET ORAL DAILY
Qty: 30 TABLET | Refills: 0 | Status: SHIPPED | OUTPATIENT
Start: 2019-09-18 | End: 2019-10-15 | Stop reason: SDUPTHER

## 2019-09-17 RX ORDER — BISOPROLOL FUMARATE 10 MG/1
20 TABLET, FILM COATED ORAL
Qty: 60 TABLET | Refills: 0 | Status: SHIPPED | OUTPATIENT
Start: 2019-09-18 | End: 2019-10-03

## 2019-09-17 RX ORDER — AMLODIPINE BESYLATE 5 MG/1
5 TABLET ORAL
Qty: 30 TABLET | Refills: 0 | Status: SHIPPED | OUTPATIENT
Start: 2019-09-18 | End: 2019-09-19

## 2019-09-17 RX ORDER — METHYLPREDNISOLONE SODIUM SUCCINATE 40 MG/ML
40 INJECTION, POWDER, LYOPHILIZED, FOR SOLUTION INTRAMUSCULAR; INTRAVENOUS EVERY 12 HOURS
Status: DISCONTINUED | OUTPATIENT
Start: 2019-09-17 | End: 2019-09-17 | Stop reason: HOSPADM

## 2019-09-17 RX ORDER — ASPIRIN 81 MG/1
81 TABLET, CHEWABLE ORAL DAILY
Qty: 30 TABLET | Refills: 0 | Status: SHIPPED | OUTPATIENT
Start: 2019-09-18 | End: 2019-10-21

## 2019-09-17 RX ADMIN — ASPIRIN 81 MG 81 MG: 81 TABLET ORAL at 08:20

## 2019-09-17 RX ADMIN — IPRATROPIUM BROMIDE AND ALBUTEROL SULFATE 3 ML: .5; 3 SOLUTION RESPIRATORY (INHALATION) at 06:46

## 2019-09-17 RX ADMIN — METHYLPREDNISOLONE SODIUM SUCCINATE 40 MG: 40 INJECTION, POWDER, FOR SOLUTION INTRAMUSCULAR; INTRAVENOUS at 08:21

## 2019-09-17 RX ADMIN — BISOPROLOL FUMARATE 20 MG: 5 TABLET ORAL at 08:21

## 2019-09-17 RX ADMIN — METHYLPREDNISOLONE SODIUM SUCCINATE 40 MG: 40 INJECTION, POWDER, FOR SOLUTION INTRAMUSCULAR; INTRAVENOUS at 02:28

## 2019-09-17 RX ADMIN — ARFORMOTEROL TARTRATE 15 MCG: 15 SOLUTION RESPIRATORY (INHALATION) at 06:46

## 2019-09-17 RX ADMIN — FAMOTIDINE 40 MG: 20 TABLET ORAL at 08:20

## 2019-09-17 RX ADMIN — HYDROCODONE BITARTRATE AND ACETAMINOPHEN 1 TABLET: 5; 325 TABLET ORAL at 03:04

## 2019-09-17 RX ADMIN — AMLODIPINE BESYLATE 5 MG: 5 TABLET ORAL at 08:20

## 2019-09-17 RX ADMIN — SPIRONOLACTONE 12.5 MG: 25 TABLET ORAL at 08:21

## 2019-09-17 RX ADMIN — CHLORTHALIDONE 12.5 MG: 25 TABLET ORAL at 08:21

## 2019-09-17 RX ADMIN — BUDESONIDE 0.5 MG: 0.5 INHALANT RESPIRATORY (INHALATION) at 06:46

## 2019-09-17 RX ADMIN — RAMIPRIL 10 MG: 5 CAPSULE ORAL at 08:20

## 2019-09-18 ENCOUNTER — TRANSITIONAL CARE MANAGEMENT TELEPHONE ENCOUNTER (OUTPATIENT)
Dept: INTERNAL MEDICINE | Facility: CLINIC | Age: 69
End: 2019-09-18

## 2019-09-18 ENCOUNTER — READMISSION MANAGEMENT (OUTPATIENT)
Dept: CALL CENTER | Facility: HOSPITAL | Age: 69
End: 2019-09-18

## 2019-09-19 ENCOUNTER — READMISSION MANAGEMENT (OUTPATIENT)
Dept: CALL CENTER | Facility: HOSPITAL | Age: 69
End: 2019-09-19

## 2019-09-19 ENCOUNTER — OFFICE VISIT (OUTPATIENT)
Dept: INTERNAL MEDICINE | Facility: CLINIC | Age: 69
End: 2019-09-19

## 2019-09-19 VITALS
HEART RATE: 62 BPM | HEIGHT: 68 IN | RESPIRATION RATE: 19 BRPM | BODY MASS INDEX: 24.4 KG/M2 | DIASTOLIC BLOOD PRESSURE: 60 MMHG | TEMPERATURE: 97.6 F | SYSTOLIC BLOOD PRESSURE: 100 MMHG | OXYGEN SATURATION: 94 % | WEIGHT: 161 LBS

## 2019-09-19 DIAGNOSIS — R09.02 HYPOXIA: ICD-10-CM

## 2019-09-19 DIAGNOSIS — S42.212D CLOSED DISPLACED FRACTURE OF SURGICAL NECK OF LEFT HUMERUS WITH ROUTINE HEALING, UNSPECIFIED FRACTURE MORPHOLOGY, SUBSEQUENT ENCOUNTER: ICD-10-CM

## 2019-09-19 DIAGNOSIS — R55 SYNCOPE, NEAR: ICD-10-CM

## 2019-09-19 DIAGNOSIS — R06.02 SHORTNESS OF BREATH: ICD-10-CM

## 2019-09-19 DIAGNOSIS — I21.4 NSTEMI (NON-ST ELEVATED MYOCARDIAL INFARCTION) (HCC): Primary | ICD-10-CM

## 2019-09-19 DIAGNOSIS — S22.000D CLOSED COMPRESSION FRACTURE OF THORACIC VERTEBRA WITH ROUTINE HEALING, SUBSEQUENT ENCOUNTER: ICD-10-CM

## 2019-09-19 DIAGNOSIS — Z95.2 HISTORY OF MITRAL VALVE PROSTHESIS: ICD-10-CM

## 2019-09-19 DIAGNOSIS — I95.9 HYPOTENSION, UNSPECIFIED HYPOTENSION TYPE: ICD-10-CM

## 2019-09-19 PROCEDURE — 99496 TRANSJ CARE MGMT HIGH F2F 7D: CPT | Performed by: FAMILY MEDICINE

## 2019-09-19 RX ORDER — ALBUTEROL SULFATE 90 UG/1
1 AEROSOL, METERED RESPIRATORY (INHALATION) EVERY 4 HOURS PRN
Qty: 1 INHALER | Refills: 0 | Status: SHIPPED | OUTPATIENT
Start: 2019-09-19 | End: 2019-11-24 | Stop reason: SDUPTHER

## 2019-09-19 RX ORDER — PAROXETINE 10 MG/1
10 TABLET, FILM COATED ORAL DAILY
Refills: 5 | COMMUNITY
Start: 2019-08-22 | End: 2019-10-03 | Stop reason: SDUPTHER

## 2019-09-20 ENCOUNTER — OFFICE VISIT (OUTPATIENT)
Dept: CARDIOLOGY | Facility: CLINIC | Age: 69
End: 2019-09-20

## 2019-09-20 VITALS
WEIGHT: 158.6 LBS | OXYGEN SATURATION: 97 % | DIASTOLIC BLOOD PRESSURE: 60 MMHG | BODY MASS INDEX: 24.04 KG/M2 | SYSTOLIC BLOOD PRESSURE: 110 MMHG | HEART RATE: 60 BPM | HEIGHT: 68 IN

## 2019-09-20 DIAGNOSIS — Z98.890 HX OF MITRAL VALVE REPAIR: ICD-10-CM

## 2019-09-20 DIAGNOSIS — I21.4 NSTEMI (NON-ST ELEVATED MYOCARDIAL INFARCTION) (HCC): Primary | ICD-10-CM

## 2019-09-20 DIAGNOSIS — R77.8 ELEVATED TROPONIN: ICD-10-CM

## 2019-09-20 DIAGNOSIS — R55 SYNCOPE, NEAR: ICD-10-CM

## 2019-09-20 DIAGNOSIS — T82.09XD: ICD-10-CM

## 2019-09-20 DIAGNOSIS — Z95.2 HISTORY OF MITRAL VALVE PROSTHESIS: ICD-10-CM

## 2019-09-20 PROCEDURE — 99214 OFFICE O/P EST MOD 30 MIN: CPT | Performed by: NURSE PRACTITIONER

## 2019-09-20 RX ORDER — SENNOSIDES 8.6 MG
650 CAPSULE ORAL EVERY 8 HOURS PRN
COMMUNITY
End: 2020-02-25

## 2019-09-23 ENCOUNTER — HOSPITAL ENCOUNTER (OUTPATIENT)
Dept: PULMONOLOGY | Facility: HOSPITAL | Age: 69
Discharge: HOME | End: 2019-09-23
Admitting: FAMILY MEDICINE

## 2019-09-23 ENCOUNTER — TELEPHONE (OUTPATIENT)
Dept: CARDIOLOGY | Facility: CLINIC | Age: 69
End: 2019-09-23

## 2019-09-23 PROCEDURE — 94640 AIRWAY INHALATION TREATMENT: CPT

## 2019-09-23 RX ORDER — ALBUTEROL SULFATE 2.5 MG/3ML
2.5 SOLUTION RESPIRATORY (INHALATION) ONCE
Status: DISCONTINUED | OUTPATIENT
Start: 2019-09-23 | End: 2019-09-24 | Stop reason: HOSPADM

## 2019-09-25 ENCOUNTER — READMISSION MANAGEMENT (OUTPATIENT)
Dept: CALL CENTER | Facility: HOSPITAL | Age: 69
End: 2019-09-25

## 2019-09-25 ENCOUNTER — TELEPHONE (OUTPATIENT)
Dept: CARDIOLOGY | Facility: CLINIC | Age: 69
End: 2019-09-25

## 2019-09-25 DIAGNOSIS — T82.09XD: ICD-10-CM

## 2019-09-25 DIAGNOSIS — Z95.2 HISTORY OF MITRAL VALVE PROSTHESIS: Primary | ICD-10-CM

## 2019-09-25 DIAGNOSIS — Z98.890 HX OF MITRAL VALVE REPAIR: ICD-10-CM

## 2019-09-26 DIAGNOSIS — T82.09XD: Primary | ICD-10-CM

## 2019-09-26 DIAGNOSIS — Z98.890 HX OF MITRAL VALVE REPAIR: ICD-10-CM

## 2019-09-27 PROBLEM — T82.09XD: Status: ACTIVE | Noted: 2019-09-27

## 2019-10-01 ENCOUNTER — TELEPHONE (OUTPATIENT)
Dept: PHARMACY | Facility: HOSPITAL | Age: 69
End: 2019-10-01

## 2019-10-02 ENCOUNTER — ANTICOAGULATION VISIT (OUTPATIENT)
Dept: PHARMACY | Facility: HOSPITAL | Age: 69
End: 2019-10-02

## 2019-10-02 ENCOUNTER — LAB (OUTPATIENT)
Dept: LAB | Facility: HOSPITAL | Age: 69
End: 2019-10-02

## 2019-10-02 DIAGNOSIS — Z95.2 HX OF MITRAL VALVE REPLACEMENT WITH MECHANICAL VALVE: ICD-10-CM

## 2019-10-02 LAB
INR PPP: 4.59 (ref 0.85–1.16)
INR PPP: 7.1 (ref 0.91–1.09)
INR PPP: 7.2 (ref 0.91–1.09)
PROTHROMBIN TIME: 41.8 SECONDS (ref 11.2–14.3)
PROTHROMBIN TIME: 85.4 SECONDS (ref 10–13.8)
PROTHROMBIN TIME: 86.7 SECONDS (ref 10–13.8)

## 2019-10-02 PROCEDURE — G0463 HOSPITAL OUTPT CLINIC VISIT: HCPCS

## 2019-10-02 PROCEDURE — 85610 PROTHROMBIN TIME: CPT

## 2019-10-02 PROCEDURE — 36415 COLL VENOUS BLD VENIPUNCTURE: CPT

## 2019-10-02 PROCEDURE — 36416 COLLJ CAPILLARY BLOOD SPEC: CPT

## 2019-10-02 RX ORDER — CLOTRIMAZOLE 1 %
1 CREAM (GRAM) TOPICAL 2 TIMES DAILY PRN
COMMUNITY
End: 2019-10-03

## 2019-10-03 ENCOUNTER — TELEPHONE (OUTPATIENT)
Dept: PHARMACY | Facility: HOSPITAL | Age: 69
End: 2019-10-03

## 2019-10-03 ENCOUNTER — OFFICE VISIT (OUTPATIENT)
Dept: INTERNAL MEDICINE | Facility: CLINIC | Age: 69
End: 2019-10-03

## 2019-10-03 VITALS
TEMPERATURE: 97.3 F | RESPIRATION RATE: 16 BRPM | BODY MASS INDEX: 23.64 KG/M2 | WEIGHT: 156 LBS | HEIGHT: 68 IN | SYSTOLIC BLOOD PRESSURE: 102 MMHG | DIASTOLIC BLOOD PRESSURE: 62 MMHG | OXYGEN SATURATION: 94 % | HEART RATE: 66 BPM

## 2019-10-03 DIAGNOSIS — S22.000A THORACIC COMPRESSION FRACTURE, CLOSED, INITIAL ENCOUNTER (HCC): ICD-10-CM

## 2019-10-03 DIAGNOSIS — R09.02 HYPOXIA: ICD-10-CM

## 2019-10-03 DIAGNOSIS — R30.0 DYSURIA: ICD-10-CM

## 2019-10-03 DIAGNOSIS — S42.212D CLOSED DISPLACED FRACTURE OF SURGICAL NECK OF LEFT HUMERUS WITH ROUTINE HEALING, UNSPECIFIED FRACTURE MORPHOLOGY, SUBSEQUENT ENCOUNTER: Primary | ICD-10-CM

## 2019-10-03 DIAGNOSIS — G57.93 NEUROPATHIC PAIN OF BOTH FEET: ICD-10-CM

## 2019-10-03 DIAGNOSIS — F41.9 ANXIETY: ICD-10-CM

## 2019-10-03 DIAGNOSIS — E55.9 VITAMIN D DEFICIENCY: ICD-10-CM

## 2019-10-03 PROCEDURE — 99214 OFFICE O/P EST MOD 30 MIN: CPT | Performed by: FAMILY MEDICINE

## 2019-10-03 RX ORDER — BISOPROLOL FUMARATE 10 MG/1
10 TABLET, FILM COATED ORAL DAILY
COMMUNITY
End: 2019-10-15 | Stop reason: SDUPTHER

## 2019-10-03 RX ORDER — PAROXETINE HYDROCHLORIDE 20 MG/1
20 TABLET, FILM COATED ORAL DAILY
Qty: 30 TABLET | Refills: 5 | Status: SHIPPED | OUTPATIENT
Start: 2019-10-03 | End: 2020-02-06 | Stop reason: SDUPTHER

## 2019-10-04 ENCOUNTER — READMISSION MANAGEMENT (OUTPATIENT)
Dept: CALL CENTER | Facility: HOSPITAL | Age: 69
End: 2019-10-04

## 2019-10-04 ENCOUNTER — TRANSCRIBE ORDERS (OUTPATIENT)
Dept: GENERAL RADIOLOGY | Facility: HOSPITAL | Age: 69
End: 2019-10-04

## 2019-10-04 ENCOUNTER — HOSPITAL ENCOUNTER (OUTPATIENT)
Dept: GENERAL RADIOLOGY | Facility: HOSPITAL | Age: 69
Discharge: HOME | End: 2019-10-04
Admitting: ORTHOPAEDIC SURGERY

## 2019-10-04 DIAGNOSIS — M54.6 THORACIC SPINE PAIN: Primary | ICD-10-CM

## 2019-10-04 DIAGNOSIS — M25.512 LEFT SHOULDER PAIN, UNSPECIFIED CHRONICITY: ICD-10-CM

## 2019-10-04 LAB
25(OH)D3+25(OH)D2 SERPL-MCNC: 14.6 NG/ML (ref 30–100)
ALBUMIN SERPL-MCNC: 4.7 G/DL (ref 3.5–5.2)
ALBUMIN/GLOB SERPL: 1.9 G/DL
ALP SERPL-CCNC: 188 U/L (ref 39–117)
ALT SERPL-CCNC: 18 U/L (ref 1–33)
AST SERPL-CCNC: 22 U/L (ref 1–32)
BASOPHILS # BLD AUTO: 0.1 10*3/MM3 (ref 0–0.2)
BASOPHILS NFR BLD AUTO: 1 % (ref 0–1.5)
BILIRUB SERPL-MCNC: 1.1 MG/DL (ref 0.2–1.2)
BUN SERPL-MCNC: 24 MG/DL (ref 8–23)
BUN/CREAT SERPL: 24.5 (ref 7–25)
CALCIUM SERPL-MCNC: 9.2 MG/DL (ref 8.6–10.5)
CHLORIDE SERPL-SCNC: 99 MMOL/L (ref 98–107)
CO2 SERPL-SCNC: 29.5 MMOL/L (ref 22–29)
CREAT SERPL-MCNC: 0.98 MG/DL (ref 0.57–1)
EOSINOPHIL # BLD AUTO: 0.25 10*3/MM3 (ref 0–0.4)
EOSINOPHIL NFR BLD AUTO: 2.5 % (ref 0.3–6.2)
ERYTHROCYTE [DISTWIDTH] IN BLOOD BY AUTOMATED COUNT: 13.5 % (ref 12.3–15.4)
GLOBULIN SER CALC-MCNC: 2.5 GM/DL
GLUCOSE SERPL-MCNC: 114 MG/DL (ref 65–99)
HCT VFR BLD AUTO: 39.6 % (ref 34–46.6)
HGB BLD-MCNC: 12.9 G/DL (ref 12–15.9)
IMM GRANULOCYTES # BLD AUTO: 0.06 10*3/MM3 (ref 0–0.05)
IMM GRANULOCYTES NFR BLD AUTO: 0.6 % (ref 0–0.5)
LYMPHOCYTES # BLD AUTO: 1.3 10*3/MM3 (ref 0.7–3.1)
LYMPHOCYTES NFR BLD AUTO: 12.8 % (ref 19.6–45.3)
MCH RBC QN AUTO: 28 PG (ref 26.6–33)
MCHC RBC AUTO-ENTMCNC: 32.6 G/DL (ref 31.5–35.7)
MCV RBC AUTO: 86.1 FL (ref 79–97)
MONOCYTES # BLD AUTO: 0.92 10*3/MM3 (ref 0.1–0.9)
MONOCYTES NFR BLD AUTO: 9.1 % (ref 5–12)
NEUTROPHILS # BLD AUTO: 7.53 10*3/MM3 (ref 1.7–7)
NEUTROPHILS NFR BLD AUTO: 74 % (ref 42.7–76)
NRBC BLD AUTO-RTO: 0 /100 WBC (ref 0–0.2)
PLATELET # BLD AUTO: 355 10*3/MM3 (ref 140–450)
POTASSIUM SERPL-SCNC: 4.8 MMOL/L (ref 3.5–5.2)
PROT SERPL-MCNC: 7.2 G/DL (ref 6–8.5)
RBC # BLD AUTO: 4.6 10*6/MM3 (ref 3.77–5.28)
SODIUM SERPL-SCNC: 142 MMOL/L (ref 136–145)
UNABLE TO VOID: NORMAL
VIT B12 SERPL-MCNC: 552 PG/ML (ref 211–946)
WBC # BLD AUTO: 10.16 10*3/MM3 (ref 3.4–10.8)

## 2019-10-04 PROCEDURE — 73030 X-RAY EXAM OF SHOULDER: CPT

## 2019-10-04 PROCEDURE — 72070 X-RAY EXAM THORAC SPINE 2VWS: CPT

## 2019-10-07 ENCOUNTER — LAB (OUTPATIENT)
Dept: LAB | Facility: HOSPITAL | Age: 69
End: 2019-10-07

## 2019-10-07 ENCOUNTER — READMISSION MANAGEMENT (OUTPATIENT)
Dept: CALL CENTER | Facility: HOSPITAL | Age: 69
End: 2019-10-07

## 2019-10-07 DIAGNOSIS — Z95.2 HX OF MITRAL VALVE REPLACEMENT WITH MECHANICAL VALVE: ICD-10-CM

## 2019-10-07 LAB
INR PPP: 3.31 (ref 0.9–1.1)
PROTHROMBIN TIME: 34.4 SECONDS (ref 12–15.1)

## 2019-10-07 PROCEDURE — 85610 PROTHROMBIN TIME: CPT

## 2019-10-07 PROCEDURE — 36415 COLL VENOUS BLD VENIPUNCTURE: CPT

## 2019-10-09 ENCOUNTER — ANTICOAGULATION VISIT (OUTPATIENT)
Dept: PHARMACY | Facility: HOSPITAL | Age: 69
End: 2019-10-09

## 2019-10-09 DIAGNOSIS — Z95.2 HX OF MITRAL VALVE REPLACEMENT WITH MECHANICAL VALVE: ICD-10-CM

## 2019-10-09 PROBLEM — S22.000A THORACIC COMPRESSION FRACTURE, CLOSED, INITIAL ENCOUNTER: Status: ACTIVE | Noted: 2019-10-09

## 2019-10-09 PROBLEM — R09.02 HYPOXIA: Status: ACTIVE | Noted: 2019-10-09

## 2019-10-14 ENCOUNTER — READMISSION MANAGEMENT (OUTPATIENT)
Dept: CALL CENTER | Facility: HOSPITAL | Age: 69
End: 2019-10-14

## 2019-10-15 ENCOUNTER — TELEPHONE (OUTPATIENT)
Dept: PHARMACY | Facility: HOSPITAL | Age: 69
End: 2019-10-15

## 2019-10-15 ENCOUNTER — LAB (OUTPATIENT)
Dept: LAB | Facility: HOSPITAL | Age: 69
End: 2019-10-15

## 2019-10-15 DIAGNOSIS — Z95.2 HX OF MITRAL VALVE REPLACEMENT WITH MECHANICAL VALVE: ICD-10-CM

## 2019-10-15 LAB
INR PPP: 3.18 (ref 0.9–1.1)
PROTHROMBIN TIME: 33.4 SECONDS (ref 12–15.1)

## 2019-10-15 PROCEDURE — 36415 COLL VENOUS BLD VENIPUNCTURE: CPT

## 2019-10-15 PROCEDURE — 85610 PROTHROMBIN TIME: CPT

## 2019-10-15 RX ORDER — PRAVASTATIN SODIUM 80 MG/1
80 TABLET ORAL NIGHTLY
Qty: 30 TABLET | Refills: 2 | Status: SHIPPED | OUTPATIENT
Start: 2019-10-15 | End: 2020-02-03

## 2019-10-15 RX ORDER — RAMIPRIL 10 MG/1
10 CAPSULE ORAL DAILY
Qty: 30 CAPSULE | Refills: 2 | Status: SHIPPED | OUTPATIENT
Start: 2019-10-15 | End: 2019-10-21 | Stop reason: ALTCHOICE

## 2019-10-15 RX ORDER — CHLORTHALIDONE 25 MG/1
12.5 TABLET ORAL DAILY
Qty: 30 TABLET | Refills: 2 | Status: SHIPPED | OUTPATIENT
Start: 2019-10-15 | End: 2019-10-21 | Stop reason: ALTCHOICE

## 2019-10-15 RX ORDER — BISOPROLOL FUMARATE 10 MG/1
10 TABLET, FILM COATED ORAL DAILY
Qty: 30 TABLET | Refills: 2 | Status: SHIPPED | OUTPATIENT
Start: 2019-10-15 | End: 2019-10-21 | Stop reason: ALTCHOICE

## 2019-10-15 RX ORDER — SPIRONOLACTONE 25 MG/1
12.5 TABLET ORAL DAILY
Qty: 30 TABLET | Refills: 2 | Status: SHIPPED | OUTPATIENT
Start: 2019-10-15 | End: 2019-10-21

## 2019-10-16 ENCOUNTER — ANTICOAGULATION VISIT (OUTPATIENT)
Dept: PHARMACY | Facility: HOSPITAL | Age: 69
End: 2019-10-16

## 2019-10-16 DIAGNOSIS — Z95.2 HX OF MITRAL VALVE REPLACEMENT WITH MECHANICAL VALVE: ICD-10-CM

## 2019-10-16 RX ORDER — ERGOCALCIFEROL 1.25 MG/1
50000 CAPSULE ORAL WEEKLY
Qty: 5 CAPSULE | Refills: 5 | Status: SHIPPED | OUTPATIENT
Start: 2019-10-16 | End: 2021-04-09

## 2019-10-21 ENCOUNTER — OFFICE VISIT (OUTPATIENT)
Dept: CARDIOLOGY | Facility: CLINIC | Age: 69
End: 2019-10-21

## 2019-10-21 ENCOUNTER — LAB (OUTPATIENT)
Dept: LAB | Facility: HOSPITAL | Age: 69
End: 2019-10-21

## 2019-10-21 VITALS
HEIGHT: 68 IN | DIASTOLIC BLOOD PRESSURE: 60 MMHG | BODY MASS INDEX: 23.49 KG/M2 | WEIGHT: 155 LBS | HEART RATE: 61 BPM | OXYGEN SATURATION: 94 % | SYSTOLIC BLOOD PRESSURE: 92 MMHG

## 2019-10-21 DIAGNOSIS — I10 ESSENTIAL HYPERTENSION: ICD-10-CM

## 2019-10-21 DIAGNOSIS — Z95.2 HX OF MITRAL VALVE REPLACEMENT WITH MECHANICAL VALVE: ICD-10-CM

## 2019-10-21 DIAGNOSIS — I21.4 NSTEMI (NON-ST ELEVATED MYOCARDIAL INFARCTION) (HCC): Primary | ICD-10-CM

## 2019-10-21 DIAGNOSIS — Z98.890 HX OF MITRAL VALVE REPAIR: ICD-10-CM

## 2019-10-21 DIAGNOSIS — R55 SYNCOPE, NEAR: ICD-10-CM

## 2019-10-21 LAB
INR PPP: 3.1 (ref 0.9–1.1)
PROTHROMBIN TIME: 32.7 SECONDS (ref 12–15.1)

## 2019-10-21 PROCEDURE — 85610 PROTHROMBIN TIME: CPT

## 2019-10-21 PROCEDURE — 36415 COLL VENOUS BLD VENIPUNCTURE: CPT

## 2019-10-21 PROCEDURE — 99214 OFFICE O/P EST MOD 30 MIN: CPT | Performed by: NURSE PRACTITIONER

## 2019-10-21 RX ORDER — RAMIPRIL 5 MG/1
5 CAPSULE ORAL DAILY
Qty: 30 CAPSULE | Refills: 0 | Status: SHIPPED | OUTPATIENT
Start: 2019-10-21 | End: 2019-11-20

## 2019-10-21 RX ORDER — BISOPROLOL FUMARATE 5 MG/1
5 TABLET, FILM COATED ORAL DAILY
Qty: 30 TABLET | Refills: 0 | Status: SHIPPED | OUTPATIENT
Start: 2019-10-21 | End: 2020-02-19 | Stop reason: SDUPTHER

## 2019-10-21 RX ORDER — CHLORTHALIDONE 25 MG/1
12.5 TABLET ORAL DAILY
Qty: 15 TABLET | Refills: 5 | Status: SHIPPED | OUTPATIENT
Start: 2019-10-21 | End: 2020-02-25 | Stop reason: ALTCHOICE

## 2019-10-22 ENCOUNTER — ANTICOAGULATION VISIT (OUTPATIENT)
Dept: PHARMACY | Facility: HOSPITAL | Age: 69
End: 2019-10-22

## 2019-10-22 DIAGNOSIS — Z95.2 HX OF MITRAL VALVE REPLACEMENT WITH MECHANICAL VALVE: ICD-10-CM

## 2019-10-29 ENCOUNTER — TELEPHONE (OUTPATIENT)
Dept: PHARMACY | Facility: HOSPITAL | Age: 69
End: 2019-10-29

## 2019-10-30 ENCOUNTER — ANTICOAGULATION VISIT (OUTPATIENT)
Dept: PHARMACY | Facility: HOSPITAL | Age: 69
End: 2019-10-30

## 2019-10-30 DIAGNOSIS — Z95.2 HX OF MITRAL VALVE REPLACEMENT WITH MECHANICAL VALVE: ICD-10-CM

## 2019-10-30 LAB — INR PPP: 4.4

## 2019-11-06 ENCOUNTER — ANTICOAGULATION VISIT (OUTPATIENT)
Dept: PHARMACY | Facility: HOSPITAL | Age: 69
End: 2019-11-06

## 2019-11-06 DIAGNOSIS — Z95.2 HX OF MITRAL VALVE REPLACEMENT WITH MECHANICAL VALVE: ICD-10-CM

## 2019-11-06 LAB — INR PPP: 3.7

## 2019-11-11 ENCOUNTER — OFFICE VISIT (OUTPATIENT)
Dept: PULMONOLOGY | Facility: CLINIC | Age: 69
End: 2019-11-11

## 2019-11-11 VITALS
HEIGHT: 68 IN | WEIGHT: 155 LBS | RESPIRATION RATE: 18 BRPM | BODY MASS INDEX: 23.49 KG/M2 | DIASTOLIC BLOOD PRESSURE: 64 MMHG | HEART RATE: 84 BPM | SYSTOLIC BLOOD PRESSURE: 112 MMHG | OXYGEN SATURATION: 91 %

## 2019-11-11 DIAGNOSIS — G47.33 OSA (OBSTRUCTIVE SLEEP APNEA): ICD-10-CM

## 2019-11-11 DIAGNOSIS — J44.9 CHRONIC OBSTRUCTIVE PULMONARY DISEASE, UNSPECIFIED COPD TYPE (HCC): ICD-10-CM

## 2019-11-11 DIAGNOSIS — R06.02 SHORTNESS OF BREATH: ICD-10-CM

## 2019-11-11 DIAGNOSIS — R09.02 EXERCISE HYPOXEMIA: ICD-10-CM

## 2019-11-11 DIAGNOSIS — R06.02 SHORTNESS OF BREATH: Primary | ICD-10-CM

## 2019-11-11 DIAGNOSIS — G47.34 NOCTURNAL HYPOXIA: ICD-10-CM

## 2019-11-11 PROCEDURE — 94060 EVALUATION OF WHEEZING: CPT | Performed by: INTERNAL MEDICINE

## 2019-11-11 PROCEDURE — 99214 OFFICE O/P EST MOD 30 MIN: CPT | Performed by: NURSE PRACTITIONER

## 2019-11-11 PROCEDURE — 94618 PULMONARY STRESS TESTING: CPT | Performed by: NURSE PRACTITIONER

## 2019-11-11 PROCEDURE — 94726 PLETHYSMOGRAPHY LUNG VOLUMES: CPT | Performed by: INTERNAL MEDICINE

## 2019-11-11 PROCEDURE — 94729 DIFFUSING CAPACITY: CPT | Performed by: INTERNAL MEDICINE

## 2019-11-13 ENCOUNTER — ANTICOAGULATION VISIT (OUTPATIENT)
Dept: PHARMACY | Facility: HOSPITAL | Age: 69
End: 2019-11-13

## 2019-11-13 DIAGNOSIS — Z95.2 HX OF MITRAL VALVE REPLACEMENT WITH MECHANICAL VALVE: ICD-10-CM

## 2019-11-13 LAB — INR PPP: 3.3

## 2019-11-20 ENCOUNTER — TELEPHONE (OUTPATIENT)
Dept: PHARMACY | Facility: HOSPITAL | Age: 69
End: 2019-11-20

## 2019-11-22 ENCOUNTER — TELEPHONE (OUTPATIENT)
Dept: PHARMACY | Facility: HOSPITAL | Age: 69
End: 2019-11-22

## 2019-11-22 ENCOUNTER — TELEPHONE (OUTPATIENT)
Dept: INTERNAL MEDICINE | Facility: CLINIC | Age: 69
End: 2019-11-22

## 2019-11-22 ENCOUNTER — HOSPITAL ENCOUNTER (EMERGENCY)
Facility: HOSPITAL | Age: 69
Discharge: HOME | End: 2019-11-22
Attending: EMERGENCY MEDICINE | Admitting: EMERGENCY MEDICINE

## 2019-11-22 ENCOUNTER — APPOINTMENT (OUTPATIENT)
Dept: CT IMAGING | Facility: HOSPITAL | Age: 69
End: 2019-11-22

## 2019-11-22 VITALS
HEIGHT: 67 IN | WEIGHT: 156 LBS | BODY MASS INDEX: 24.48 KG/M2 | HEART RATE: 95 BPM | OXYGEN SATURATION: 95 % | RESPIRATION RATE: 14 BRPM | DIASTOLIC BLOOD PRESSURE: 83 MMHG | TEMPERATURE: 98.2 F | SYSTOLIC BLOOD PRESSURE: 151 MMHG

## 2019-11-22 DIAGNOSIS — E86.0 DEHYDRATION, MILD: ICD-10-CM

## 2019-11-22 DIAGNOSIS — Z79.01 CHRONIC ANTICOAGULATION: ICD-10-CM

## 2019-11-22 DIAGNOSIS — E87.6 HYPOKALEMIA: ICD-10-CM

## 2019-11-22 DIAGNOSIS — N30.91 HEMORRHAGIC CYSTITIS: Primary | ICD-10-CM

## 2019-11-22 LAB
ALBUMIN SERPL-MCNC: 4.4 G/DL (ref 3.5–5.2)
ALBUMIN/GLOB SERPL: 1.4 G/DL
ALP SERPL-CCNC: 102 U/L (ref 39–117)
ALT SERPL W P-5'-P-CCNC: 10 U/L (ref 1–33)
ANION GAP SERPL CALCULATED.3IONS-SCNC: 12 MMOL/L (ref 5–15)
AST SERPL-CCNC: 25 U/L (ref 1–32)
BACTERIA UR QL AUTO: ABNORMAL /HPF
BASOPHILS # BLD AUTO: 0.06 10*3/MM3 (ref 0–0.2)
BASOPHILS NFR BLD AUTO: 0.7 % (ref 0–1.5)
BILIRUB SERPL-MCNC: 1.1 MG/DL (ref 0.2–1.2)
BILIRUB UR QL STRIP: ABNORMAL
BUN BLD-MCNC: 12 MG/DL (ref 8–23)
BUN/CREAT SERPL: 14.1 (ref 7–25)
CALCIUM SPEC-SCNC: 9.3 MG/DL (ref 8.6–10.5)
CHLORIDE SERPL-SCNC: 95 MMOL/L (ref 98–107)
CLARITY UR: ABNORMAL
CO2 SERPL-SCNC: 31 MMOL/L (ref 22–29)
COLOR UR: ABNORMAL
CREAT BLD-MCNC: 0.85 MG/DL (ref 0.57–1)
DEPRECATED RDW RBC AUTO: 43.6 FL (ref 37–54)
EOSINOPHIL # BLD AUTO: 0.28 10*3/MM3 (ref 0–0.4)
EOSINOPHIL NFR BLD AUTO: 3.1 % (ref 0.3–6.2)
ERYTHROCYTE [DISTWIDTH] IN BLOOD BY AUTOMATED COUNT: 13.2 % (ref 12.3–15.4)
GFR SERPL CREATININE-BSD FRML MDRD: 66 ML/MIN/1.73
GLOBULIN UR ELPH-MCNC: 3.1 GM/DL
GLUCOSE BLD-MCNC: 133 MG/DL (ref 65–99)
GLUCOSE UR STRIP-MCNC: NEGATIVE MG/DL
HCT VFR BLD AUTO: 41.6 % (ref 34–46.6)
HGB BLD-MCNC: 13 G/DL (ref 12–15.9)
HGB UR QL STRIP.AUTO: ABNORMAL
HYALINE CASTS UR QL AUTO: ABNORMAL /LPF
IMM GRANULOCYTES # BLD AUTO: 0.04 10*3/MM3 (ref 0–0.05)
IMM GRANULOCYTES NFR BLD AUTO: 0.4 % (ref 0–0.5)
INR PPP: 2.69 (ref 0.9–1.1)
KETONES UR QL STRIP: ABNORMAL
LEUKOCYTE ESTERASE UR QL STRIP.AUTO: ABNORMAL
LYMPHOCYTES # BLD AUTO: 1.31 10*3/MM3 (ref 0.7–3.1)
LYMPHOCYTES NFR BLD AUTO: 14.3 % (ref 19.6–45.3)
MCH RBC QN AUTO: 28.3 PG (ref 26.6–33)
MCHC RBC AUTO-ENTMCNC: 31.3 G/DL (ref 31.5–35.7)
MCV RBC AUTO: 90.6 FL (ref 79–97)
MONOCYTES # BLD AUTO: 0.8 10*3/MM3 (ref 0.1–0.9)
MONOCYTES NFR BLD AUTO: 8.8 % (ref 5–12)
NEUTROPHILS # BLD AUTO: 6.64 10*3/MM3 (ref 1.7–7)
NEUTROPHILS NFR BLD AUTO: 72.7 % (ref 42.7–76)
NITRITE UR QL STRIP: POSITIVE
NRBC BLD AUTO-RTO: 0 /100 WBC (ref 0–0.2)
PH UR STRIP.AUTO: 5 [PH] (ref 5–8)
PLATELET # BLD AUTO: 217 10*3/MM3 (ref 140–450)
PMV BLD AUTO: 10.3 FL (ref 6–12)
POTASSIUM BLD-SCNC: 3.2 MMOL/L (ref 3.5–5.2)
PROT SERPL-MCNC: 7.5 G/DL (ref 6–8.5)
PROT UR QL STRIP: ABNORMAL
PROTHROMBIN TIME: 29.2 SECONDS (ref 12–15.1)
RBC # BLD AUTO: 4.59 10*6/MM3 (ref 3.77–5.28)
RBC # UR: ABNORMAL /HPF
REF LAB TEST METHOD: ABNORMAL
SODIUM BLD-SCNC: 138 MMOL/L (ref 136–145)
SP GR UR STRIP: 1.03 (ref 1–1.03)
SQUAMOUS #/AREA URNS HPF: ABNORMAL /HPF
UROBILINOGEN UR QL STRIP: ABNORMAL
WBC NRBC COR # BLD: 9.13 10*3/MM3 (ref 3.4–10.8)
WBC UR QL AUTO: ABNORMAL /HPF

## 2019-11-22 PROCEDURE — 85610 PROTHROMBIN TIME: CPT | Performed by: PHYSICIAN ASSISTANT

## 2019-11-22 PROCEDURE — 85025 COMPLETE CBC W/AUTO DIFF WBC: CPT | Performed by: PHYSICIAN ASSISTANT

## 2019-11-22 PROCEDURE — 25010000002 CEFTRIAXONE SODIUM-DEXTROSE 1-3.74 GM-%(50ML) RECONSTITUTED SOLUTION: Performed by: PHYSICIAN ASSISTANT

## 2019-11-22 PROCEDURE — 80053 COMPREHEN METABOLIC PANEL: CPT | Performed by: PHYSICIAN ASSISTANT

## 2019-11-22 PROCEDURE — 99283 EMERGENCY DEPT VISIT LOW MDM: CPT

## 2019-11-22 PROCEDURE — 87086 URINE CULTURE/COLONY COUNT: CPT | Performed by: PHYSICIAN ASSISTANT

## 2019-11-22 PROCEDURE — 96365 THER/PROPH/DIAG IV INF INIT: CPT

## 2019-11-22 PROCEDURE — 81001 URINALYSIS AUTO W/SCOPE: CPT | Performed by: PHYSICIAN ASSISTANT

## 2019-11-22 PROCEDURE — 74176 CT ABD & PELVIS W/O CONTRAST: CPT

## 2019-11-22 RX ORDER — POTASSIUM CHLORIDE 750 MG/1
20 CAPSULE, EXTENDED RELEASE ORAL ONCE
Status: COMPLETED | OUTPATIENT
Start: 2019-11-22 | End: 2019-11-22

## 2019-11-22 RX ORDER — CEPHALEXIN 500 MG/1
500 CAPSULE ORAL 2 TIMES DAILY
Qty: 14 CAPSULE | Refills: 0 | Status: SHIPPED | OUTPATIENT
Start: 2019-11-22 | End: 2019-11-29

## 2019-11-22 RX ORDER — CEFTRIAXONE 1 G/50ML
1 INJECTION, SOLUTION INTRAVENOUS ONCE
Status: COMPLETED | OUTPATIENT
Start: 2019-11-22 | End: 2019-11-22

## 2019-11-22 RX ADMIN — CEFTRIAXONE 1 G: 1 INJECTION, SOLUTION INTRAVENOUS at 12:43

## 2019-11-22 RX ADMIN — SODIUM CHLORIDE 500 ML: 9 INJECTION, SOLUTION INTRAVENOUS at 11:48

## 2019-11-22 RX ADMIN — POTASSIUM CHLORIDE 20 MEQ: 750 CAPSULE, EXTENDED RELEASE ORAL at 12:43

## 2019-11-23 LAB — BACTERIA SPEC AEROBE CULT: NORMAL

## 2019-11-25 RX ORDER — ALBUTEROL SULFATE 90 UG/1
AEROSOL, METERED RESPIRATORY (INHALATION)
Qty: 18 G | Refills: 0 | Status: SHIPPED | OUTPATIENT
Start: 2019-11-25 | End: 2021-04-09 | Stop reason: SDUPTHER

## 2019-11-27 ENCOUNTER — ANTICOAGULATION VISIT (OUTPATIENT)
Dept: PHARMACY | Facility: HOSPITAL | Age: 69
End: 2019-11-27

## 2019-11-27 DIAGNOSIS — Z95.2 HX OF MITRAL VALVE REPLACEMENT WITH MECHANICAL VALVE: ICD-10-CM

## 2019-11-27 LAB — INR PPP: 4.1

## 2019-12-03 ENCOUNTER — ANTICOAGULATION VISIT (OUTPATIENT)
Dept: PHARMACY | Facility: HOSPITAL | Age: 69
End: 2019-12-03

## 2019-12-03 DIAGNOSIS — Z95.2 HX OF MITRAL VALVE REPLACEMENT WITH MECHANICAL VALVE: ICD-10-CM

## 2019-12-03 LAB — INR PPP: 2.2

## 2019-12-06 ENCOUNTER — ANTICOAGULATION VISIT (OUTPATIENT)
Dept: PHARMACY | Facility: HOSPITAL | Age: 69
End: 2019-12-06

## 2019-12-06 DIAGNOSIS — Z95.2 HX OF MITRAL VALVE REPLACEMENT WITH MECHANICAL VALVE: ICD-10-CM

## 2019-12-06 LAB — INR PPP: 2.9

## 2019-12-13 ENCOUNTER — ANTICOAGULATION VISIT (OUTPATIENT)
Dept: PHARMACY | Facility: HOSPITAL | Age: 69
End: 2019-12-13

## 2019-12-13 DIAGNOSIS — Z95.2 HX OF MITRAL VALVE REPLACEMENT WITH MECHANICAL VALVE: ICD-10-CM

## 2019-12-13 LAB — INR PPP: 2.1

## 2019-12-17 ENCOUNTER — ANTICOAGULATION VISIT (OUTPATIENT)
Dept: PHARMACY | Facility: HOSPITAL | Age: 69
End: 2019-12-17

## 2019-12-17 DIAGNOSIS — Z95.2 HX OF MITRAL VALVE REPLACEMENT WITH MECHANICAL VALVE: ICD-10-CM

## 2019-12-17 LAB — INR PPP: 3

## 2019-12-26 ENCOUNTER — ANTICOAGULATION VISIT (OUTPATIENT)
Dept: PHARMACY | Facility: HOSPITAL | Age: 69
End: 2019-12-26

## 2019-12-26 DIAGNOSIS — Z95.2 HX OF MITRAL VALVE REPLACEMENT WITH MECHANICAL VALVE: ICD-10-CM

## 2019-12-26 LAB — INR PPP: 3.1

## 2020-01-02 ENCOUNTER — ANTICOAGULATION VISIT (OUTPATIENT)
Dept: PHARMACY | Facility: HOSPITAL | Age: 70
End: 2020-01-02

## 2020-01-02 DIAGNOSIS — Z95.2 HX OF MITRAL VALVE REPLACEMENT WITH MECHANICAL VALVE: ICD-10-CM

## 2020-01-02 LAB — INR PPP: 4

## 2020-01-08 ENCOUNTER — ANTICOAGULATION VISIT (OUTPATIENT)
Dept: PHARMACY | Facility: HOSPITAL | Age: 70
End: 2020-01-08

## 2020-01-08 DIAGNOSIS — Z95.2 HX OF MITRAL VALVE REPLACEMENT WITH MECHANICAL VALVE: ICD-10-CM

## 2020-01-08 LAB — INR PPP: 3.4

## 2020-01-15 ENCOUNTER — ANTICOAGULATION VISIT (OUTPATIENT)
Dept: PHARMACY | Facility: HOSPITAL | Age: 70
End: 2020-01-15

## 2020-01-15 DIAGNOSIS — Z95.2 HX OF MITRAL VALVE REPLACEMENT WITH MECHANICAL VALVE: ICD-10-CM

## 2020-01-15 LAB — INR PPP: 2.7

## 2020-01-21 ENCOUNTER — ANTICOAGULATION VISIT (OUTPATIENT)
Dept: PHARMACY | Facility: HOSPITAL | Age: 70
End: 2020-01-21

## 2020-01-21 DIAGNOSIS — Z95.2 HX OF MITRAL VALVE REPLACEMENT WITH MECHANICAL VALVE: ICD-10-CM

## 2020-01-21 LAB — INR PPP: 2.7 (ref 3–4)

## 2020-01-23 ENCOUNTER — OFFICE VISIT (OUTPATIENT)
Dept: INTERNAL MEDICINE | Facility: CLINIC | Age: 70
End: 2020-01-23

## 2020-01-23 VITALS
BODY MASS INDEX: 24.77 KG/M2 | HEIGHT: 67 IN | OXYGEN SATURATION: 91 % | DIASTOLIC BLOOD PRESSURE: 76 MMHG | SYSTOLIC BLOOD PRESSURE: 140 MMHG | HEART RATE: 81 BPM | TEMPERATURE: 97.9 F | WEIGHT: 157.8 LBS

## 2020-01-23 DIAGNOSIS — Z95.2 HX OF MITRAL VALVE REPLACEMENT WITH MECHANICAL VALVE: ICD-10-CM

## 2020-01-23 DIAGNOSIS — I10 ESSENTIAL HYPERTENSION: ICD-10-CM

## 2020-01-23 DIAGNOSIS — F41.9 ANXIETY: Primary | ICD-10-CM

## 2020-01-23 PROCEDURE — 99214 OFFICE O/P EST MOD 30 MIN: CPT | Performed by: FAMILY MEDICINE

## 2020-01-30 ENCOUNTER — ANTICOAGULATION VISIT (OUTPATIENT)
Dept: PHARMACY | Facility: HOSPITAL | Age: 70
End: 2020-01-30

## 2020-01-30 DIAGNOSIS — Z95.2 HX OF MITRAL VALVE REPLACEMENT WITH MECHANICAL VALVE: ICD-10-CM

## 2020-01-30 LAB — INR PPP: 3.4

## 2020-02-03 RX ORDER — PRAVASTATIN SODIUM 80 MG/1
TABLET ORAL
Qty: 90 TABLET | Refills: 0 | Status: SHIPPED | OUTPATIENT
Start: 2020-02-03 | End: 2020-02-06 | Stop reason: SDUPTHER

## 2020-02-05 ENCOUNTER — ANTICOAGULATION VISIT (OUTPATIENT)
Dept: PHARMACY | Facility: HOSPITAL | Age: 70
End: 2020-02-05

## 2020-02-05 ENCOUNTER — TELEPHONE (OUTPATIENT)
Dept: INTERNAL MEDICINE | Facility: CLINIC | Age: 70
End: 2020-02-05

## 2020-02-05 DIAGNOSIS — Z95.2 HX OF MITRAL VALVE REPLACEMENT WITH MECHANICAL VALVE: ICD-10-CM

## 2020-02-05 LAB — INR PPP: 3.5

## 2020-02-06 RX ORDER — PAROXETINE HYDROCHLORIDE 20 MG/1
20 TABLET, FILM COATED ORAL DAILY
Qty: 30 TABLET | Refills: 5 | Status: SHIPPED | OUTPATIENT
Start: 2020-02-06 | End: 2020-11-02

## 2020-02-06 RX ORDER — PRAVASTATIN SODIUM 80 MG/1
80 TABLET ORAL NIGHTLY
Qty: 90 TABLET | Refills: 0 | Status: SHIPPED | OUTPATIENT
Start: 2020-02-06 | End: 2020-08-12

## 2020-02-19 ENCOUNTER — ANTICOAGULATION VISIT (OUTPATIENT)
Dept: PHARMACY | Facility: HOSPITAL | Age: 70
End: 2020-02-19

## 2020-02-19 ENCOUNTER — OFFICE VISIT (OUTPATIENT)
Dept: CARDIOLOGY | Facility: CLINIC | Age: 70
End: 2020-02-19

## 2020-02-19 VITALS
BODY MASS INDEX: 24.92 KG/M2 | DIASTOLIC BLOOD PRESSURE: 70 MMHG | HEIGHT: 67 IN | SYSTOLIC BLOOD PRESSURE: 120 MMHG | TEMPERATURE: 98.9 F | OXYGEN SATURATION: 96 % | WEIGHT: 158.8 LBS | HEART RATE: 91 BPM

## 2020-02-19 DIAGNOSIS — Z95.2 HX OF MITRAL VALVE REPLACEMENT WITH MECHANICAL VALVE: ICD-10-CM

## 2020-02-19 DIAGNOSIS — T82.09XD: ICD-10-CM

## 2020-02-19 DIAGNOSIS — R55 SYNCOPE, NEAR: ICD-10-CM

## 2020-02-19 DIAGNOSIS — I10 ESSENTIAL HYPERTENSION: ICD-10-CM

## 2020-02-19 DIAGNOSIS — Z98.890 HX OF MITRAL VALVE REPAIR: ICD-10-CM

## 2020-02-19 DIAGNOSIS — I21.4 NSTEMI (NON-ST ELEVATED MYOCARDIAL INFARCTION) (HCC): Primary | ICD-10-CM

## 2020-02-19 LAB — INR PPP: 3.8

## 2020-02-19 PROCEDURE — 99214 OFFICE O/P EST MOD 30 MIN: CPT | Performed by: NURSE PRACTITIONER

## 2020-02-19 RX ORDER — RAMIPRIL 5 MG/1
5 CAPSULE ORAL DAILY
COMMUNITY
End: 2020-02-19 | Stop reason: SDUPTHER

## 2020-02-19 RX ORDER — RAMIPRIL 5 MG/1
5 CAPSULE ORAL DAILY
Qty: 90 CAPSULE | Refills: 4 | Status: SHIPPED | OUTPATIENT
Start: 2020-02-19 | End: 2021-04-09

## 2020-02-19 RX ORDER — BISOPROLOL FUMARATE 5 MG/1
5 TABLET, FILM COATED ORAL DAILY
Qty: 90 TABLET | Refills: 4 | Status: SHIPPED | OUTPATIENT
Start: 2020-02-19 | End: 2020-05-19

## 2020-02-24 ENCOUNTER — OFFICE VISIT (OUTPATIENT)
Dept: PULMONOLOGY | Facility: CLINIC | Age: 70
End: 2020-02-24

## 2020-02-24 VITALS
BODY MASS INDEX: 24.8 KG/M2 | HEIGHT: 67 IN | WEIGHT: 158 LBS | SYSTOLIC BLOOD PRESSURE: 108 MMHG | DIASTOLIC BLOOD PRESSURE: 68 MMHG | RESPIRATION RATE: 16 BRPM | HEART RATE: 87 BPM | OXYGEN SATURATION: 96 %

## 2020-02-24 DIAGNOSIS — J44.9 CHRONIC OBSTRUCTIVE PULMONARY DISEASE, UNSPECIFIED COPD TYPE (HCC): ICD-10-CM

## 2020-02-24 DIAGNOSIS — R06.02 SHORTNESS OF BREATH: Primary | ICD-10-CM

## 2020-02-24 DIAGNOSIS — G47.34 NOCTURNAL HYPOXIA: ICD-10-CM

## 2020-02-24 DIAGNOSIS — R09.02 EXERCISE HYPOXEMIA: ICD-10-CM

## 2020-02-24 PROCEDURE — 99214 OFFICE O/P EST MOD 30 MIN: CPT | Performed by: NURSE PRACTITIONER

## 2020-02-25 ENCOUNTER — OFFICE VISIT (OUTPATIENT)
Dept: INTERNAL MEDICINE | Facility: CLINIC | Age: 70
End: 2020-02-25

## 2020-02-25 VITALS
OXYGEN SATURATION: 92 % | DIASTOLIC BLOOD PRESSURE: 72 MMHG | TEMPERATURE: 97.6 F | HEIGHT: 67 IN | WEIGHT: 160.4 LBS | HEART RATE: 61 BPM | BODY MASS INDEX: 25.18 KG/M2 | SYSTOLIC BLOOD PRESSURE: 110 MMHG

## 2020-02-25 DIAGNOSIS — I10 ESSENTIAL HYPERTENSION: Primary | ICD-10-CM

## 2020-02-25 DIAGNOSIS — F41.9 ANXIETY: ICD-10-CM

## 2020-02-25 DIAGNOSIS — F32.A DEPRESSION, UNSPECIFIED DEPRESSION TYPE: ICD-10-CM

## 2020-02-25 PROCEDURE — 99214 OFFICE O/P EST MOD 30 MIN: CPT | Performed by: FAMILY MEDICINE

## 2020-03-04 ENCOUNTER — ANTICOAGULATION VISIT (OUTPATIENT)
Dept: PHARMACY | Facility: HOSPITAL | Age: 70
End: 2020-03-04

## 2020-03-04 DIAGNOSIS — Z95.2 HX OF MITRAL VALVE REPLACEMENT WITH MECHANICAL VALVE: ICD-10-CM

## 2020-03-04 LAB — INR PPP: 2.9

## 2020-03-08 PROBLEM — F32.A DEPRESSION: Status: ACTIVE | Noted: 2020-03-08

## 2020-03-09 RX ORDER — WARFARIN SODIUM 2 MG/1
TABLET ORAL
Qty: 126 TABLET | Refills: 0 | Status: SHIPPED | OUTPATIENT
Start: 2020-03-09 | End: 2021-01-18

## 2020-03-18 ENCOUNTER — ANTICOAGULATION VISIT (OUTPATIENT)
Dept: PHARMACY | Facility: HOSPITAL | Age: 70
End: 2020-03-18

## 2020-03-18 DIAGNOSIS — Z95.2 HX OF MITRAL VALVE REPLACEMENT WITH MECHANICAL VALVE: ICD-10-CM

## 2020-03-18 LAB — INR PPP: 5.1

## 2020-03-20 ENCOUNTER — ANTICOAGULATION VISIT (OUTPATIENT)
Dept: PHARMACY | Facility: HOSPITAL | Age: 70
End: 2020-03-20

## 2020-03-20 DIAGNOSIS — Z95.2 HX OF MITRAL VALVE REPLACEMENT WITH MECHANICAL VALVE: ICD-10-CM

## 2020-03-20 LAB — INR PPP: 3

## 2020-03-26 ENCOUNTER — ANTICOAGULATION VISIT (OUTPATIENT)
Dept: PHARMACY | Facility: HOSPITAL | Age: 70
End: 2020-03-26

## 2020-03-26 DIAGNOSIS — Z95.2 HX OF MITRAL VALVE REPLACEMENT WITH MECHANICAL VALVE: ICD-10-CM

## 2020-03-26 LAB — INR PPP: 4

## 2020-04-01 ENCOUNTER — ANTICOAGULATION VISIT (OUTPATIENT)
Dept: PHARMACY | Facility: HOSPITAL | Age: 70
End: 2020-04-01

## 2020-04-01 DIAGNOSIS — Z95.2 HX OF MITRAL VALVE REPLACEMENT WITH MECHANICAL VALVE: ICD-10-CM

## 2020-04-01 LAB — INR PPP: 5.8

## 2020-04-07 ENCOUNTER — ANTICOAGULATION VISIT (OUTPATIENT)
Dept: PHARMACY | Facility: HOSPITAL | Age: 70
End: 2020-04-07

## 2020-04-07 DIAGNOSIS — Z95.2 HX OF MITRAL VALVE REPLACEMENT WITH MECHANICAL VALVE: ICD-10-CM

## 2020-04-07 LAB — INR PPP: 3.1

## 2020-04-15 ENCOUNTER — ANTICOAGULATION VISIT (OUTPATIENT)
Dept: PHARMACY | Facility: HOSPITAL | Age: 70
End: 2020-04-15

## 2020-04-15 DIAGNOSIS — Z95.2 HX OF MITRAL VALVE REPLACEMENT WITH MECHANICAL VALVE: ICD-10-CM

## 2020-04-15 LAB — INR PPP: 3.7

## 2020-04-23 ENCOUNTER — ANTICOAGULATION VISIT (OUTPATIENT)
Dept: PHARMACY | Facility: HOSPITAL | Age: 70
End: 2020-04-23

## 2020-04-23 DIAGNOSIS — Z95.2 HX OF MITRAL VALVE REPLACEMENT WITH MECHANICAL VALVE: ICD-10-CM

## 2020-04-23 LAB — INR PPP: 3

## 2020-05-01 ENCOUNTER — ANTICOAGULATION VISIT (OUTPATIENT)
Dept: PHARMACY | Facility: HOSPITAL | Age: 70
End: 2020-05-01

## 2020-05-01 DIAGNOSIS — Z95.2 HX OF MITRAL VALVE REPLACEMENT WITH MECHANICAL VALVE: ICD-10-CM

## 2020-05-01 LAB — INR PPP: 2.8

## 2020-05-11 ENCOUNTER — ANTICOAGULATION VISIT (OUTPATIENT)
Dept: PHARMACY | Facility: HOSPITAL | Age: 70
End: 2020-05-11

## 2020-05-11 DIAGNOSIS — Z95.2 HX OF MITRAL VALVE REPLACEMENT WITH MECHANICAL VALVE: ICD-10-CM

## 2020-05-11 LAB — INR PPP: 2.4

## 2020-05-14 ENCOUNTER — ANTICOAGULATION VISIT (OUTPATIENT)
Dept: PHARMACY | Facility: HOSPITAL | Age: 70
End: 2020-05-14

## 2020-05-14 DIAGNOSIS — Z95.2 HX OF MITRAL VALVE REPLACEMENT WITH MECHANICAL VALVE: ICD-10-CM

## 2020-05-14 LAB — INR PPP: 3.1

## 2020-05-22 ENCOUNTER — ANTICOAGULATION VISIT (OUTPATIENT)
Dept: PHARMACY | Facility: HOSPITAL | Age: 70
End: 2020-05-22

## 2020-05-22 DIAGNOSIS — Z95.2 HX OF MITRAL VALVE REPLACEMENT WITH MECHANICAL VALVE: ICD-10-CM

## 2020-05-22 LAB — INR PPP: 4

## 2020-06-01 ENCOUNTER — ANTICOAGULATION VISIT (OUTPATIENT)
Dept: PHARMACY | Facility: HOSPITAL | Age: 70
End: 2020-06-01

## 2020-06-01 DIAGNOSIS — Z95.2 HX OF MITRAL VALVE REPLACEMENT WITH MECHANICAL VALVE: ICD-10-CM

## 2020-06-01 LAB — INR PPP: 4.2

## 2020-06-09 ENCOUNTER — ANTICOAGULATION VISIT (OUTPATIENT)
Dept: PHARMACY | Facility: HOSPITAL | Age: 70
End: 2020-06-09

## 2020-06-09 DIAGNOSIS — Z95.2 HX OF MITRAL VALVE REPLACEMENT WITH MECHANICAL VALVE: ICD-10-CM

## 2020-06-09 LAB — INR PPP: 3.9

## 2020-06-19 ENCOUNTER — ANTICOAGULATION VISIT (OUTPATIENT)
Dept: PHARMACY | Facility: HOSPITAL | Age: 70
End: 2020-06-19

## 2020-06-19 DIAGNOSIS — Z95.2 HX OF MITRAL VALVE REPLACEMENT WITH MECHANICAL VALVE: ICD-10-CM

## 2020-06-19 LAB
INR PPP: 3.1
INR PPP: 3.2

## 2020-06-19 RX ORDER — WARFARIN SODIUM 2 MG/1
2 TABLET ORAL SEE ADMIN INSTRUCTIONS
Qty: 60 TABLET | Refills: 0 | Status: SHIPPED | OUTPATIENT
Start: 2020-06-19 | End: 2020-07-13 | Stop reason: SDUPTHER

## 2020-06-19 RX ORDER — WARFARIN SODIUM 2 MG/1
TABLET ORAL
Qty: 126 TABLET | Refills: 0 | OUTPATIENT
Start: 2020-06-19

## 2020-06-23 ENCOUNTER — ANTICOAGULATION VISIT (OUTPATIENT)
Dept: PHARMACY | Facility: HOSPITAL | Age: 70
End: 2020-06-23

## 2020-06-23 DIAGNOSIS — Z95.2 HX OF MITRAL VALVE REPLACEMENT WITH MECHANICAL VALVE: ICD-10-CM

## 2020-06-23 LAB — INR PPP: 4.6

## 2020-06-29 ENCOUNTER — ANTICOAGULATION VISIT (OUTPATIENT)
Dept: PHARMACY | Facility: HOSPITAL | Age: 70
End: 2020-06-29

## 2020-06-29 DIAGNOSIS — Z95.2 HX OF MITRAL VALVE REPLACEMENT WITH MECHANICAL VALVE: ICD-10-CM

## 2020-06-29 LAB — INR PPP: 4

## 2020-07-07 ENCOUNTER — ANTICOAGULATION VISIT (OUTPATIENT)
Dept: PHARMACY | Facility: HOSPITAL | Age: 70
End: 2020-07-07

## 2020-07-07 ENCOUNTER — TELEPHONE (OUTPATIENT)
Dept: PHARMACY | Facility: HOSPITAL | Age: 70
End: 2020-07-07

## 2020-07-07 DIAGNOSIS — Z95.2 HX OF MITRAL VALVE REPLACEMENT WITH MECHANICAL VALVE: ICD-10-CM

## 2020-07-07 RX ORDER — BISOPROLOL FUMARATE 5 MG/1
5 TABLET, FILM COATED ORAL DAILY
COMMUNITY
Start: 2020-06-13 | End: 2021-04-09 | Stop reason: SDUPTHER

## 2020-07-08 LAB — INR PPP: 4

## 2020-07-13 RX ORDER — WARFARIN SODIUM 2 MG/1
2 TABLET ORAL SEE ADMIN INSTRUCTIONS
Qty: 114 TABLET | Refills: 1 | Status: SHIPPED | OUTPATIENT
Start: 2020-07-13 | End: 2021-01-18 | Stop reason: SDUPTHER

## 2020-07-15 ENCOUNTER — TELEPHONE (OUTPATIENT)
Dept: INTERNAL MEDICINE | Facility: CLINIC | Age: 70
End: 2020-07-15

## 2020-07-16 ENCOUNTER — ANTICOAGULATION VISIT (OUTPATIENT)
Dept: PHARMACY | Facility: HOSPITAL | Age: 70
End: 2020-07-16

## 2020-07-16 DIAGNOSIS — Z95.2 HX OF MITRAL VALVE REPLACEMENT WITH MECHANICAL VALVE: ICD-10-CM

## 2020-07-16 LAB — INR PPP: 3.2

## 2020-07-24 ENCOUNTER — ANTICOAGULATION VISIT (OUTPATIENT)
Dept: PHARMACY | Facility: HOSPITAL | Age: 70
End: 2020-07-24

## 2020-07-24 DIAGNOSIS — Z95.2 HX OF MITRAL VALVE REPLACEMENT WITH MECHANICAL VALVE: ICD-10-CM

## 2020-07-24 LAB — INR PPP: 3.6 (ref 3–4)

## 2020-08-07 ENCOUNTER — ANTICOAGULATION VISIT (OUTPATIENT)
Dept: PHARMACY | Facility: HOSPITAL | Age: 70
End: 2020-08-07

## 2020-08-07 DIAGNOSIS — Z95.2 HX OF MITRAL VALVE REPLACEMENT WITH MECHANICAL VALVE: ICD-10-CM

## 2020-08-07 LAB — INR PPP: 3.6

## 2020-08-12 RX ORDER — PRAVASTATIN SODIUM 80 MG/1
TABLET ORAL
Qty: 90 TABLET | Refills: 0 | Status: SHIPPED | OUTPATIENT
Start: 2020-08-12 | End: 2020-12-02

## 2020-08-17 ENCOUNTER — TELEPHONE (OUTPATIENT)
Dept: PHARMACY | Facility: HOSPITAL | Age: 70
End: 2020-08-17

## 2020-08-25 ENCOUNTER — ANTICOAGULATION VISIT (OUTPATIENT)
Dept: PHARMACY | Facility: HOSPITAL | Age: 70
End: 2020-08-25

## 2020-08-25 DIAGNOSIS — Z95.2 HX OF MITRAL VALVE REPLACEMENT WITH MECHANICAL VALVE: ICD-10-CM

## 2020-08-25 LAB — INR PPP: 5.3

## 2020-08-28 ENCOUNTER — ANTICOAGULATION VISIT (OUTPATIENT)
Dept: PHARMACY | Facility: HOSPITAL | Age: 70
End: 2020-08-28

## 2020-08-28 DIAGNOSIS — Z95.2 HX OF MITRAL VALVE REPLACEMENT WITH MECHANICAL VALVE: ICD-10-CM

## 2020-08-28 LAB — INR PPP: 2.9

## 2020-09-11 ENCOUNTER — ANTICOAGULATION VISIT (OUTPATIENT)
Dept: PHARMACY | Facility: HOSPITAL | Age: 70
End: 2020-09-11

## 2020-09-11 DIAGNOSIS — Z95.2 HX OF MITRAL VALVE REPLACEMENT WITH MECHANICAL VALVE: ICD-10-CM

## 2020-09-11 LAB — INR PPP: 2.9

## 2020-09-18 ENCOUNTER — ANTICOAGULATION VISIT (OUTPATIENT)
Dept: PHARMACY | Facility: HOSPITAL | Age: 70
End: 2020-09-18

## 2020-09-18 DIAGNOSIS — Z95.2 HX OF MITRAL VALVE REPLACEMENT WITH MECHANICAL VALVE: ICD-10-CM

## 2020-09-18 LAB — INR PPP: 4

## 2020-09-25 ENCOUNTER — ANTICOAGULATION VISIT (OUTPATIENT)
Dept: PHARMACY | Facility: HOSPITAL | Age: 70
End: 2020-09-25

## 2020-09-25 DIAGNOSIS — Z95.2 HX OF MITRAL VALVE REPLACEMENT WITH MECHANICAL VALVE: ICD-10-CM

## 2020-09-25 LAB — INR PPP: 3.6

## 2020-10-05 ENCOUNTER — ANTICOAGULATION VISIT (OUTPATIENT)
Dept: PHARMACY | Facility: HOSPITAL | Age: 70
End: 2020-10-05

## 2020-10-05 DIAGNOSIS — Z95.2 HX OF MITRAL VALVE REPLACEMENT WITH MECHANICAL VALVE: ICD-10-CM

## 2020-10-05 LAB — INR PPP: 3.2

## 2020-10-15 ENCOUNTER — OFFICE VISIT (OUTPATIENT)
Dept: PULMONOLOGY | Facility: CLINIC | Age: 70
End: 2020-10-15

## 2020-10-15 VITALS
DIASTOLIC BLOOD PRESSURE: 72 MMHG | HEIGHT: 67 IN | OXYGEN SATURATION: 92 % | SYSTOLIC BLOOD PRESSURE: 122 MMHG | TEMPERATURE: 97.1 F | HEART RATE: 56 BPM | RESPIRATION RATE: 18 BRPM | WEIGHT: 163 LBS | BODY MASS INDEX: 25.58 KG/M2

## 2020-10-15 DIAGNOSIS — R06.02 SHORTNESS OF BREATH: Primary | ICD-10-CM

## 2020-10-15 DIAGNOSIS — J44.9 CHRONIC OBSTRUCTIVE PULMONARY DISEASE, UNSPECIFIED COPD TYPE (HCC): ICD-10-CM

## 2020-10-15 DIAGNOSIS — R09.02 EXERCISE HYPOXEMIA: ICD-10-CM

## 2020-10-15 DIAGNOSIS — G47.34 NOCTURNAL HYPOXIA: ICD-10-CM

## 2020-10-15 DIAGNOSIS — Z87.891 PERSONAL HISTORY OF TOBACCO USE, PRESENTING HAZARDS TO HEALTH: ICD-10-CM

## 2020-10-15 PROCEDURE — 90670 PCV13 VACCINE IM: CPT | Performed by: INTERNAL MEDICINE

## 2020-10-15 PROCEDURE — G0009 ADMIN PNEUMOCOCCAL VACCINE: HCPCS | Performed by: INTERNAL MEDICINE

## 2020-10-15 PROCEDURE — 99214 OFFICE O/P EST MOD 30 MIN: CPT | Performed by: INTERNAL MEDICINE

## 2020-10-20 ENCOUNTER — ANTICOAGULATION VISIT (OUTPATIENT)
Dept: PHARMACY | Facility: HOSPITAL | Age: 70
End: 2020-10-20

## 2020-10-20 DIAGNOSIS — Z95.2 HX OF MITRAL VALVE REPLACEMENT WITH MECHANICAL VALVE: ICD-10-CM

## 2020-10-20 LAB — INR PPP: 2.7

## 2020-10-27 ENCOUNTER — ANTICOAGULATION VISIT (OUTPATIENT)
Dept: PHARMACY | Facility: HOSPITAL | Age: 70
End: 2020-10-27

## 2020-10-27 DIAGNOSIS — Z95.2 HX OF MITRAL VALVE REPLACEMENT WITH MECHANICAL VALVE: ICD-10-CM

## 2020-10-27 LAB — INR PPP: 3

## 2020-10-30 ENCOUNTER — HOSPITAL ENCOUNTER (OUTPATIENT)
Dept: CT IMAGING | Facility: HOSPITAL | Age: 70
Discharge: HOME | End: 2020-10-30
Admitting: INTERNAL MEDICINE

## 2020-10-30 DIAGNOSIS — Z87.891 PERSONAL HISTORY OF TOBACCO USE, PRESENTING HAZARDS TO HEALTH: ICD-10-CM

## 2020-10-30 PROCEDURE — G0297 LDCT FOR LUNG CA SCREEN: HCPCS

## 2020-11-02 ENCOUNTER — ANTICOAGULATION VISIT (OUTPATIENT)
Dept: PHARMACY | Facility: HOSPITAL | Age: 70
End: 2020-11-02

## 2020-11-02 DIAGNOSIS — Z95.2 HX OF MITRAL VALVE REPLACEMENT WITH MECHANICAL VALVE: ICD-10-CM

## 2020-11-02 LAB — INR PPP: 3.5

## 2020-11-02 RX ORDER — PAROXETINE HYDROCHLORIDE 20 MG/1
TABLET, FILM COATED ORAL
Qty: 90 TABLET | Refills: 0 | Status: SHIPPED | OUTPATIENT
Start: 2020-11-02 | End: 2021-02-01

## 2020-11-11 ENCOUNTER — ANTICOAGULATION VISIT (OUTPATIENT)
Dept: PHARMACY | Facility: HOSPITAL | Age: 70
End: 2020-11-11

## 2020-11-11 DIAGNOSIS — Z95.2 HX OF MITRAL VALVE REPLACEMENT WITH MECHANICAL VALVE: ICD-10-CM

## 2020-11-11 LAB — INR PPP: 3.6

## 2020-11-19 ENCOUNTER — ANTICOAGULATION VISIT (OUTPATIENT)
Dept: PHARMACY | Facility: HOSPITAL | Age: 70
End: 2020-11-19

## 2020-11-19 DIAGNOSIS — Z95.2 HX OF MITRAL VALVE REPLACEMENT WITH MECHANICAL VALVE: ICD-10-CM

## 2020-11-19 LAB — INR PPP: 4.1

## 2020-11-25 ENCOUNTER — ANTICOAGULATION VISIT (OUTPATIENT)
Dept: PHARMACY | Facility: HOSPITAL | Age: 70
End: 2020-11-25

## 2020-11-25 DIAGNOSIS — Z95.2 HX OF MITRAL VALVE REPLACEMENT WITH MECHANICAL VALVE: ICD-10-CM

## 2020-11-25 LAB — INR PPP: 2.9

## 2020-12-02 RX ORDER — PRAVASTATIN SODIUM 80 MG/1
TABLET ORAL
Qty: 90 TABLET | Refills: 0 | Status: SHIPPED | OUTPATIENT
Start: 2020-12-02 | End: 2021-04-09 | Stop reason: SDUPTHER

## 2020-12-03 ENCOUNTER — ANTICOAGULATION VISIT (OUTPATIENT)
Dept: PHARMACY | Facility: HOSPITAL | Age: 70
End: 2020-12-03

## 2020-12-03 DIAGNOSIS — Z95.2 HX OF MITRAL VALVE REPLACEMENT WITH MECHANICAL VALVE: ICD-10-CM

## 2020-12-03 LAB — INR PPP: 3.6

## 2020-12-11 ENCOUNTER — ANTICOAGULATION VISIT (OUTPATIENT)
Dept: PHARMACY | Facility: HOSPITAL | Age: 70
End: 2020-12-11

## 2020-12-11 DIAGNOSIS — Z95.2 HX OF MITRAL VALVE REPLACEMENT WITH MECHANICAL VALVE: ICD-10-CM

## 2020-12-11 LAB — INR PPP: 4.8

## 2020-12-16 ENCOUNTER — TELEPHONE (OUTPATIENT)
Dept: PHARMACY | Facility: HOSPITAL | Age: 70
End: 2020-12-16

## 2020-12-17 ENCOUNTER — ANTICOAGULATION VISIT (OUTPATIENT)
Dept: PHARMACY | Facility: HOSPITAL | Age: 70
End: 2020-12-17

## 2020-12-17 DIAGNOSIS — Z95.2 HX OF MITRAL VALVE REPLACEMENT WITH MECHANICAL VALVE: ICD-10-CM

## 2020-12-17 LAB — INR PPP: 4.9

## 2020-12-22 ENCOUNTER — ANTICOAGULATION VISIT (OUTPATIENT)
Dept: PHARMACY | Facility: HOSPITAL | Age: 70
End: 2020-12-22

## 2020-12-22 DIAGNOSIS — Z95.2 HX OF MITRAL VALVE REPLACEMENT WITH MECHANICAL VALVE: ICD-10-CM

## 2020-12-22 LAB — INR PPP: 4.3

## 2020-12-29 ENCOUNTER — ANTICOAGULATION VISIT (OUTPATIENT)
Dept: PHARMACY | Facility: HOSPITAL | Age: 70
End: 2020-12-29

## 2020-12-29 DIAGNOSIS — Z95.2 HX OF MITRAL VALVE REPLACEMENT WITH MECHANICAL VALVE: ICD-10-CM

## 2020-12-29 LAB — INR PPP: 4.1

## 2021-01-05 ENCOUNTER — ANTICOAGULATION VISIT (OUTPATIENT)
Dept: PHARMACY | Facility: HOSPITAL | Age: 71
End: 2021-01-05

## 2021-01-05 DIAGNOSIS — Z95.2 HX OF MITRAL VALVE REPLACEMENT WITH MECHANICAL VALVE: ICD-10-CM

## 2021-01-05 LAB — INR PPP: 3.3

## 2021-01-12 ENCOUNTER — ANTICOAGULATION VISIT (OUTPATIENT)
Dept: PHARMACY | Facility: HOSPITAL | Age: 71
End: 2021-01-12

## 2021-01-12 DIAGNOSIS — Z95.2 HX OF MITRAL VALVE REPLACEMENT WITH MECHANICAL VALVE: ICD-10-CM

## 2021-01-12 LAB — INR PPP: 4.3

## 2021-01-18 RX ORDER — WARFARIN SODIUM 2 MG/1
TABLET ORAL
Qty: 114 TABLET | Refills: 1 | Status: SHIPPED | OUTPATIENT
Start: 2021-01-18 | End: 2021-05-26 | Stop reason: SDUPTHER

## 2021-01-19 ENCOUNTER — ANTICOAGULATION VISIT (OUTPATIENT)
Dept: PHARMACY | Facility: HOSPITAL | Age: 71
End: 2021-01-19

## 2021-01-19 DIAGNOSIS — Z95.2 HX OF MITRAL VALVE REPLACEMENT WITH MECHANICAL VALVE: ICD-10-CM

## 2021-01-19 LAB — INR PPP: 4.9

## 2021-01-20 ENCOUNTER — LAB (OUTPATIENT)
Dept: LAB | Facility: HOSPITAL | Age: 71
End: 2021-01-20

## 2021-01-20 ENCOUNTER — ANTICOAGULATION VISIT (OUTPATIENT)
Dept: PHARMACY | Facility: HOSPITAL | Age: 71
End: 2021-01-20

## 2021-01-20 DIAGNOSIS — Z95.2 HX OF MITRAL VALVE REPLACEMENT WITH MECHANICAL VALVE: Primary | ICD-10-CM

## 2021-01-20 DIAGNOSIS — Z95.2 HX OF MITRAL VALVE REPLACEMENT WITH MECHANICAL VALVE: ICD-10-CM

## 2021-01-20 LAB
INR PPP: 2.89 (ref 0.9–1.1)
PROTHROMBIN TIME: 32.2 SECONDS (ref 12–15.1)

## 2021-01-20 PROCEDURE — 36415 COLL VENOUS BLD VENIPUNCTURE: CPT

## 2021-01-20 PROCEDURE — 85610 PROTHROMBIN TIME: CPT

## 2021-01-25 DIAGNOSIS — R06.02 SHORTNESS OF BREATH: ICD-10-CM

## 2021-01-25 DIAGNOSIS — J44.9 CHRONIC OBSTRUCTIVE PULMONARY DISEASE, UNSPECIFIED COPD TYPE (HCC): ICD-10-CM

## 2021-02-01 DIAGNOSIS — J44.9 CHRONIC OBSTRUCTIVE PULMONARY DISEASE, UNSPECIFIED COPD TYPE (HCC): ICD-10-CM

## 2021-02-01 DIAGNOSIS — R06.02 SHORTNESS OF BREATH: Primary | ICD-10-CM

## 2021-02-01 RX ORDER — PAROXETINE HYDROCHLORIDE 20 MG/1
TABLET, FILM COATED ORAL
Qty: 90 TABLET | Refills: 0 | Status: SHIPPED | OUTPATIENT
Start: 2021-02-01 | End: 2021-04-09 | Stop reason: DRUGHIGH

## 2021-02-02 ENCOUNTER — TELEPHONE (OUTPATIENT)
Dept: PHARMACY | Facility: HOSPITAL | Age: 71
End: 2021-02-02

## 2021-02-03 ENCOUNTER — ANTICOAGULATION VISIT (OUTPATIENT)
Dept: PHARMACY | Facility: HOSPITAL | Age: 71
End: 2021-02-03

## 2021-02-03 ENCOUNTER — LAB (OUTPATIENT)
Dept: LAB | Facility: HOSPITAL | Age: 71
End: 2021-02-03

## 2021-02-03 DIAGNOSIS — Z95.2 HX OF MITRAL VALVE REPLACEMENT WITH MECHANICAL VALVE: ICD-10-CM

## 2021-02-03 LAB
INR PPP: 3.03 (ref 0.9–1.1)
PROTHROMBIN TIME: 33.5 SECONDS (ref 12–15.1)

## 2021-02-03 PROCEDURE — 85610 PROTHROMBIN TIME: CPT

## 2021-02-03 PROCEDURE — 36415 COLL VENOUS BLD VENIPUNCTURE: CPT

## 2021-02-09 ENCOUNTER — ANTICOAGULATION VISIT (OUTPATIENT)
Dept: PHARMACY | Facility: HOSPITAL | Age: 71
End: 2021-02-09

## 2021-02-09 DIAGNOSIS — Z95.2 HX OF MITRAL VALVE REPLACEMENT WITH MECHANICAL VALVE: ICD-10-CM

## 2021-02-09 LAB — INR PPP: 3.7

## 2021-02-23 ENCOUNTER — ANTICOAGULATION VISIT (OUTPATIENT)
Dept: PHARMACY | Facility: HOSPITAL | Age: 71
End: 2021-02-23

## 2021-02-23 DIAGNOSIS — Z95.2 HX OF MITRAL VALVE REPLACEMENT WITH MECHANICAL VALVE: ICD-10-CM

## 2021-02-23 LAB — INR PPP: 3.5

## 2021-03-02 ENCOUNTER — OFFICE VISIT (OUTPATIENT)
Dept: INTERNAL MEDICINE | Facility: CLINIC | Age: 71
End: 2021-03-02

## 2021-03-02 ENCOUNTER — ANTICOAGULATION VISIT (OUTPATIENT)
Dept: PHARMACY | Facility: HOSPITAL | Age: 71
End: 2021-03-02

## 2021-03-02 VITALS
SYSTOLIC BLOOD PRESSURE: 116 MMHG | OXYGEN SATURATION: 99 % | HEART RATE: 78 BPM | BODY MASS INDEX: 25.66 KG/M2 | DIASTOLIC BLOOD PRESSURE: 80 MMHG | TEMPERATURE: 98 F | HEIGHT: 67 IN | WEIGHT: 163.5 LBS

## 2021-03-02 DIAGNOSIS — Z95.2 HX OF MITRAL VALVE REPLACEMENT WITH MECHANICAL VALVE: ICD-10-CM

## 2021-03-02 DIAGNOSIS — M25.511 CHRONIC RIGHT SHOULDER PAIN: ICD-10-CM

## 2021-03-02 DIAGNOSIS — G89.29 CHRONIC RIGHT SHOULDER PAIN: ICD-10-CM

## 2021-03-02 DIAGNOSIS — R21 RASH: Primary | ICD-10-CM

## 2021-03-02 LAB — INR PPP: 4.2

## 2021-03-02 PROCEDURE — 99214 OFFICE O/P EST MOD 30 MIN: CPT | Performed by: FAMILY MEDICINE

## 2021-03-02 RX ORDER — TRIAMCINOLONE ACETONIDE 5 MG/G
CREAM TOPICAL 2 TIMES DAILY
Qty: 15 G | Refills: 1 | Status: ON HOLD | OUTPATIENT
Start: 2021-03-02 | End: 2021-06-20

## 2021-03-03 ENCOUNTER — TELEPHONE (OUTPATIENT)
Dept: INTERNAL MEDICINE | Facility: CLINIC | Age: 71
End: 2021-03-03

## 2021-03-09 ENCOUNTER — ANTICOAGULATION VISIT (OUTPATIENT)
Dept: PHARMACY | Facility: HOSPITAL | Age: 71
End: 2021-03-09

## 2021-03-09 DIAGNOSIS — Z95.2 HX OF MITRAL VALVE REPLACEMENT WITH MECHANICAL VALVE: ICD-10-CM

## 2021-03-09 LAB — INR PPP: 2.9

## 2021-03-17 ENCOUNTER — ANTICOAGULATION VISIT (OUTPATIENT)
Dept: PHARMACY | Facility: HOSPITAL | Age: 71
End: 2021-03-17

## 2021-03-17 DIAGNOSIS — Z95.2 HX OF MITRAL VALVE REPLACEMENT WITH MECHANICAL VALVE: ICD-10-CM

## 2021-03-17 LAB — INR PPP: 2.9

## 2021-03-19 ENCOUNTER — IMMUNIZATION (OUTPATIENT)
Dept: VACCINE CLINIC | Facility: HOSPITAL | Age: 71
End: 2021-03-19

## 2021-03-19 PROCEDURE — 91300 HC SARSCOV02 VAC 30MCG/0.3ML IM: CPT | Performed by: INTERNAL MEDICINE

## 2021-03-19 PROCEDURE — 0001A: CPT | Performed by: INTERNAL MEDICINE

## 2021-03-22 DIAGNOSIS — J44.9 CHRONIC OBSTRUCTIVE PULMONARY DISEASE, UNSPECIFIED COPD TYPE (HCC): Primary | ICD-10-CM

## 2021-03-24 ENCOUNTER — ANTICOAGULATION VISIT (OUTPATIENT)
Dept: PHARMACY | Facility: HOSPITAL | Age: 71
End: 2021-03-24

## 2021-03-24 DIAGNOSIS — Z95.2 HX OF MITRAL VALVE REPLACEMENT WITH MECHANICAL VALVE: ICD-10-CM

## 2021-03-24 LAB — INR PPP: 2.8

## 2021-04-01 ENCOUNTER — ANTICOAGULATION VISIT (OUTPATIENT)
Dept: PHARMACY | Facility: HOSPITAL | Age: 71
End: 2021-04-01

## 2021-04-01 DIAGNOSIS — Z95.2 HX OF MITRAL VALVE REPLACEMENT WITH MECHANICAL VALVE: ICD-10-CM

## 2021-04-01 LAB — INR PPP: 2

## 2021-04-05 RX ORDER — TIOTROPIUM BROMIDE AND OLODATEROL 3.124; 2.736 UG/1; UG/1
SPRAY, METERED RESPIRATORY (INHALATION)
Qty: 4 G | Refills: 3 | Status: SHIPPED | OUTPATIENT
Start: 2021-04-05 | End: 2021-04-09

## 2021-04-09 ENCOUNTER — IMMUNIZATION (OUTPATIENT)
Dept: VACCINE CLINIC | Facility: HOSPITAL | Age: 71
End: 2021-04-09

## 2021-04-09 ENCOUNTER — ANTICOAGULATION VISIT (OUTPATIENT)
Dept: PHARMACY | Facility: HOSPITAL | Age: 71
End: 2021-04-09

## 2021-04-09 ENCOUNTER — OFFICE VISIT (OUTPATIENT)
Dept: INTERNAL MEDICINE | Facility: CLINIC | Age: 71
End: 2021-04-09

## 2021-04-09 VITALS
BODY MASS INDEX: 25.71 KG/M2 | HEIGHT: 67 IN | SYSTOLIC BLOOD PRESSURE: 115 MMHG | OXYGEN SATURATION: 96 % | DIASTOLIC BLOOD PRESSURE: 68 MMHG | HEART RATE: 82 BPM | WEIGHT: 163.8 LBS | TEMPERATURE: 98 F

## 2021-04-09 DIAGNOSIS — Z12.11 SCREEN FOR COLON CANCER: ICD-10-CM

## 2021-04-09 DIAGNOSIS — F32.A DEPRESSION, UNSPECIFIED DEPRESSION TYPE: ICD-10-CM

## 2021-04-09 DIAGNOSIS — Z95.2 HX OF MITRAL VALVE REPLACEMENT WITH MECHANICAL VALVE: ICD-10-CM

## 2021-04-09 DIAGNOSIS — F41.9 ANXIETY: ICD-10-CM

## 2021-04-09 DIAGNOSIS — Z71.89 ADVANCED DIRECTIVES, COUNSELING/DISCUSSION: ICD-10-CM

## 2021-04-09 DIAGNOSIS — I21.4 NSTEMI (NON-ST ELEVATED MYOCARDIAL INFARCTION) (HCC): ICD-10-CM

## 2021-04-09 DIAGNOSIS — I10 ESSENTIAL HYPERTENSION: ICD-10-CM

## 2021-04-09 DIAGNOSIS — Z00.00 MEDICARE ANNUAL WELLNESS VISIT, SUBSEQUENT: Primary | ICD-10-CM

## 2021-04-09 LAB — INR PPP: 3.7

## 2021-04-09 PROCEDURE — G0439 PPPS, SUBSEQ VISIT: HCPCS | Performed by: FAMILY MEDICINE

## 2021-04-09 PROCEDURE — 99497 ADVNCD CARE PLAN 30 MIN: CPT | Performed by: FAMILY MEDICINE

## 2021-04-09 PROCEDURE — 91300 HC SARSCOV02 VAC 30MCG/0.3ML IM: CPT | Performed by: INTERNAL MEDICINE

## 2021-04-09 PROCEDURE — 0002A: CPT | Performed by: INTERNAL MEDICINE

## 2021-04-09 RX ORDER — ALBUTEROL SULFATE 90 UG/1
2 AEROSOL, METERED RESPIRATORY (INHALATION) EVERY 4 HOURS PRN
Qty: 18 G | Refills: 5 | Status: SHIPPED | OUTPATIENT
Start: 2021-04-09 | End: 2022-01-19 | Stop reason: SDUPTHER

## 2021-04-09 RX ORDER — BISOPROLOL FUMARATE 5 MG/1
5 TABLET, FILM COATED ORAL DAILY
Qty: 90 TABLET | Refills: 3 | Status: SHIPPED | OUTPATIENT
Start: 2021-04-09 | End: 2021-11-20

## 2021-04-09 RX ORDER — PAROXETINE 30 MG/1
30 TABLET, FILM COATED ORAL EVERY MORNING
Qty: 90 TABLET | Refills: 3 | Status: SHIPPED | OUTPATIENT
Start: 2021-04-09 | End: 2022-04-15 | Stop reason: SDUPTHER

## 2021-04-09 RX ORDER — PRAVASTATIN SODIUM 80 MG/1
80 TABLET ORAL NIGHTLY
Qty: 90 TABLET | Refills: 3 | Status: SHIPPED | OUTPATIENT
Start: 2021-04-09 | End: 2022-04-15 | Stop reason: SDUPTHER

## 2021-04-15 ENCOUNTER — OFFICE VISIT (OUTPATIENT)
Dept: PULMONOLOGY | Facility: CLINIC | Age: 71
End: 2021-04-15

## 2021-04-15 VITALS
BODY MASS INDEX: 25.74 KG/M2 | SYSTOLIC BLOOD PRESSURE: 122 MMHG | DIASTOLIC BLOOD PRESSURE: 84 MMHG | OXYGEN SATURATION: 95 % | HEIGHT: 67 IN | WEIGHT: 164 LBS | HEART RATE: 80 BPM | RESPIRATION RATE: 16 BRPM

## 2021-04-15 DIAGNOSIS — G47.34 NOCTURNAL HYPOXIA: ICD-10-CM

## 2021-04-15 DIAGNOSIS — Z87.891 PERSONAL HISTORY OF TOBACCO USE, PRESENTING HAZARDS TO HEALTH: ICD-10-CM

## 2021-04-15 DIAGNOSIS — R06.02 SHORTNESS OF BREATH: ICD-10-CM

## 2021-04-15 DIAGNOSIS — J44.9 CHRONIC OBSTRUCTIVE PULMONARY DISEASE, UNSPECIFIED COPD TYPE (HCC): Primary | ICD-10-CM

## 2021-04-15 DIAGNOSIS — R09.02 EXERCISE HYPOXEMIA: ICD-10-CM

## 2021-04-15 PROCEDURE — 94618 PULMONARY STRESS TESTING: CPT | Performed by: NURSE PRACTITIONER

## 2021-04-15 PROCEDURE — 99214 OFFICE O/P EST MOD 30 MIN: CPT | Performed by: NURSE PRACTITIONER

## 2021-04-20 ENCOUNTER — TELEPHONE (OUTPATIENT)
Dept: PHARMACY | Facility: HOSPITAL | Age: 71
End: 2021-04-20

## 2021-04-26 RX ORDER — TIOTROPIUM BROMIDE INHALATION SPRAY 3.12 UG/1
2 SPRAY, METERED RESPIRATORY (INHALATION) DAILY
Qty: 4 G | Refills: 5 | Status: SHIPPED | OUTPATIENT
Start: 2021-04-26 | End: 2022-01-19 | Stop reason: SDUPTHER

## 2021-04-30 ENCOUNTER — ANTICOAGULATION VISIT (OUTPATIENT)
Dept: PHARMACY | Facility: HOSPITAL | Age: 71
End: 2021-04-30

## 2021-04-30 DIAGNOSIS — Z95.2 HX OF MITRAL VALVE REPLACEMENT WITH MECHANICAL VALVE: Primary | ICD-10-CM

## 2021-04-30 LAB — INR PPP: 2.6

## 2021-05-07 ENCOUNTER — ANTICOAGULATION VISIT (OUTPATIENT)
Dept: PHARMACY | Facility: HOSPITAL | Age: 71
End: 2021-05-07

## 2021-05-07 DIAGNOSIS — Z95.2 HX OF MITRAL VALVE REPLACEMENT WITH MECHANICAL VALVE: Primary | ICD-10-CM

## 2021-05-07 LAB — INR PPP: 3.2

## 2021-05-18 ENCOUNTER — ANTICOAGULATION VISIT (OUTPATIENT)
Dept: PHARMACY | Facility: HOSPITAL | Age: 71
End: 2021-05-18

## 2021-05-18 DIAGNOSIS — Z95.2 HX OF MITRAL VALVE REPLACEMENT WITH MECHANICAL VALVE: Primary | ICD-10-CM

## 2021-05-18 LAB — INR PPP: 3.6

## 2021-05-26 ENCOUNTER — ANTICOAGULATION VISIT (OUTPATIENT)
Dept: PHARMACY | Facility: HOSPITAL | Age: 71
End: 2021-05-26

## 2021-05-26 DIAGNOSIS — Z95.2 HX OF MITRAL VALVE REPLACEMENT WITH MECHANICAL VALVE: Primary | ICD-10-CM

## 2021-05-26 LAB — INR PPP: 3.7

## 2021-05-26 RX ORDER — WARFARIN SODIUM 2 MG/1
TABLET ORAL
Qty: 114 TABLET | Refills: 1 | Status: SHIPPED | OUTPATIENT
Start: 2021-05-26 | End: 2021-11-08

## 2021-06-07 ENCOUNTER — ANTICOAGULATION VISIT (OUTPATIENT)
Dept: PHARMACY | Facility: HOSPITAL | Age: 71
End: 2021-06-07

## 2021-06-07 DIAGNOSIS — Z95.2 HX OF MITRAL VALVE REPLACEMENT WITH MECHANICAL VALVE: Primary | ICD-10-CM

## 2021-06-07 LAB — INR PPP: 2.6

## 2021-06-19 ENCOUNTER — HOSPITAL ENCOUNTER (INPATIENT)
Facility: HOSPITAL | Age: 71
LOS: 2 days | Discharge: HOME | End: 2021-06-23
Attending: EMERGENCY MEDICINE | Admitting: INTERNAL MEDICINE

## 2021-06-19 DIAGNOSIS — Z79.01 ANTICOAGULATED: ICD-10-CM

## 2021-06-19 DIAGNOSIS — K62.5 RECTAL BLEEDING: Primary | ICD-10-CM

## 2021-06-19 DIAGNOSIS — K62.5 PAINLESS RECTAL BLEEDING: ICD-10-CM

## 2021-06-19 PROBLEM — J44.9 COPD WITH HYPOXIA (HCC): Chronic | Status: ACTIVE | Noted: 2021-06-19

## 2021-06-19 PROBLEM — I25.10 CAD (CORONARY ARTERY DISEASE): Chronic | Status: ACTIVE | Noted: 2021-06-19

## 2021-06-19 PROBLEM — R09.02 COPD WITH HYPOXIA: Chronic | Status: ACTIVE | Noted: 2021-06-19

## 2021-06-19 LAB
ALBUMIN SERPL-MCNC: 4.2 G/DL (ref 3.5–5.2)
ALBUMIN/GLOB SERPL: 1.7 G/DL
ALP SERPL-CCNC: 99 U/L (ref 39–117)
ALT SERPL W P-5'-P-CCNC: 13 U/L (ref 1–33)
ANION GAP SERPL CALCULATED.3IONS-SCNC: 8 MMOL/L (ref 5–15)
AST SERPL-CCNC: 23 U/L (ref 1–32)
BASOPHILS # BLD AUTO: 0.05 10*3/MM3 (ref 0–0.2)
BASOPHILS NFR BLD AUTO: 0.7 % (ref 0–1.5)
BILIRUB SERPL-MCNC: 0.8 MG/DL (ref 0–1.2)
BUN SERPL-MCNC: 15 MG/DL (ref 8–23)
BUN/CREAT SERPL: 22.4 (ref 7–25)
CALCIUM SPEC-SCNC: 9.9 MG/DL (ref 8.6–10.5)
CHLORIDE SERPL-SCNC: 104 MMOL/L (ref 98–107)
CO2 SERPL-SCNC: 31 MMOL/L (ref 22–29)
CREAT SERPL-MCNC: 0.67 MG/DL (ref 0.57–1)
DEPRECATED RDW RBC AUTO: 43.7 FL (ref 37–54)
EOSINOPHIL # BLD AUTO: 0.12 10*3/MM3 (ref 0–0.4)
EOSINOPHIL NFR BLD AUTO: 1.7 % (ref 0.3–6.2)
ERYTHROCYTE [DISTWIDTH] IN BLOOD BY AUTOMATED COUNT: 13.4 % (ref 12.3–15.4)
GFR SERPL CREATININE-BSD FRML MDRD: 87 ML/MIN/1.73
GLOBULIN UR ELPH-MCNC: 2.5 GM/DL
GLUCOSE SERPL-MCNC: 113 MG/DL (ref 65–99)
HCT VFR BLD AUTO: 37.9 % (ref 34–46.6)
HCT VFR BLD AUTO: 40.4 % (ref 34–46.6)
HGB BLD-MCNC: 11.9 G/DL (ref 12–15.9)
HGB BLD-MCNC: 12.7 G/DL (ref 12–15.9)
IMM GRANULOCYTES # BLD AUTO: 0.03 10*3/MM3 (ref 0–0.05)
IMM GRANULOCYTES NFR BLD AUTO: 0.4 % (ref 0–0.5)
INR PPP: 2.99 (ref 0.85–1.16)
LYMPHOCYTES # BLD AUTO: 1 10*3/MM3 (ref 0.7–3.1)
LYMPHOCYTES NFR BLD AUTO: 14.2 % (ref 19.6–45.3)
MCH RBC QN AUTO: 28 PG (ref 26.6–33)
MCHC RBC AUTO-ENTMCNC: 31.4 G/DL (ref 31.5–35.7)
MCV RBC AUTO: 89 FL (ref 79–97)
MONOCYTES # BLD AUTO: 0.75 10*3/MM3 (ref 0.1–0.9)
MONOCYTES NFR BLD AUTO: 10.7 % (ref 5–12)
NEUTROPHILS NFR BLD AUTO: 5.07 10*3/MM3 (ref 1.7–7)
NEUTROPHILS NFR BLD AUTO: 72.3 % (ref 42.7–76)
NRBC BLD AUTO-RTO: 0 /100 WBC (ref 0–0.2)
PLATELET # BLD AUTO: 179 10*3/MM3 (ref 140–450)
PMV BLD AUTO: 10.8 FL (ref 6–12)
POTASSIUM SERPL-SCNC: 3.9 MMOL/L (ref 3.5–5.2)
PROT SERPL-MCNC: 6.7 G/DL (ref 6–8.5)
PROTHROMBIN TIME: 29.8 SECONDS (ref 11.4–14.4)
RBC # BLD AUTO: 4.54 10*6/MM3 (ref 3.77–5.28)
SODIUM SERPL-SCNC: 143 MMOL/L (ref 136–145)
WBC # BLD AUTO: 7.02 10*3/MM3 (ref 3.4–10.8)

## 2021-06-19 PROCEDURE — 85610 PROTHROMBIN TIME: CPT | Performed by: EMERGENCY MEDICINE

## 2021-06-19 PROCEDURE — 85025 COMPLETE CBC W/AUTO DIFF WBC: CPT | Performed by: EMERGENCY MEDICINE

## 2021-06-19 PROCEDURE — G0378 HOSPITAL OBSERVATION PER HR: HCPCS

## 2021-06-19 PROCEDURE — 85018 HEMOGLOBIN: CPT | Performed by: INTERNAL MEDICINE

## 2021-06-19 PROCEDURE — 80053 COMPREHEN METABOLIC PANEL: CPT | Performed by: EMERGENCY MEDICINE

## 2021-06-19 PROCEDURE — 93005 ELECTROCARDIOGRAM TRACING: CPT | Performed by: EMERGENCY MEDICINE

## 2021-06-19 PROCEDURE — 99220 PR INITIAL OBSERVATION CARE/DAY 70 MINUTES: CPT | Performed by: INTERNAL MEDICINE

## 2021-06-19 PROCEDURE — 85014 HEMATOCRIT: CPT | Performed by: INTERNAL MEDICINE

## 2021-06-19 PROCEDURE — 99284 EMERGENCY DEPT VISIT MOD MDM: CPT

## 2021-06-19 RX ORDER — IPRATROPIUM BROMIDE AND ALBUTEROL SULFATE 2.5; .5 MG/3ML; MG/3ML
3 SOLUTION RESPIRATORY (INHALATION) EVERY 4 HOURS PRN
Status: DISCONTINUED | OUTPATIENT
Start: 2021-06-19 | End: 2021-06-23 | Stop reason: HOSPADM

## 2021-06-19 RX ORDER — ACETAMINOPHEN 650 MG/1
650 SUPPOSITORY RECTAL EVERY 4 HOURS PRN
Status: DISCONTINUED | OUTPATIENT
Start: 2021-06-19 | End: 2021-06-23 | Stop reason: HOSPADM

## 2021-06-19 RX ORDER — BISOPROLOL FUMARATE 5 MG/1
5 TABLET, FILM COATED ORAL DAILY
Status: DISCONTINUED | OUTPATIENT
Start: 2021-06-19 | End: 2021-06-23 | Stop reason: HOSPADM

## 2021-06-19 RX ORDER — SODIUM CHLORIDE 0.9 % (FLUSH) 0.9 %
10 SYRINGE (ML) INJECTION EVERY 12 HOURS SCHEDULED
Status: DISCONTINUED | OUTPATIENT
Start: 2021-06-19 | End: 2021-06-23 | Stop reason: HOSPADM

## 2021-06-19 RX ORDER — BUDESONIDE AND FORMOTEROL FUMARATE DIHYDRATE 160; 4.5 UG/1; UG/1
2 AEROSOL RESPIRATORY (INHALATION)
Status: DISCONTINUED | OUTPATIENT
Start: 2021-06-19 | End: 2021-06-23 | Stop reason: HOSPADM

## 2021-06-19 RX ORDER — PRAVASTATIN SODIUM 40 MG
80 TABLET ORAL NIGHTLY
Status: DISCONTINUED | OUTPATIENT
Start: 2021-06-19 | End: 2021-06-23 | Stop reason: HOSPADM

## 2021-06-19 RX ORDER — ACETAMINOPHEN 160 MG/5ML
650 SOLUTION ORAL EVERY 4 HOURS PRN
Status: DISCONTINUED | OUTPATIENT
Start: 2021-06-19 | End: 2021-06-23 | Stop reason: HOSPADM

## 2021-06-19 RX ORDER — SODIUM CHLORIDE 0.9 % (FLUSH) 0.9 %
10 SYRINGE (ML) INJECTION AS NEEDED
Status: DISCONTINUED | OUTPATIENT
Start: 2021-06-19 | End: 2021-06-23 | Stop reason: HOSPADM

## 2021-06-19 RX ORDER — ACETAMINOPHEN 325 MG/1
650 TABLET ORAL EVERY 4 HOURS PRN
Status: DISCONTINUED | OUTPATIENT
Start: 2021-06-19 | End: 2021-06-23 | Stop reason: HOSPADM

## 2021-06-19 RX ADMIN — PRAVASTATIN SODIUM 80 MG: 40 TABLET ORAL at 22:03

## 2021-06-19 RX ADMIN — SODIUM CHLORIDE, PRESERVATIVE FREE 10 ML: 5 INJECTION INTRAVENOUS at 23:23

## 2021-06-19 RX ADMIN — PAROXETINE HYDROCHLORIDE 30 MG: 20 TABLET, FILM COATED ORAL at 22:03

## 2021-06-19 RX ADMIN — BISOPROLOL FUMARATE 5 MG: 5 TABLET, FILM COATED ORAL at 22:03

## 2021-06-20 LAB
ANION GAP SERPL CALCULATED.3IONS-SCNC: 7 MMOL/L (ref 5–15)
APTT PPP: 56.9 SECONDS (ref 50–95)
BASOPHILS # BLD AUTO: 0.06 10*3/MM3 (ref 0–0.2)
BASOPHILS NFR BLD AUTO: 0.7 % (ref 0–1.5)
BUN SERPL-MCNC: 13 MG/DL (ref 8–23)
BUN/CREAT SERPL: 21 (ref 7–25)
CALCIUM SPEC-SCNC: 8.9 MG/DL (ref 8.6–10.5)
CHLORIDE SERPL-SCNC: 106 MMOL/L (ref 98–107)
CO2 SERPL-SCNC: 28 MMOL/L (ref 22–29)
CREAT SERPL-MCNC: 0.62 MG/DL (ref 0.57–1)
DEPRECATED RDW RBC AUTO: 44.1 FL (ref 37–54)
EOSINOPHIL # BLD AUTO: 0.13 10*3/MM3 (ref 0–0.4)
EOSINOPHIL NFR BLD AUTO: 1.6 % (ref 0.3–6.2)
ERYTHROCYTE [DISTWIDTH] IN BLOOD BY AUTOMATED COUNT: 13.4 % (ref 12.3–15.4)
GFR SERPL CREATININE-BSD FRML MDRD: 95 ML/MIN/1.73
GLUCOSE SERPL-MCNC: 107 MG/DL (ref 65–99)
HCT VFR BLD AUTO: 38.7 % (ref 34–46.6)
HCT VFR BLD AUTO: 39.5 % (ref 34–46.6)
HCT VFR BLD AUTO: 39.5 % (ref 34–46.6)
HGB BLD-MCNC: 12.1 G/DL (ref 12–15.9)
HGB BLD-MCNC: 12.4 G/DL (ref 12–15.9)
HGB BLD-MCNC: 12.4 G/DL (ref 12–15.9)
IMM GRANULOCYTES # BLD AUTO: 0.02 10*3/MM3 (ref 0–0.05)
IMM GRANULOCYTES NFR BLD AUTO: 0.2 % (ref 0–0.5)
INR PPP: 2.92 (ref 0.85–1.16)
LYMPHOCYTES # BLD AUTO: 1.02 10*3/MM3 (ref 0.7–3.1)
LYMPHOCYTES NFR BLD AUTO: 12.4 % (ref 19.6–45.3)
MCH RBC QN AUTO: 27.8 PG (ref 26.6–33)
MCHC RBC AUTO-ENTMCNC: 31.4 G/DL (ref 31.5–35.7)
MCV RBC AUTO: 88.6 FL (ref 79–97)
MONOCYTES # BLD AUTO: 0.69 10*3/MM3 (ref 0.1–0.9)
MONOCYTES NFR BLD AUTO: 8.4 % (ref 5–12)
NEUTROPHILS NFR BLD AUTO: 6.3 10*3/MM3 (ref 1.7–7)
NEUTROPHILS NFR BLD AUTO: 76.7 % (ref 42.7–76)
NRBC BLD AUTO-RTO: 0 /100 WBC (ref 0–0.2)
PLATELET # BLD AUTO: 195 10*3/MM3 (ref 140–450)
PMV BLD AUTO: 10.6 FL (ref 6–12)
POTASSIUM SERPL-SCNC: 4.5 MMOL/L (ref 3.5–5.2)
PROTHROMBIN TIME: 29.2 SECONDS (ref 11.4–14.4)
QT INTERVAL: 454 MS
QTC INTERVAL: 457 MS
RBC # BLD AUTO: 4.46 10*6/MM3 (ref 3.77–5.28)
SODIUM SERPL-SCNC: 141 MMOL/L (ref 136–145)
UFH PPP CHRO-ACNC: 0.1 IU/ML (ref 0.3–0.7)
UFH PPP CHRO-ACNC: 0.11 IU/ML (ref 0.3–0.7)
WBC # BLD AUTO: 8.22 10*3/MM3 (ref 3.4–10.8)

## 2021-06-20 PROCEDURE — 25010000002 HEPARIN (PORCINE) PER 1000 UNITS

## 2021-06-20 PROCEDURE — 25010000002 HEPARIN (PORCINE) 25000-0.45 UT/250ML-% SOLUTION: Performed by: INTERNAL MEDICINE

## 2021-06-20 PROCEDURE — 85610 PROTHROMBIN TIME: CPT | Performed by: INTERNAL MEDICINE

## 2021-06-20 PROCEDURE — G0378 HOSPITAL OBSERVATION PER HR: HCPCS

## 2021-06-20 PROCEDURE — 80048 BASIC METABOLIC PNL TOTAL CA: CPT | Performed by: INTERNAL MEDICINE

## 2021-06-20 PROCEDURE — 85025 COMPLETE CBC W/AUTO DIFF WBC: CPT | Performed by: INTERNAL MEDICINE

## 2021-06-20 PROCEDURE — 85520 HEPARIN ASSAY: CPT

## 2021-06-20 PROCEDURE — 99226 PR SBSQ OBSERVATION CARE/DAY 35 MINUTES: CPT | Performed by: INTERNAL MEDICINE

## 2021-06-20 PROCEDURE — 85730 THROMBOPLASTIN TIME PARTIAL: CPT | Performed by: INTERNAL MEDICINE

## 2021-06-20 PROCEDURE — 99222 1ST HOSP IP/OBS MODERATE 55: CPT | Performed by: INTERNAL MEDICINE

## 2021-06-20 PROCEDURE — 85014 HEMATOCRIT: CPT | Performed by: INTERNAL MEDICINE

## 2021-06-20 PROCEDURE — 85018 HEMOGLOBIN: CPT | Performed by: INTERNAL MEDICINE

## 2021-06-20 PROCEDURE — 85520 HEPARIN ASSAY: CPT | Performed by: INTERNAL MEDICINE

## 2021-06-20 RX ORDER — HEPARIN SODIUM 1000 [USP'U]/ML
2200 INJECTION, SOLUTION INTRAVENOUS; SUBCUTANEOUS ONCE
Status: COMPLETED | OUTPATIENT
Start: 2021-06-20 | End: 2021-06-20

## 2021-06-20 RX ORDER — HEPARIN SODIUM 10000 [USP'U]/100ML
14 INJECTION, SOLUTION INTRAVENOUS
Status: DISPENSED | OUTPATIENT
Start: 2021-06-20 | End: 2021-06-23

## 2021-06-20 RX ADMIN — PRAVASTATIN SODIUM 80 MG: 40 TABLET ORAL at 20:13

## 2021-06-20 RX ADMIN — MAGNESIUM HYDROXIDE 10 ML: 2400 SUSPENSION ORAL at 13:31

## 2021-06-20 RX ADMIN — SODIUM CHLORIDE, PRESERVATIVE FREE 10 ML: 5 INJECTION INTRAVENOUS at 08:40

## 2021-06-20 RX ADMIN — HEPARIN SODIUM 2200 UNITS: 1000 INJECTION, SOLUTION INTRAVENOUS; SUBCUTANEOUS at 20:13

## 2021-06-20 RX ADMIN — HEPARIN SODIUM 12 UNITS/KG/HR: 10000 INJECTION, SOLUTION INTRAVENOUS at 13:34

## 2021-06-20 RX ADMIN — PAROXETINE HYDROCHLORIDE 30 MG: 20 TABLET, FILM COATED ORAL at 20:13

## 2021-06-21 LAB
ANION GAP SERPL CALCULATED.3IONS-SCNC: 4 MMOL/L (ref 5–15)
BASOPHILS # BLD AUTO: 0.05 10*3/MM3 (ref 0–0.2)
BASOPHILS NFR BLD AUTO: 0.7 % (ref 0–1.5)
BUN SERPL-MCNC: 11 MG/DL (ref 8–23)
BUN/CREAT SERPL: 15.5 (ref 7–25)
CALCIUM SPEC-SCNC: 9.5 MG/DL (ref 8.6–10.5)
CHLORIDE SERPL-SCNC: 105 MMOL/L (ref 98–107)
CO2 SERPL-SCNC: 34 MMOL/L (ref 22–29)
CREAT SERPL-MCNC: 0.71 MG/DL (ref 0.57–1)
DEPRECATED RDW RBC AUTO: 43.6 FL (ref 37–54)
EOSINOPHIL # BLD AUTO: 0.21 10*3/MM3 (ref 0–0.4)
EOSINOPHIL NFR BLD AUTO: 2.8 % (ref 0.3–6.2)
ERYTHROCYTE [DISTWIDTH] IN BLOOD BY AUTOMATED COUNT: 13.4 % (ref 12.3–15.4)
GFR SERPL CREATININE-BSD FRML MDRD: 81 ML/MIN/1.73
GLUCOSE SERPL-MCNC: 127 MG/DL (ref 65–99)
HCT VFR BLD AUTO: 38.6 % (ref 34–46.6)
HGB BLD-MCNC: 11.9 G/DL (ref 12–15.9)
IMM GRANULOCYTES # BLD AUTO: 0.02 10*3/MM3 (ref 0–0.05)
IMM GRANULOCYTES NFR BLD AUTO: 0.3 % (ref 0–0.5)
INR PPP: 2.94 (ref 0.85–1.16)
LYMPHOCYTES # BLD AUTO: 1.29 10*3/MM3 (ref 0.7–3.1)
LYMPHOCYTES NFR BLD AUTO: 17.1 % (ref 19.6–45.3)
MCH RBC QN AUTO: 27.4 PG (ref 26.6–33)
MCHC RBC AUTO-ENTMCNC: 30.8 G/DL (ref 31.5–35.7)
MCV RBC AUTO: 88.7 FL (ref 79–97)
MONOCYTES # BLD AUTO: 0.75 10*3/MM3 (ref 0.1–0.9)
MONOCYTES NFR BLD AUTO: 10 % (ref 5–12)
NEUTROPHILS NFR BLD AUTO: 5.21 10*3/MM3 (ref 1.7–7)
NEUTROPHILS NFR BLD AUTO: 69.1 % (ref 42.7–76)
NRBC BLD AUTO-RTO: 0 /100 WBC (ref 0–0.2)
PLATELET # BLD AUTO: 174 10*3/MM3 (ref 140–450)
PMV BLD AUTO: 11 FL (ref 6–12)
POTASSIUM SERPL-SCNC: 4.9 MMOL/L (ref 3.5–5.2)
PROTHROMBIN TIME: 29.4 SECONDS (ref 11.4–14.4)
RBC # BLD AUTO: 4.35 10*6/MM3 (ref 3.77–5.28)
SODIUM SERPL-SCNC: 143 MMOL/L (ref 136–145)
UFH PPP CHRO-ACNC: 0.41 IU/ML (ref 0.3–0.7)
UFH PPP CHRO-ACNC: 0.44 IU/ML (ref 0.3–0.7)
WBC # BLD AUTO: 7.53 10*3/MM3 (ref 3.4–10.8)

## 2021-06-21 PROCEDURE — 85610 PROTHROMBIN TIME: CPT | Performed by: INTERNAL MEDICINE

## 2021-06-21 PROCEDURE — 25010000002 HEPARIN (PORCINE) 25000-0.45 UT/250ML-% SOLUTION

## 2021-06-21 PROCEDURE — 85520 HEPARIN ASSAY: CPT

## 2021-06-21 PROCEDURE — 80048 BASIC METABOLIC PNL TOTAL CA: CPT | Performed by: INTERNAL MEDICINE

## 2021-06-21 PROCEDURE — 99232 SBSQ HOSP IP/OBS MODERATE 35: CPT | Performed by: INTERNAL MEDICINE

## 2021-06-21 PROCEDURE — 99231 SBSQ HOSP IP/OBS SF/LOW 25: CPT | Performed by: PHYSICIAN ASSISTANT

## 2021-06-21 PROCEDURE — 85025 COMPLETE CBC W/AUTO DIFF WBC: CPT | Performed by: INTERNAL MEDICINE

## 2021-06-21 RX ADMIN — SODIUM CHLORIDE, PRESERVATIVE FREE 10 ML: 5 INJECTION INTRAVENOUS at 08:12

## 2021-06-21 RX ADMIN — PRAVASTATIN SODIUM 80 MG: 40 TABLET ORAL at 22:01

## 2021-06-21 RX ADMIN — HEPARIN SODIUM 14 UNITS/KG/HR: 10000 INJECTION, SOLUTION INTRAVENOUS at 13:58

## 2021-06-21 RX ADMIN — PAROXETINE HYDROCHLORIDE 30 MG: 20 TABLET, FILM COATED ORAL at 22:00

## 2021-06-21 RX ADMIN — BISOPROLOL FUMARATE 5 MG: 5 TABLET, FILM COATED ORAL at 08:12

## 2021-06-22 ENCOUNTER — ANESTHESIA EVENT (OUTPATIENT)
Dept: GASTROENTEROLOGY | Facility: HOSPITAL | Age: 71
End: 2021-06-22

## 2021-06-22 LAB
HCT VFR BLD AUTO: 37.8 % (ref 34–46.6)
HGB BLD-MCNC: 11.6 G/DL (ref 12–15.9)
INR PPP: 2.11 (ref 0.85–1.16)
PROTHROMBIN TIME: 22.8 SECONDS (ref 11.4–14.4)
UFH PPP CHRO-ACNC: 0.17 IU/ML (ref 0.3–0.7)
UFH PPP CHRO-ACNC: 0.84 IU/ML (ref 0.3–0.7)

## 2021-06-22 PROCEDURE — 99232 SBSQ HOSP IP/OBS MODERATE 35: CPT | Performed by: INTERNAL MEDICINE

## 2021-06-22 PROCEDURE — 85014 HEMATOCRIT: CPT | Performed by: INTERNAL MEDICINE

## 2021-06-22 PROCEDURE — 85520 HEPARIN ASSAY: CPT | Performed by: INTERNAL MEDICINE

## 2021-06-22 PROCEDURE — 25010000002 HEPARIN (PORCINE) PER 1000 UNITS: Performed by: INTERNAL MEDICINE

## 2021-06-22 PROCEDURE — 85610 PROTHROMBIN TIME: CPT | Performed by: INTERNAL MEDICINE

## 2021-06-22 PROCEDURE — 25010000002 HEPARIN (PORCINE) 25000-0.45 UT/250ML-% SOLUTION: Performed by: PHYSICIAN ASSISTANT

## 2021-06-22 PROCEDURE — 85018 HEMOGLOBIN: CPT | Performed by: INTERNAL MEDICINE

## 2021-06-22 PROCEDURE — 99232 SBSQ HOSP IP/OBS MODERATE 35: CPT | Performed by: PHYSICIAN ASSISTANT

## 2021-06-22 RX ORDER — HEPARIN SODIUM 1000 [USP'U]/ML
4000 INJECTION, SOLUTION INTRAVENOUS; SUBCUTANEOUS ONCE
Status: DISCONTINUED | OUTPATIENT
Start: 2021-06-22 | End: 2021-06-22

## 2021-06-22 RX ORDER — SODIUM, POTASSIUM,MAG SULFATES 17.5-3.13G
1 SOLUTION, RECONSTITUTED, ORAL ORAL EVERY 12 HOURS
Status: DISCONTINUED | OUTPATIENT
Start: 2021-06-22 | End: 2021-06-23 | Stop reason: HOSPADM

## 2021-06-22 RX ORDER — HEPARIN SODIUM 1000 [USP'U]/ML
3000 INJECTION, SOLUTION INTRAVENOUS; SUBCUTANEOUS ONCE
Status: COMPLETED | OUTPATIENT
Start: 2021-06-22 | End: 2021-06-22

## 2021-06-22 RX ADMIN — SODIUM SULFATE, POTASSIUM SULFATE, MAGNESIUM SULFATE 1 BOTTLE: 17.5; 3.13; 1.6 SOLUTION, CONCENTRATE ORAL at 20:03

## 2021-06-22 RX ADMIN — SODIUM CHLORIDE, PRESERVATIVE FREE 10 ML: 5 INJECTION INTRAVENOUS at 09:00

## 2021-06-22 RX ADMIN — PRAVASTATIN SODIUM 80 MG: 40 TABLET ORAL at 20:03

## 2021-06-22 RX ADMIN — PAROXETINE HYDROCHLORIDE 30 MG: 20 TABLET, FILM COATED ORAL at 20:03

## 2021-06-22 RX ADMIN — HEPARIN SODIUM 16 UNITS/KG/HR: 10000 INJECTION, SOLUTION INTRAVENOUS at 13:44

## 2021-06-22 RX ADMIN — HEPARIN SODIUM 3000 UNITS: 1000 INJECTION, SOLUTION INTRAVENOUS; SUBCUTANEOUS at 07:19

## 2021-06-23 ENCOUNTER — ANESTHESIA (OUTPATIENT)
Dept: GASTROENTEROLOGY | Facility: HOSPITAL | Age: 71
End: 2021-06-23

## 2021-06-23 ENCOUNTER — READMISSION MANAGEMENT (OUTPATIENT)
Dept: CALL CENTER | Facility: HOSPITAL | Age: 71
End: 2021-06-23

## 2021-06-23 VITALS
BODY MASS INDEX: 24.35 KG/M2 | SYSTOLIC BLOOD PRESSURE: 136 MMHG | RESPIRATION RATE: 20 BRPM | WEIGHT: 160.7 LBS | HEIGHT: 68 IN | DIASTOLIC BLOOD PRESSURE: 58 MMHG | TEMPERATURE: 97 F | OXYGEN SATURATION: 100 % | HEART RATE: 50 BPM

## 2021-06-23 LAB
INR PPP: 1.58 (ref 0.85–1.16)
PROTHROMBIN TIME: 18.3 SECONDS (ref 11.4–14.4)
UFH PPP CHRO-ACNC: 0.38 IU/ML (ref 0.3–0.7)

## 2021-06-23 PROCEDURE — 25010000002 ENOXAPARIN PER 10 MG: Performed by: INTERNAL MEDICINE

## 2021-06-23 PROCEDURE — 25010000002 PROPOFOL 10 MG/ML EMULSION: Performed by: NURSE ANESTHETIST, CERTIFIED REGISTERED

## 2021-06-23 PROCEDURE — 25010000002 HEPARIN (PORCINE) 25000-0.45 UT/250ML-% SOLUTION

## 2021-06-23 PROCEDURE — 99239 HOSP IP/OBS DSCHRG MGMT >30: CPT | Performed by: INTERNAL MEDICINE

## 2021-06-23 PROCEDURE — 85610 PROTHROMBIN TIME: CPT | Performed by: INTERNAL MEDICINE

## 2021-06-23 PROCEDURE — 85520 HEPARIN ASSAY: CPT | Performed by: INTERNAL MEDICINE

## 2021-06-23 PROCEDURE — 45378 DIAGNOSTIC COLONOSCOPY: CPT | Performed by: INTERNAL MEDICINE

## 2021-06-23 PROCEDURE — 0DJD8ZZ INSPECTION OF LOWER INTESTINAL TRACT, VIA NATURAL OR ARTIFICIAL OPENING ENDOSCOPIC: ICD-10-PCS | Performed by: INTERNAL MEDICINE

## 2021-06-23 RX ORDER — WARFARIN SODIUM 2.5 MG/1
2.5 TABLET ORAL
Status: DISCONTINUED | OUTPATIENT
Start: 2021-06-23 | End: 2021-06-23 | Stop reason: HOSPADM

## 2021-06-23 RX ORDER — FAMOTIDINE 20 MG/1
20 TABLET, FILM COATED ORAL ONCE
Status: DISCONTINUED | OUTPATIENT
Start: 2021-06-23 | End: 2021-06-23 | Stop reason: HOSPADM

## 2021-06-23 RX ORDER — PROPOFOL 10 MG/ML
VIAL (ML) INTRAVENOUS AS NEEDED
Status: DISCONTINUED | OUTPATIENT
Start: 2021-06-23 | End: 2021-06-23 | Stop reason: SURG

## 2021-06-23 RX ORDER — HYDROCORTISONE ACETATE 25 MG/1
25 SUPPOSITORY RECTAL 2 TIMES DAILY
Qty: 14 SUPPOSITORY | Refills: 0 | Status: SHIPPED | OUTPATIENT
Start: 2021-06-23 | End: 2021-06-23

## 2021-06-23 RX ORDER — ONDANSETRON 2 MG/ML
4 INJECTION INTRAMUSCULAR; INTRAVENOUS ONCE AS NEEDED
Status: DISCONTINUED | OUTPATIENT
Start: 2021-06-23 | End: 2021-06-23 | Stop reason: HOSPADM

## 2021-06-23 RX ORDER — SODIUM CHLORIDE, SODIUM LACTATE, POTASSIUM CHLORIDE, CALCIUM CHLORIDE 600; 310; 30; 20 MG/100ML; MG/100ML; MG/100ML; MG/100ML
9 INJECTION, SOLUTION INTRAVENOUS CONTINUOUS
Status: DISCONTINUED | OUTPATIENT
Start: 2021-06-23 | End: 2021-06-23

## 2021-06-23 RX ORDER — LIDOCAINE HYDROCHLORIDE 10 MG/ML
INJECTION, SOLUTION EPIDURAL; INFILTRATION; INTRACAUDAL; PERINEURAL AS NEEDED
Status: DISCONTINUED | OUTPATIENT
Start: 2021-06-23 | End: 2021-06-23 | Stop reason: SURG

## 2021-06-23 RX ORDER — FAMOTIDINE 10 MG/ML
20 INJECTION, SOLUTION INTRAVENOUS ONCE
Status: COMPLETED | OUTPATIENT
Start: 2021-06-23 | End: 2021-06-23

## 2021-06-23 RX ORDER — HYDROCORTISONE ACETATE 25 MG/1
25 SUPPOSITORY RECTAL 2 TIMES DAILY
Qty: 14 SUPPOSITORY | Refills: 0 | Status: SHIPPED | OUTPATIENT
Start: 2021-06-23 | End: 2021-06-30

## 2021-06-23 RX ORDER — LIDOCAINE HYDROCHLORIDE 10 MG/ML
0.5 INJECTION, SOLUTION EPIDURAL; INFILTRATION; INTRACAUDAL; PERINEURAL ONCE AS NEEDED
Status: DISCONTINUED | OUTPATIENT
Start: 2021-06-23 | End: 2021-06-23 | Stop reason: HOSPADM

## 2021-06-23 RX ORDER — MIDAZOLAM HYDROCHLORIDE 1 MG/ML
0.5 INJECTION INTRAMUSCULAR; INTRAVENOUS
Status: DISCONTINUED | OUTPATIENT
Start: 2021-06-23 | End: 2021-06-23 | Stop reason: HOSPADM

## 2021-06-23 RX ORDER — SODIUM CHLORIDE 0.9 % (FLUSH) 0.9 %
10 SYRINGE (ML) INJECTION AS NEEDED
Status: DISCONTINUED | OUTPATIENT
Start: 2021-06-23 | End: 2021-06-23 | Stop reason: HOSPADM

## 2021-06-23 RX ORDER — HEPARIN SODIUM 10000 [USP'U]/100ML
14 INJECTION, SOLUTION INTRAVENOUS
Status: DISCONTINUED | OUTPATIENT
Start: 2021-06-23 | End: 2021-06-23

## 2021-06-23 RX ORDER — SODIUM CHLORIDE 0.9 % (FLUSH) 0.9 %
10 SYRINGE (ML) INJECTION EVERY 12 HOURS SCHEDULED
Status: DISCONTINUED | OUTPATIENT
Start: 2021-06-23 | End: 2021-06-23 | Stop reason: HOSPADM

## 2021-06-23 RX ORDER — HYDROCORTISONE ACETATE 25 MG/1
25 SUPPOSITORY RECTAL 2 TIMES DAILY
Status: DISCONTINUED | OUTPATIENT
Start: 2021-06-23 | End: 2021-06-23 | Stop reason: HOSPADM

## 2021-06-23 RX ADMIN — ENOXAPARIN SODIUM 70 MG: 80 INJECTION SUBCUTANEOUS at 11:44

## 2021-06-23 RX ADMIN — LIDOCAINE HYDROCHLORIDE 50 MG: 10 INJECTION, SOLUTION EPIDURAL; INFILTRATION; INTRACAUDAL; PERINEURAL at 08:23

## 2021-06-23 RX ADMIN — PROPOFOL 80 MG: 10 INJECTION, EMULSION INTRAVENOUS at 08:23

## 2021-06-23 RX ADMIN — HEPARIN SODIUM 14 UNITS/KG/HR: 10000 INJECTION, SOLUTION INTRAVENOUS at 09:51

## 2021-06-23 RX ADMIN — PROPOFOL 140 MCG/KG/MIN: 10 INJECTION, EMULSION INTRAVENOUS at 08:24

## 2021-06-23 RX ADMIN — SODIUM SULFATE, POTASSIUM SULFATE, MAGNESIUM SULFATE 1 BOTTLE: 17.5; 3.13; 1.6 SOLUTION, CONCENTRATE ORAL at 04:53

## 2021-06-23 RX ADMIN — FAMOTIDINE 20 MG: 10 INJECTION INTRAVENOUS at 08:09

## 2021-06-23 RX ADMIN — SODIUM CHLORIDE, POTASSIUM CHLORIDE, SODIUM LACTATE AND CALCIUM CHLORIDE 9 ML/HR: 600; 310; 30; 20 INJECTION, SOLUTION INTRAVENOUS at 08:01

## 2021-06-23 RX ADMIN — SODIUM CHLORIDE, PRESERVATIVE FREE 10 ML: 5 INJECTION INTRAVENOUS at 09:51

## 2021-06-23 RX ADMIN — HYDROCORTISONE ACETATE 25 MG: 25 SUPPOSITORY RECTAL at 12:41

## 2021-06-24 ENCOUNTER — ANTICOAGULATION VISIT (OUTPATIENT)
Dept: PHARMACY | Facility: HOSPITAL | Age: 71
End: 2021-06-24

## 2021-06-24 ENCOUNTER — TRANSITIONAL CARE MANAGEMENT TELEPHONE ENCOUNTER (OUTPATIENT)
Dept: CALL CENTER | Facility: HOSPITAL | Age: 71
End: 2021-06-24

## 2021-06-24 ENCOUNTER — TELEPHONE (OUTPATIENT)
Dept: PHARMACY | Facility: HOSPITAL | Age: 71
End: 2021-06-24

## 2021-06-24 DIAGNOSIS — Z95.2 HX OF MITRAL VALVE REPLACEMENT WITH MECHANICAL VALVE: Primary | ICD-10-CM

## 2021-06-24 LAB — INR PPP: 1.3

## 2021-06-28 ENCOUNTER — ANTICOAGULATION VISIT (OUTPATIENT)
Dept: PHARMACY | Facility: HOSPITAL | Age: 71
End: 2021-06-28

## 2021-06-28 DIAGNOSIS — Z95.2 HX OF MITRAL VALVE REPLACEMENT WITH MECHANICAL VALVE: Primary | ICD-10-CM

## 2021-06-28 LAB — INR PPP: 1.8

## 2021-07-02 ENCOUNTER — ANTICOAGULATION VISIT (OUTPATIENT)
Dept: PHARMACY | Facility: HOSPITAL | Age: 71
End: 2021-07-02

## 2021-07-02 DIAGNOSIS — Z95.2 HX OF MITRAL VALVE REPLACEMENT WITH MECHANICAL VALVE: Primary | ICD-10-CM

## 2021-07-02 LAB — INR PPP: 3.2

## 2021-07-12 ENCOUNTER — ANTICOAGULATION VISIT (OUTPATIENT)
Dept: PHARMACY | Facility: HOSPITAL | Age: 71
End: 2021-07-12

## 2021-07-12 DIAGNOSIS — Z95.2 HX OF MITRAL VALVE REPLACEMENT WITH MECHANICAL VALVE: Primary | ICD-10-CM

## 2021-07-12 LAB — INR PPP: 3.4

## 2021-07-20 ENCOUNTER — ANTICOAGULATION VISIT (OUTPATIENT)
Dept: PHARMACY | Facility: HOSPITAL | Age: 71
End: 2021-07-20

## 2021-07-20 DIAGNOSIS — Z95.2 HX OF MITRAL VALVE REPLACEMENT WITH MECHANICAL VALVE: Primary | ICD-10-CM

## 2021-07-20 LAB — INR PPP: 3.2

## 2021-08-03 ENCOUNTER — ANTICOAGULATION VISIT (OUTPATIENT)
Dept: PHARMACY | Facility: HOSPITAL | Age: 71
End: 2021-08-03

## 2021-08-03 DIAGNOSIS — Z95.2 HX OF MITRAL VALVE REPLACEMENT WITH MECHANICAL VALVE: Primary | ICD-10-CM

## 2021-08-03 LAB — INR PPP: 4.1

## 2021-08-17 ENCOUNTER — ANTICOAGULATION VISIT (OUTPATIENT)
Dept: PHARMACY | Facility: HOSPITAL | Age: 71
End: 2021-08-17

## 2021-08-17 DIAGNOSIS — Z95.2 HX OF MITRAL VALVE REPLACEMENT WITH MECHANICAL VALVE: Primary | ICD-10-CM

## 2021-08-17 LAB — INR PPP: 5.5

## 2021-08-18 ENCOUNTER — ANTICOAGULATION VISIT (OUTPATIENT)
Dept: PHARMACY | Facility: HOSPITAL | Age: 71
End: 2021-08-18

## 2021-08-18 ENCOUNTER — LAB (OUTPATIENT)
Dept: LAB | Facility: HOSPITAL | Age: 71
End: 2021-08-18

## 2021-08-18 DIAGNOSIS — Z95.2 HX OF MITRAL VALVE REPLACEMENT WITH MECHANICAL VALVE: ICD-10-CM

## 2021-08-18 DIAGNOSIS — Z95.2 HX OF MITRAL VALVE REPLACEMENT WITH MECHANICAL VALVE: Primary | ICD-10-CM

## 2021-08-18 LAB
INR PPP: 3.4 (ref 0.85–1.16)
PROTHROMBIN TIME: 32.8 SECONDS (ref 11.4–14.4)

## 2021-08-18 PROCEDURE — 85610 PROTHROMBIN TIME: CPT

## 2021-08-18 PROCEDURE — 36415 COLL VENOUS BLD VENIPUNCTURE: CPT

## 2021-08-26 ENCOUNTER — ANTICOAGULATION VISIT (OUTPATIENT)
Dept: PHARMACY | Facility: HOSPITAL | Age: 71
End: 2021-08-26

## 2021-08-26 DIAGNOSIS — Z95.2 HX OF MITRAL VALVE REPLACEMENT WITH MECHANICAL VALVE: Primary | ICD-10-CM

## 2021-08-26 LAB — INR PPP: 4.2

## 2021-09-07 ENCOUNTER — ANTICOAGULATION VISIT (OUTPATIENT)
Dept: PHARMACY | Facility: HOSPITAL | Age: 71
End: 2021-09-07

## 2021-09-07 DIAGNOSIS — Z95.2 HX OF MITRAL VALVE REPLACEMENT WITH MECHANICAL VALVE: Primary | ICD-10-CM

## 2021-09-07 LAB — INR PPP: 3.6

## 2021-09-17 ENCOUNTER — ANTICOAGULATION VISIT (OUTPATIENT)
Dept: PHARMACY | Facility: HOSPITAL | Age: 71
End: 2021-09-17

## 2021-09-17 DIAGNOSIS — Z95.2 HX OF MITRAL VALVE REPLACEMENT WITH MECHANICAL VALVE: Primary | ICD-10-CM

## 2021-09-17 LAB — INR PPP: 4.1

## 2021-10-06 ENCOUNTER — TELEPHONE (OUTPATIENT)
Dept: PHARMACY | Facility: HOSPITAL | Age: 71
End: 2021-10-06

## 2021-10-08 ENCOUNTER — ANTICOAGULATION VISIT (OUTPATIENT)
Dept: PHARMACY | Facility: HOSPITAL | Age: 71
End: 2021-10-08

## 2021-10-08 DIAGNOSIS — Z95.2 HX OF MITRAL VALVE REPLACEMENT WITH MECHANICAL VALVE: Primary | ICD-10-CM

## 2021-10-08 LAB — INR PPP: 3.5

## 2021-11-02 ENCOUNTER — ANTICOAGULATION VISIT (OUTPATIENT)
Dept: PHARMACY | Facility: HOSPITAL | Age: 71
End: 2021-11-02

## 2021-11-02 DIAGNOSIS — Z95.2 HX OF MITRAL VALVE REPLACEMENT WITH MECHANICAL VALVE: Primary | ICD-10-CM

## 2021-11-02 LAB — INR PPP: 3.6

## 2021-11-02 RX ORDER — WARFARIN SODIUM 2 MG/1
TABLET ORAL
Qty: 114 TABLET | Refills: 0 | OUTPATIENT
Start: 2021-11-02

## 2021-11-05 RX ORDER — TIOTROPIUM BROMIDE AND OLODATEROL 3.124; 2.736 UG/1; UG/1
SPRAY, METERED RESPIRATORY (INHALATION)
Qty: 4 G | Refills: 0 | Status: SHIPPED | OUTPATIENT
Start: 2021-11-05 | End: 2021-12-30 | Stop reason: SDUPTHER

## 2021-11-08 RX ORDER — WARFARIN SODIUM 2 MG/1
TABLET ORAL
Qty: 114 TABLET | Refills: 0 | Status: SHIPPED | OUTPATIENT
Start: 2021-11-08 | End: 2022-02-08

## 2021-11-18 ENCOUNTER — ANTICOAGULATION VISIT (OUTPATIENT)
Dept: PHARMACY | Facility: HOSPITAL | Age: 71
End: 2021-11-18

## 2021-11-18 DIAGNOSIS — Z95.2 HX OF MITRAL VALVE REPLACEMENT WITH MECHANICAL VALVE: Primary | ICD-10-CM

## 2021-11-18 LAB — INR PPP: 4.2

## 2021-11-20 ENCOUNTER — APPOINTMENT (OUTPATIENT)
Dept: GENERAL RADIOLOGY | Facility: HOSPITAL | Age: 71
End: 2021-11-20

## 2021-11-20 ENCOUNTER — HOSPITAL ENCOUNTER (EMERGENCY)
Facility: HOSPITAL | Age: 71
Discharge: HOME | End: 2021-11-20
Attending: EMERGENCY MEDICINE | Admitting: EMERGENCY MEDICINE

## 2021-11-20 VITALS
TEMPERATURE: 98.4 F | SYSTOLIC BLOOD PRESSURE: 145 MMHG | OXYGEN SATURATION: 93 % | HEIGHT: 68 IN | RESPIRATION RATE: 14 BRPM | WEIGHT: 160 LBS | HEART RATE: 78 BPM | BODY MASS INDEX: 24.25 KG/M2 | DIASTOLIC BLOOD PRESSURE: 76 MMHG

## 2021-11-20 DIAGNOSIS — L03.116 LEFT LEG CELLULITIS: ICD-10-CM

## 2021-11-20 DIAGNOSIS — Z79.01 CHRONIC ANTICOAGULATION: ICD-10-CM

## 2021-11-20 DIAGNOSIS — Z95.2 HISTORY OF ARTIFICIAL HEART VALVE: ICD-10-CM

## 2021-11-20 DIAGNOSIS — M25.462 EFFUSION OF LEFT KNEE: Primary | ICD-10-CM

## 2021-11-20 LAB
ALBUMIN SERPL-MCNC: 4.6 G/DL (ref 3.5–5.2)
ALBUMIN/GLOB SERPL: 1.7 G/DL
ALP SERPL-CCNC: 105 U/L (ref 39–117)
ALT SERPL W P-5'-P-CCNC: 11 U/L (ref 1–33)
ANION GAP SERPL CALCULATED.3IONS-SCNC: 9 MMOL/L (ref 5–15)
AST SERPL-CCNC: 23 U/L (ref 1–32)
B PARAPERT DNA SPEC QL NAA+PROBE: NOT DETECTED
B PERT DNA SPEC QL NAA+PROBE: NOT DETECTED
BASOPHILS # BLD AUTO: 0.05 10*3/MM3 (ref 0–0.2)
BASOPHILS NFR BLD AUTO: 0.5 % (ref 0–1.5)
BILIRUB SERPL-MCNC: 0.9 MG/DL (ref 0–1.2)
BUN SERPL-MCNC: 19 MG/DL (ref 8–23)
BUN/CREAT SERPL: 27.1 (ref 7–25)
C PNEUM DNA NPH QL NAA+NON-PROBE: NOT DETECTED
CALCIUM SPEC-SCNC: 9.4 MG/DL (ref 8.6–10.5)
CHLORIDE SERPL-SCNC: 100 MMOL/L (ref 98–107)
CO2 SERPL-SCNC: 29 MMOL/L (ref 22–29)
CREAT SERPL-MCNC: 0.7 MG/DL (ref 0.57–1)
D DIMER PPP FEU-MCNC: 0.37 MCGFEU/ML (ref 0–0.56)
DEPRECATED RDW RBC AUTO: 44.2 FL (ref 37–54)
EOSINOPHIL # BLD AUTO: 0.12 10*3/MM3 (ref 0–0.4)
EOSINOPHIL NFR BLD AUTO: 1.1 % (ref 0.3–6.2)
ERYTHROCYTE [DISTWIDTH] IN BLOOD BY AUTOMATED COUNT: 13.7 % (ref 12.3–15.4)
FLUAV SUBTYP SPEC NAA+PROBE: NOT DETECTED
FLUBV RNA ISLT QL NAA+PROBE: NOT DETECTED
GFR SERPL CREATININE-BSD FRML MDRD: 82 ML/MIN/1.73
GLOBULIN UR ELPH-MCNC: 2.7 GM/DL
GLUCOSE SERPL-MCNC: 127 MG/DL (ref 65–99)
HADV DNA SPEC NAA+PROBE: NOT DETECTED
HCOV 229E RNA SPEC QL NAA+PROBE: NOT DETECTED
HCOV HKU1 RNA SPEC QL NAA+PROBE: NOT DETECTED
HCOV NL63 RNA SPEC QL NAA+PROBE: NOT DETECTED
HCOV OC43 RNA SPEC QL NAA+PROBE: NOT DETECTED
HCT VFR BLD AUTO: 39.7 % (ref 34–46.6)
HGB BLD-MCNC: 12.5 G/DL (ref 12–15.9)
HMPV RNA NPH QL NAA+NON-PROBE: NOT DETECTED
HOLD SPECIMEN: NORMAL
HPIV1 RNA ISLT QL NAA+PROBE: NOT DETECTED
HPIV2 RNA SPEC QL NAA+PROBE: NOT DETECTED
HPIV3 RNA NPH QL NAA+PROBE: NOT DETECTED
HPIV4 P GENE NPH QL NAA+PROBE: NOT DETECTED
IMM GRANULOCYTES # BLD AUTO: 0.03 10*3/MM3 (ref 0–0.05)
IMM GRANULOCYTES NFR BLD AUTO: 0.3 % (ref 0–0.5)
INR PPP: 2.8 (ref 0.85–1.16)
LYMPHOCYTES # BLD AUTO: 0.92 10*3/MM3 (ref 0.7–3.1)
LYMPHOCYTES NFR BLD AUTO: 8.6 % (ref 19.6–45.3)
M PNEUMO IGG SER IA-ACNC: NOT DETECTED
MCH RBC QN AUTO: 27.6 PG (ref 26.6–33)
MCHC RBC AUTO-ENTMCNC: 31.5 G/DL (ref 31.5–35.7)
MCV RBC AUTO: 87.6 FL (ref 79–97)
MONOCYTES # BLD AUTO: 0.94 10*3/MM3 (ref 0.1–0.9)
MONOCYTES NFR BLD AUTO: 8.8 % (ref 5–12)
NEUTROPHILS NFR BLD AUTO: 8.61 10*3/MM3 (ref 1.7–7)
NEUTROPHILS NFR BLD AUTO: 80.7 % (ref 42.7–76)
NRBC BLD AUTO-RTO: 0 /100 WBC (ref 0–0.2)
PLATELET # BLD AUTO: 179 10*3/MM3 (ref 140–450)
PMV BLD AUTO: 10.8 FL (ref 6–12)
POTASSIUM SERPL-SCNC: 4.3 MMOL/L (ref 3.5–5.2)
PROCALCITONIN SERPL-MCNC: 0.04 NG/ML (ref 0–0.25)
PROT SERPL-MCNC: 7.3 G/DL (ref 6–8.5)
PROTHROMBIN TIME: 28.3 SECONDS (ref 11.4–14.4)
RBC # BLD AUTO: 4.53 10*6/MM3 (ref 3.77–5.28)
RHINOVIRUS RNA SPEC NAA+PROBE: NOT DETECTED
RSV RNA NPH QL NAA+NON-PROBE: NOT DETECTED
SARS-COV-2 RNA NPH QL NAA+NON-PROBE: NOT DETECTED
SODIUM SERPL-SCNC: 138 MMOL/L (ref 136–145)
TSH SERPL DL<=0.05 MIU/L-ACNC: 1.47 UIU/ML (ref 0.27–4.2)
WBC NRBC COR # BLD: 10.67 10*3/MM3 (ref 3.4–10.8)
WHOLE BLOOD HOLD SPECIMEN: NORMAL
WHOLE BLOOD HOLD SPECIMEN: NORMAL

## 2021-11-20 PROCEDURE — 85025 COMPLETE CBC W/AUTO DIFF WBC: CPT | Performed by: EMERGENCY MEDICINE

## 2021-11-20 PROCEDURE — 80053 COMPREHEN METABOLIC PANEL: CPT | Performed by: EMERGENCY MEDICINE

## 2021-11-20 PROCEDURE — 84145 PROCALCITONIN (PCT): CPT | Performed by: EMERGENCY MEDICINE

## 2021-11-20 PROCEDURE — 85379 FIBRIN DEGRADATION QUANT: CPT | Performed by: EMERGENCY MEDICINE

## 2021-11-20 PROCEDURE — 63710000001 ONDANSETRON PER 8 MG: Performed by: EMERGENCY MEDICINE

## 2021-11-20 PROCEDURE — 85610 PROTHROMBIN TIME: CPT | Performed by: EMERGENCY MEDICINE

## 2021-11-20 PROCEDURE — 96365 THER/PROPH/DIAG IV INF INIT: CPT

## 2021-11-20 PROCEDURE — 84443 ASSAY THYROID STIM HORMONE: CPT | Performed by: EMERGENCY MEDICINE

## 2021-11-20 PROCEDURE — 99283 EMERGENCY DEPT VISIT LOW MDM: CPT

## 2021-11-20 PROCEDURE — 73560 X-RAY EXAM OF KNEE 1 OR 2: CPT

## 2021-11-20 PROCEDURE — 0202U NFCT DS 22 TRGT SARS-COV-2: CPT | Performed by: EMERGENCY MEDICINE

## 2021-11-20 PROCEDURE — 25010000002 CEFTRIAXONE PER 250 MG: Performed by: EMERGENCY MEDICINE

## 2021-11-20 RX ORDER — BACITRACIN, NEOMYCIN, POLYMYXIN B 400; 3.5; 5 [USP'U]/G; MG/G; [USP'U]/G
1 OINTMENT TOPICAL ONCE
Status: COMPLETED | OUTPATIENT
Start: 2021-11-20 | End: 2021-11-20

## 2021-11-20 RX ORDER — ONDANSETRON 4 MG/1
4 TABLET, FILM COATED ORAL ONCE
Status: COMPLETED | OUTPATIENT
Start: 2021-11-20 | End: 2021-11-20

## 2021-11-20 RX ORDER — DOXYCYCLINE HYCLATE 100 MG/1
100 TABLET, DELAYED RELEASE ORAL 2 TIMES DAILY
Qty: 14 TABLET | Refills: 0 | Status: SHIPPED | OUTPATIENT
Start: 2021-11-20 | End: 2021-11-27

## 2021-11-20 RX ORDER — HYDROCODONE BITARTRATE AND ACETAMINOPHEN 5; 325 MG/1; MG/1
1 TABLET ORAL ONCE
Status: COMPLETED | OUTPATIENT
Start: 2021-11-20 | End: 2021-11-20

## 2021-11-20 RX ORDER — SODIUM CHLORIDE 0.9 % (FLUSH) 0.9 %
10 SYRINGE (ML) INJECTION AS NEEDED
Status: DISCONTINUED | OUTPATIENT
Start: 2021-11-20 | End: 2021-11-20 | Stop reason: HOSPADM

## 2021-11-20 RX ORDER — HYDROCODONE BITARTRATE AND ACETAMINOPHEN 5; 325 MG/1; MG/1
1 TABLET ORAL EVERY 6 HOURS PRN
Qty: 15 TABLET | Refills: 0 | Status: SHIPPED | OUTPATIENT
Start: 2021-11-20 | End: 2021-12-21

## 2021-11-20 RX ADMIN — HYDROCODONE BITARTRATE AND ACETAMINOPHEN 1 TABLET: 5; 325 TABLET ORAL at 08:24

## 2021-11-20 RX ADMIN — ONDANSETRON HYDROCHLORIDE 4 MG: 4 TABLET, FILM COATED ORAL at 08:24

## 2021-11-20 RX ADMIN — BACITRACIN, NEOMYCIN, POLYMYXIN B 1 APPLICATION: 400; 3.5; 5 OINTMENT TOPICAL at 10:22

## 2021-11-20 RX ADMIN — SODIUM CHLORIDE 1 G: 900 INJECTION INTRAVENOUS at 10:24

## 2021-11-22 ENCOUNTER — ANTICOAGULATION VISIT (OUTPATIENT)
Dept: PHARMACY | Facility: HOSPITAL | Age: 71
End: 2021-11-22

## 2021-11-22 DIAGNOSIS — Z95.2 HX OF MITRAL VALVE REPLACEMENT WITH MECHANICAL VALVE: Primary | ICD-10-CM

## 2021-11-22 LAB — INR PPP: 3.7

## 2021-11-23 ENCOUNTER — TELEPHONE (OUTPATIENT)
Dept: ORTHOPEDIC SURGERY | Facility: CLINIC | Age: 71
End: 2021-11-23

## 2021-11-29 ENCOUNTER — OFFICE VISIT (OUTPATIENT)
Dept: ORTHOPEDIC SURGERY | Facility: CLINIC | Age: 71
End: 2021-11-29

## 2021-11-29 VITALS — HEIGHT: 68 IN | WEIGHT: 160 LBS | BODY MASS INDEX: 24.25 KG/M2

## 2021-11-29 DIAGNOSIS — S80.02XA CONTUSION OF LEFT KNEE, INITIAL ENCOUNTER: ICD-10-CM

## 2021-11-29 DIAGNOSIS — M25.562 ACUTE PAIN OF LEFT KNEE: Primary | ICD-10-CM

## 2021-11-29 PROCEDURE — 99203 OFFICE O/P NEW LOW 30 MIN: CPT | Performed by: ORTHOPAEDIC SURGERY

## 2021-12-02 ENCOUNTER — ANTICOAGULATION VISIT (OUTPATIENT)
Dept: PHARMACY | Facility: HOSPITAL | Age: 71
End: 2021-12-02

## 2021-12-02 DIAGNOSIS — Z95.2 HX OF MITRAL VALVE REPLACEMENT WITH MECHANICAL VALVE: Primary | ICD-10-CM

## 2021-12-02 LAB — INR PPP: 4.6

## 2021-12-06 ENCOUNTER — TELEPHONE (OUTPATIENT)
Dept: PHYSICAL THERAPY | Facility: CLINIC | Age: 71
End: 2021-12-06

## 2021-12-09 ENCOUNTER — HOSPITAL ENCOUNTER (OUTPATIENT)
Dept: CT IMAGING | Facility: HOSPITAL | Age: 71
Discharge: HOME | End: 2021-12-09
Admitting: NURSE PRACTITIONER

## 2021-12-09 DIAGNOSIS — Z87.891 PERSONAL HISTORY OF TOBACCO USE, PRESENTING HAZARDS TO HEALTH: ICD-10-CM

## 2021-12-09 PROCEDURE — 71271 CT THORAX LUNG CANCER SCR C-: CPT

## 2021-12-13 ENCOUNTER — ANTICOAGULATION VISIT (OUTPATIENT)
Dept: PHARMACY | Facility: HOSPITAL | Age: 71
End: 2021-12-13

## 2021-12-13 DIAGNOSIS — Z95.2 HX OF MITRAL VALVE REPLACEMENT WITH MECHANICAL VALVE: Primary | ICD-10-CM

## 2021-12-13 LAB — INR PPP: 3.8

## 2021-12-21 ENCOUNTER — ANTICOAGULATION VISIT (OUTPATIENT)
Dept: PHARMACY | Facility: HOSPITAL | Age: 71
End: 2021-12-21

## 2021-12-21 DIAGNOSIS — Z95.2 HX OF MITRAL VALVE REPLACEMENT WITH MECHANICAL VALVE: Primary | ICD-10-CM

## 2021-12-21 LAB — INR PPP: 4.6

## 2021-12-30 RX ORDER — TIOTROPIUM BROMIDE AND OLODATEROL 3.124; 2.736 UG/1; UG/1
2 SPRAY, METERED RESPIRATORY (INHALATION) DAILY
Qty: 4 G | Refills: 0 | Status: SHIPPED | OUTPATIENT
Start: 2021-12-30 | End: 2022-01-13 | Stop reason: SDUPTHER

## 2022-01-12 ENCOUNTER — TELEPHONE (OUTPATIENT)
Dept: PHARMACY | Facility: HOSPITAL | Age: 72
End: 2022-01-12

## 2022-01-13 ENCOUNTER — ANTICOAGULATION VISIT (OUTPATIENT)
Dept: PHARMACY | Facility: HOSPITAL | Age: 72
End: 2022-01-13

## 2022-01-13 DIAGNOSIS — Z95.2 HX OF MITRAL VALVE REPLACEMENT WITH MECHANICAL VALVE: Primary | ICD-10-CM

## 2022-01-13 LAB — INR PPP: 3.1 (ref 3–4)

## 2022-01-13 RX ORDER — TIOTROPIUM BROMIDE AND OLODATEROL 3.124; 2.736 UG/1; UG/1
2 SPRAY, METERED RESPIRATORY (INHALATION) DAILY
Qty: 4 G | Refills: 0 | Status: SHIPPED | OUTPATIENT
Start: 2022-01-13 | End: 2022-01-19 | Stop reason: SDUPTHER

## 2022-01-19 RX ORDER — ALBUTEROL SULFATE 90 UG/1
2 AEROSOL, METERED RESPIRATORY (INHALATION) EVERY 4 HOURS PRN
Qty: 18 G | Refills: 5 | Status: SHIPPED | OUTPATIENT
Start: 2022-01-19 | End: 2022-03-24 | Stop reason: SDUPTHER

## 2022-01-19 RX ORDER — TIOTROPIUM BROMIDE INHALATION SPRAY 3.12 UG/1
2 SPRAY, METERED RESPIRATORY (INHALATION) DAILY
Qty: 4 G | Refills: 5 | Status: SHIPPED | OUTPATIENT
Start: 2022-01-19 | End: 2022-03-24

## 2022-01-19 RX ORDER — TIOTROPIUM BROMIDE AND OLODATEROL 3.124; 2.736 UG/1; UG/1
2 SPRAY, METERED RESPIRATORY (INHALATION) DAILY
Qty: 4 G | Refills: 0 | Status: SHIPPED | OUTPATIENT
Start: 2022-01-19 | End: 2022-03-24 | Stop reason: HOSPADM

## 2022-02-01 ENCOUNTER — TELEPHONE (OUTPATIENT)
Dept: PHARMACY | Facility: HOSPITAL | Age: 72
End: 2022-02-01

## 2022-02-08 ENCOUNTER — ANTICOAGULATION VISIT (OUTPATIENT)
Dept: PHARMACY | Facility: HOSPITAL | Age: 72
End: 2022-02-08

## 2022-02-08 DIAGNOSIS — Z95.2 HX OF MITRAL VALVE REPLACEMENT WITH MECHANICAL VALVE: Primary | ICD-10-CM

## 2022-02-08 LAB — INR PPP: 4.9

## 2022-02-08 RX ORDER — WARFARIN SODIUM 2 MG/1
TABLET ORAL
Qty: 30 TABLET | Refills: 0 | Status: SHIPPED | OUTPATIENT
Start: 2022-02-08 | End: 2022-02-08 | Stop reason: SDUPTHER

## 2022-02-08 RX ORDER — WARFARIN SODIUM 2 MG/1
TABLET ORAL
Qty: 30 TABLET | Refills: 0 | Status: SHIPPED | OUTPATIENT
Start: 2022-02-08 | End: 2022-03-09 | Stop reason: SDUPTHER

## 2022-02-15 ENCOUNTER — ANTICOAGULATION VISIT (OUTPATIENT)
Dept: PHARMACY | Facility: HOSPITAL | Age: 72
End: 2022-02-15

## 2022-02-15 DIAGNOSIS — Z95.2 HX OF MITRAL VALVE REPLACEMENT WITH MECHANICAL VALVE: Primary | ICD-10-CM

## 2022-02-15 LAB — INR PPP: 6.7

## 2022-02-24 ENCOUNTER — ANTICOAGULATION VISIT (OUTPATIENT)
Dept: PHARMACY | Facility: HOSPITAL | Age: 72
End: 2022-02-24

## 2022-02-24 DIAGNOSIS — Z95.2 HX OF MITRAL VALVE REPLACEMENT WITH MECHANICAL VALVE: Primary | ICD-10-CM

## 2022-02-24 LAB — INR PPP: 2.8

## 2022-02-28 ENCOUNTER — ANTICOAGULATION VISIT (OUTPATIENT)
Dept: PHARMACY | Facility: HOSPITAL | Age: 72
End: 2022-02-28

## 2022-02-28 DIAGNOSIS — Z95.2 HX OF MITRAL VALVE REPLACEMENT WITH MECHANICAL VALVE: Primary | ICD-10-CM

## 2022-02-28 LAB — INR PPP: 2.5

## 2022-03-03 ENCOUNTER — ANTICOAGULATION VISIT (OUTPATIENT)
Dept: PHARMACY | Facility: HOSPITAL | Age: 72
End: 2022-03-03

## 2022-03-03 DIAGNOSIS — Z95.2 HX OF MITRAL VALVE REPLACEMENT WITH MECHANICAL VALVE: Primary | ICD-10-CM

## 2022-03-03 LAB — INR PPP: 4.2

## 2022-03-08 ENCOUNTER — ANTICOAGULATION VISIT (OUTPATIENT)
Dept: PHARMACY | Facility: HOSPITAL | Age: 72
End: 2022-03-08

## 2022-03-08 DIAGNOSIS — Z95.2 HX OF MITRAL VALVE REPLACEMENT WITH MECHANICAL VALVE: Primary | ICD-10-CM

## 2022-03-08 LAB — INR PPP: 4.2

## 2022-03-09 RX ORDER — WARFARIN SODIUM 2 MG/1
TABLET ORAL
Qty: 45 TABLET | Refills: 0 | Status: SHIPPED | OUTPATIENT
Start: 2022-03-09 | End: 2022-04-01 | Stop reason: SDUPTHER

## 2022-03-15 ENCOUNTER — ANTICOAGULATION VISIT (OUTPATIENT)
Dept: PHARMACY | Facility: HOSPITAL | Age: 72
End: 2022-03-15

## 2022-03-15 DIAGNOSIS — Z95.2 HX OF MITRAL VALVE REPLACEMENT WITH MECHANICAL VALVE: Primary | ICD-10-CM

## 2022-03-15 LAB — INR PPP: 3.7

## 2022-03-22 ENCOUNTER — ANTICOAGULATION VISIT (OUTPATIENT)
Dept: PHARMACY | Facility: HOSPITAL | Age: 72
End: 2022-03-22

## 2022-03-22 DIAGNOSIS — Z95.2 HX OF MITRAL VALVE REPLACEMENT WITH MECHANICAL VALVE: Primary | ICD-10-CM

## 2022-03-22 LAB — INR PPP: 2.9

## 2022-03-24 ENCOUNTER — OFFICE VISIT (OUTPATIENT)
Dept: CARDIOLOGY | Facility: CLINIC | Age: 72
End: 2022-03-24

## 2022-03-24 ENCOUNTER — OFFICE VISIT (OUTPATIENT)
Dept: PULMONOLOGY | Facility: CLINIC | Age: 72
End: 2022-03-24

## 2022-03-24 VITALS
RESPIRATION RATE: 18 BRPM | HEART RATE: 82 BPM | SYSTOLIC BLOOD PRESSURE: 128 MMHG | OXYGEN SATURATION: 98 % | HEIGHT: 68 IN | DIASTOLIC BLOOD PRESSURE: 86 MMHG | BODY MASS INDEX: 23.34 KG/M2 | WEIGHT: 154 LBS

## 2022-03-24 VITALS
DIASTOLIC BLOOD PRESSURE: 78 MMHG | OXYGEN SATURATION: 96 % | BODY MASS INDEX: 23.64 KG/M2 | WEIGHT: 156 LBS | SYSTOLIC BLOOD PRESSURE: 142 MMHG | RESPIRATION RATE: 18 BRPM | HEART RATE: 81 BPM | HEIGHT: 68 IN

## 2022-03-24 DIAGNOSIS — G47.34 NOCTURNAL HYPOXIA: ICD-10-CM

## 2022-03-24 DIAGNOSIS — I10 ESSENTIAL HYPERTENSION: ICD-10-CM

## 2022-03-24 DIAGNOSIS — Z98.890 HX OF MITRAL VALVE REPAIR: ICD-10-CM

## 2022-03-24 DIAGNOSIS — J44.9 CHRONIC OBSTRUCTIVE PULMONARY DISEASE, UNSPECIFIED COPD TYPE: Primary | ICD-10-CM

## 2022-03-24 DIAGNOSIS — I25.10 CORONARY ARTERY DISEASE INVOLVING NATIVE CORONARY ARTERY OF NATIVE HEART WITHOUT ANGINA PECTORIS: Primary | Chronic | ICD-10-CM

## 2022-03-24 DIAGNOSIS — R09.02 EXERCISE HYPOXEMIA: ICD-10-CM

## 2022-03-24 DIAGNOSIS — Z87.891 PERSONAL HISTORY OF TOBACCO USE, PRESENTING HAZARDS TO HEALTH: ICD-10-CM

## 2022-03-24 PROCEDURE — 99214 OFFICE O/P EST MOD 30 MIN: CPT | Performed by: INTERNAL MEDICINE

## 2022-03-24 PROCEDURE — 99213 OFFICE O/P EST LOW 20 MIN: CPT | Performed by: NURSE PRACTITIONER

## 2022-03-24 RX ORDER — TIOTROPIUM BROMIDE AND OLODATEROL 3.124; 2.736 UG/1; UG/1
2 SPRAY, METERED RESPIRATORY (INHALATION) DAILY
Qty: 4 G | Refills: 11 | Status: SHIPPED | OUTPATIENT
Start: 2022-03-24 | End: 2022-09-26 | Stop reason: SDUPTHER

## 2022-03-24 RX ORDER — ALBUTEROL SULFATE 90 UG/1
2 AEROSOL, METERED RESPIRATORY (INHALATION) EVERY 4 HOURS PRN
Qty: 18 G | Refills: 5 | Status: SHIPPED | OUTPATIENT
Start: 2022-03-24 | End: 2022-09-26 | Stop reason: SDUPTHER

## 2022-04-01 ENCOUNTER — ANTICOAGULATION VISIT (OUTPATIENT)
Dept: PHARMACY | Facility: HOSPITAL | Age: 72
End: 2022-04-01

## 2022-04-01 DIAGNOSIS — Z95.2 HX OF MITRAL VALVE REPLACEMENT WITH MECHANICAL VALVE: Primary | ICD-10-CM

## 2022-04-01 LAB — INR PPP: 3.8

## 2022-04-01 RX ORDER — WARFARIN SODIUM 2 MG/1
TABLET ORAL
Qty: 110 TABLET | Refills: 1 | Status: SHIPPED | OUTPATIENT
Start: 2022-04-01 | End: 2022-10-05

## 2022-04-07 ENCOUNTER — ANTICOAGULATION VISIT (OUTPATIENT)
Dept: PHARMACY | Facility: HOSPITAL | Age: 72
End: 2022-04-07

## 2022-04-07 ENCOUNTER — TELEPHONE (OUTPATIENT)
Dept: INTERNAL MEDICINE | Facility: CLINIC | Age: 72
End: 2022-04-07

## 2022-04-07 DIAGNOSIS — Z95.2 HX OF MITRAL VALVE REPLACEMENT WITH MECHANICAL VALVE: Primary | ICD-10-CM

## 2022-04-07 LAB — INR PPP: 3.2

## 2022-04-15 ENCOUNTER — DOCUMENTATION (OUTPATIENT)
Dept: PULMONOLOGY | Facility: CLINIC | Age: 72
End: 2022-04-15

## 2022-04-15 ENCOUNTER — OFFICE VISIT (OUTPATIENT)
Dept: INTERNAL MEDICINE | Facility: CLINIC | Age: 72
End: 2022-04-15

## 2022-04-15 VITALS
TEMPERATURE: 97.1 F | OXYGEN SATURATION: 94 % | HEIGHT: 68 IN | WEIGHT: 159 LBS | BODY MASS INDEX: 24.1 KG/M2 | HEART RATE: 68 BPM | SYSTOLIC BLOOD PRESSURE: 138 MMHG | DIASTOLIC BLOOD PRESSURE: 80 MMHG

## 2022-04-15 DIAGNOSIS — I25.10 CORONARY ARTERY DISEASE INVOLVING NATIVE CORONARY ARTERY OF NATIVE HEART WITHOUT ANGINA PECTORIS: Primary | ICD-10-CM

## 2022-04-15 DIAGNOSIS — F41.9 ANXIETY: ICD-10-CM

## 2022-04-15 DIAGNOSIS — R35.0 FREQUENCY OF URINATION: ICD-10-CM

## 2022-04-15 DIAGNOSIS — F32.A DEPRESSION, UNSPECIFIED DEPRESSION TYPE: ICD-10-CM

## 2022-04-15 DIAGNOSIS — R06.02 SHORTNESS OF BREATH: Primary | ICD-10-CM

## 2022-04-15 LAB
ALBUMIN SERPL-MCNC: 4.5 G/DL (ref 3.5–5.2)
ALBUMIN/GLOB SERPL: 2 G/DL
ALP SERPL-CCNC: 115 U/L (ref 39–117)
ALT SERPL-CCNC: 10 U/L (ref 1–33)
AST SERPL-CCNC: 15 U/L (ref 1–32)
BASOPHILS # BLD AUTO: 0.05 10*3/MM3 (ref 0–0.2)
BASOPHILS NFR BLD AUTO: 0.7 % (ref 0–1.5)
BILIRUB SERPL-MCNC: 0.8 MG/DL (ref 0–1.2)
BUN SERPL-MCNC: 13 MG/DL (ref 8–23)
BUN/CREAT SERPL: 16.3 (ref 7–25)
CALCIUM SERPL-MCNC: 9.3 MG/DL (ref 8.6–10.5)
CHLORIDE SERPL-SCNC: 103 MMOL/L (ref 98–107)
CHOLEST SERPL-MCNC: 152 MG/DL (ref 0–200)
CO2 SERPL-SCNC: 28.5 MMOL/L (ref 22–29)
CREAT SERPL-MCNC: 0.8 MG/DL (ref 0.57–1)
EGFRCR SERPLBLD CKD-EPI 2021: 77.9 ML/MIN/1.73
EOSINOPHIL # BLD AUTO: 0.17 10*3/MM3 (ref 0–0.4)
EOSINOPHIL NFR BLD AUTO: 2.4 % (ref 0.3–6.2)
ERYTHROCYTE [DISTWIDTH] IN BLOOD BY AUTOMATED COUNT: 13.6 % (ref 12.3–15.4)
GLOBULIN SER CALC-MCNC: 2.2 GM/DL
GLUCOSE SERPL-MCNC: 107 MG/DL (ref 65–99)
HCT VFR BLD AUTO: 39.8 % (ref 34–46.6)
HDLC SERPL-MCNC: 59 MG/DL (ref 40–60)
HGB BLD-MCNC: 12.7 G/DL (ref 12–15.9)
IMM GRANULOCYTES # BLD AUTO: 0.03 10*3/MM3 (ref 0–0.05)
IMM GRANULOCYTES NFR BLD AUTO: 0.4 % (ref 0–0.5)
LDLC SERPL CALC-MCNC: 76 MG/DL (ref 0–100)
LYMPHOCYTES # BLD AUTO: 1.03 10*3/MM3 (ref 0.7–3.1)
LYMPHOCYTES NFR BLD AUTO: 14.3 % (ref 19.6–45.3)
MCH RBC QN AUTO: 27.1 PG (ref 26.6–33)
MCHC RBC AUTO-ENTMCNC: 31.9 G/DL (ref 31.5–35.7)
MCV RBC AUTO: 85 FL (ref 79–97)
MONOCYTES # BLD AUTO: 0.67 10*3/MM3 (ref 0.1–0.9)
MONOCYTES NFR BLD AUTO: 9.3 % (ref 5–12)
NEUTROPHILS # BLD AUTO: 5.25 10*3/MM3 (ref 1.7–7)
NEUTROPHILS NFR BLD AUTO: 72.9 % (ref 42.7–76)
NRBC BLD AUTO-RTO: 0 /100 WBC (ref 0–0.2)
PLATELET # BLD AUTO: 198 10*3/MM3 (ref 140–450)
POTASSIUM SERPL-SCNC: 3.8 MMOL/L (ref 3.5–5.2)
PROT SERPL-MCNC: 6.7 G/DL (ref 6–8.5)
RBC # BLD AUTO: 4.68 10*6/MM3 (ref 3.77–5.28)
SODIUM SERPL-SCNC: 143 MMOL/L (ref 136–145)
TRIGL SERPL-MCNC: 93 MG/DL (ref 0–150)
VLDLC SERPL CALC-MCNC: 17 MG/DL (ref 5–40)
WBC # BLD AUTO: 7.2 10*3/MM3 (ref 3.4–10.8)

## 2022-04-15 PROCEDURE — 99214 OFFICE O/P EST MOD 30 MIN: CPT | Performed by: FAMILY MEDICINE

## 2022-04-15 RX ORDER — PAROXETINE 30 MG/1
30 TABLET, FILM COATED ORAL EVERY MORNING
Qty: 90 TABLET | Refills: 3 | Status: SHIPPED | OUTPATIENT
Start: 2022-04-15

## 2022-04-15 RX ORDER — PRAVASTATIN SODIUM 80 MG/1
80 TABLET ORAL NIGHTLY
Qty: 90 TABLET | Refills: 3 | Status: SHIPPED | OUTPATIENT
Start: 2022-04-15 | End: 2022-05-05

## 2022-05-01 PROBLEM — R35.0 FREQUENCY OF URINATION: Status: ACTIVE | Noted: 2022-05-01

## 2022-05-02 RX ORDER — PRAVASTATIN SODIUM 80 MG/1
TABLET ORAL
Qty: 90 TABLET | Refills: 0 | OUTPATIENT
Start: 2022-05-02

## 2022-05-05 RX ORDER — PRAVASTATIN SODIUM 80 MG/1
TABLET ORAL
Qty: 90 TABLET | Refills: 0 | Status: SHIPPED | OUTPATIENT
Start: 2022-05-05 | End: 2022-09-15 | Stop reason: SDUPTHER

## 2022-05-19 ENCOUNTER — TELEPHONE (OUTPATIENT)
Dept: PHARMACY | Facility: HOSPITAL | Age: 72
End: 2022-05-19

## 2022-05-19 ENCOUNTER — APPOINTMENT (OUTPATIENT)
Dept: PHARMACY | Facility: HOSPITAL | Age: 72
End: 2022-05-19

## 2022-05-19 DIAGNOSIS — Z95.2 HX OF MITRAL VALVE REPLACEMENT WITH MECHANICAL VALVE: Primary | ICD-10-CM

## 2022-05-20 ENCOUNTER — ANTICOAGULATION VISIT (OUTPATIENT)
Dept: PHARMACY | Facility: HOSPITAL | Age: 72
End: 2022-05-20

## 2022-05-20 DIAGNOSIS — Z95.2 HX OF MITRAL VALVE REPLACEMENT WITH MECHANICAL VALVE: Primary | ICD-10-CM

## 2022-05-20 LAB — INR PPP: 5.2

## 2022-06-01 ENCOUNTER — TELEPHONE (OUTPATIENT)
Dept: PHARMACY | Facility: HOSPITAL | Age: 72
End: 2022-06-01

## 2022-06-02 ENCOUNTER — ANTICOAGULATION VISIT (OUTPATIENT)
Dept: PHARMACY | Facility: HOSPITAL | Age: 72
End: 2022-06-02

## 2022-06-02 DIAGNOSIS — Z95.2 HX OF MITRAL VALVE REPLACEMENT WITH MECHANICAL VALVE: Primary | ICD-10-CM

## 2022-06-02 LAB — INR PPP: 3.8

## 2022-06-17 ENCOUNTER — TELEPHONE (OUTPATIENT)
Dept: PHARMACY | Facility: HOSPITAL | Age: 72
End: 2022-06-17

## 2022-06-22 ENCOUNTER — TELEPHONE (OUTPATIENT)
Dept: PHARMACY | Facility: HOSPITAL | Age: 72
End: 2022-06-22

## 2022-06-27 ENCOUNTER — ANTICOAGULATION VISIT (OUTPATIENT)
Dept: PHARMACY | Facility: HOSPITAL | Age: 72
End: 2022-06-27

## 2022-06-27 DIAGNOSIS — Z95.2 HX OF MITRAL VALVE REPLACEMENT WITH MECHANICAL VALVE: Primary | ICD-10-CM

## 2022-06-27 LAB — INR PPP: 2.9

## 2022-07-13 ENCOUNTER — ANTICOAGULATION VISIT (OUTPATIENT)
Dept: PHARMACY | Facility: HOSPITAL | Age: 72
End: 2022-07-13

## 2022-07-13 DIAGNOSIS — Z95.2 HX OF MITRAL VALVE REPLACEMENT WITH MECHANICAL VALVE: Primary | ICD-10-CM

## 2022-07-13 LAB — INR PPP: 3.4

## 2022-08-02 ENCOUNTER — ANTICOAGULATION VISIT (OUTPATIENT)
Dept: PHARMACY | Facility: HOSPITAL | Age: 72
End: 2022-08-02

## 2022-08-02 DIAGNOSIS — Z95.2 HX OF MITRAL VALVE REPLACEMENT WITH MECHANICAL VALVE: Primary | ICD-10-CM

## 2022-08-02 LAB — INR PPP: 3.5

## 2022-08-23 ENCOUNTER — TELEPHONE (OUTPATIENT)
Dept: PHARMACY | Facility: HOSPITAL | Age: 72
End: 2022-08-23

## 2022-09-02 ENCOUNTER — ANTICOAGULATION VISIT (OUTPATIENT)
Dept: PHARMACY | Facility: HOSPITAL | Age: 72
End: 2022-09-02

## 2022-09-02 DIAGNOSIS — Z95.2 HX OF MITRAL VALVE REPLACEMENT WITH MECHANICAL VALVE: Primary | ICD-10-CM

## 2022-09-02 LAB — INR PPP: 3.1 (ref 3–4)

## 2022-09-15 ENCOUNTER — OFFICE VISIT (OUTPATIENT)
Dept: INTERNAL MEDICINE | Facility: CLINIC | Age: 72
End: 2022-09-15

## 2022-09-15 VITALS
OXYGEN SATURATION: 94 % | DIASTOLIC BLOOD PRESSURE: 82 MMHG | HEIGHT: 68 IN | HEART RATE: 62 BPM | BODY MASS INDEX: 23.19 KG/M2 | SYSTOLIC BLOOD PRESSURE: 120 MMHG | TEMPERATURE: 97 F | WEIGHT: 153 LBS

## 2022-09-15 DIAGNOSIS — Z00.00 MEDICARE ANNUAL WELLNESS VISIT, SUBSEQUENT: Primary | ICD-10-CM

## 2022-09-15 PROCEDURE — 1170F FXNL STATUS ASSESSED: CPT | Performed by: FAMILY MEDICINE

## 2022-09-15 PROCEDURE — 90677 PCV20 VACCINE IM: CPT | Performed by: FAMILY MEDICINE

## 2022-09-15 PROCEDURE — G0439 PPPS, SUBSEQ VISIT: HCPCS | Performed by: FAMILY MEDICINE

## 2022-09-15 PROCEDURE — 1159F MED LIST DOCD IN RCRD: CPT | Performed by: FAMILY MEDICINE

## 2022-09-15 PROCEDURE — G0009 ADMIN PNEUMOCOCCAL VACCINE: HCPCS | Performed by: FAMILY MEDICINE

## 2022-09-15 RX ORDER — PRAVASTATIN SODIUM 80 MG/1
80 TABLET ORAL NIGHTLY
Qty: 90 TABLET | Refills: 3 | Status: SHIPPED | OUTPATIENT
Start: 2022-09-15

## 2022-09-26 ENCOUNTER — OFFICE VISIT (OUTPATIENT)
Dept: PULMONOLOGY | Facility: CLINIC | Age: 72
End: 2022-09-26

## 2022-09-26 VITALS
DIASTOLIC BLOOD PRESSURE: 82 MMHG | WEIGHT: 157 LBS | RESPIRATION RATE: 18 BRPM | HEART RATE: 71 BPM | OXYGEN SATURATION: 88 % | HEIGHT: 68 IN | BODY MASS INDEX: 23.79 KG/M2 | SYSTOLIC BLOOD PRESSURE: 141 MMHG

## 2022-09-26 DIAGNOSIS — J44.9 CHRONIC OBSTRUCTIVE PULMONARY DISEASE, UNSPECIFIED COPD TYPE: Primary | ICD-10-CM

## 2022-09-26 DIAGNOSIS — J96.11 CHRONIC RESPIRATORY FAILURE WITH HYPOXIA: ICD-10-CM

## 2022-09-26 DIAGNOSIS — G47.34 NOCTURNAL HYPOXIA: ICD-10-CM

## 2022-09-26 DIAGNOSIS — J44.9 CHRONIC OBSTRUCTIVE PULMONARY DISEASE, UNSPECIFIED COPD TYPE: ICD-10-CM

## 2022-09-26 DIAGNOSIS — Z87.891 PERSONAL HISTORY OF TOBACCO USE, PRESENTING HAZARDS TO HEALTH: ICD-10-CM

## 2022-09-26 PROCEDURE — 94729 DIFFUSING CAPACITY: CPT | Performed by: INTERNAL MEDICINE

## 2022-09-26 PROCEDURE — 94060 EVALUATION OF WHEEZING: CPT | Performed by: INTERNAL MEDICINE

## 2022-09-26 PROCEDURE — 94726 PLETHYSMOGRAPHY LUNG VOLUMES: CPT | Performed by: INTERNAL MEDICINE

## 2022-09-26 PROCEDURE — 99214 OFFICE O/P EST MOD 30 MIN: CPT | Performed by: NURSE PRACTITIONER

## 2022-09-26 RX ORDER — TIOTROPIUM BROMIDE AND OLODATEROL 3.124; 2.736 UG/1; UG/1
2 SPRAY, METERED RESPIRATORY (INHALATION) DAILY
Qty: 4 G | Refills: 11 | Status: SHIPPED | OUTPATIENT
Start: 2022-09-26

## 2022-09-26 RX ORDER — ALBUTEROL SULFATE 90 UG/1
2 AEROSOL, METERED RESPIRATORY (INHALATION) EVERY 4 HOURS PRN
Qty: 18 G | Refills: 5 | Status: SHIPPED | OUTPATIENT
Start: 2022-09-26

## 2022-10-03 ENCOUNTER — OFFICE VISIT (OUTPATIENT)
Dept: CARDIOLOGY | Facility: CLINIC | Age: 72
End: 2022-10-03

## 2022-10-03 VITALS
SYSTOLIC BLOOD PRESSURE: 120 MMHG | DIASTOLIC BLOOD PRESSURE: 70 MMHG | HEART RATE: 53 BPM | BODY MASS INDEX: 23.49 KG/M2 | WEIGHT: 155 LBS | OXYGEN SATURATION: 93 % | HEIGHT: 68 IN

## 2022-10-03 DIAGNOSIS — J44.9 COPD WITH HYPOXIA: Chronic | ICD-10-CM

## 2022-10-03 DIAGNOSIS — I10 ESSENTIAL HYPERTENSION: Primary | ICD-10-CM

## 2022-10-03 DIAGNOSIS — R09.02 COPD WITH HYPOXIA: Chronic | ICD-10-CM

## 2022-10-03 DIAGNOSIS — Z95.2 HISTORY OF MITRAL VALVE PROSTHESIS: ICD-10-CM

## 2022-10-03 PROCEDURE — 99213 OFFICE O/P EST LOW 20 MIN: CPT | Performed by: INTERNAL MEDICINE

## 2022-10-04 ENCOUNTER — ANTICOAGULATION VISIT (OUTPATIENT)
Dept: PHARMACY | Facility: HOSPITAL | Age: 72
End: 2022-10-04

## 2022-10-04 DIAGNOSIS — Z95.2 HX OF MITRAL VALVE REPLACEMENT WITH MECHANICAL VALVE: Primary | ICD-10-CM

## 2022-10-04 LAB — INR PPP: 4.2

## 2022-10-05 RX ORDER — WARFARIN SODIUM 2 MG/1
TABLET ORAL
Qty: 110 TABLET | Refills: 0 | Status: SHIPPED | OUTPATIENT
Start: 2022-10-05 | End: 2022-12-28 | Stop reason: SDUPTHER

## 2022-10-28 ENCOUNTER — ANTICOAGULATION VISIT (OUTPATIENT)
Dept: PHARMACY | Facility: HOSPITAL | Age: 72
End: 2022-10-28

## 2022-10-28 DIAGNOSIS — Z95.2 HX OF MITRAL VALVE REPLACEMENT WITH MECHANICAL VALVE: Primary | ICD-10-CM

## 2022-10-28 LAB — INR PPP: 2.4

## 2022-11-04 ENCOUNTER — ANTICOAGULATION VISIT (OUTPATIENT)
Dept: PHARMACY | Facility: HOSPITAL | Age: 72
End: 2022-11-04

## 2022-11-04 DIAGNOSIS — Z95.2 HX OF MITRAL VALVE REPLACEMENT WITH MECHANICAL VALVE: Primary | ICD-10-CM

## 2022-11-04 LAB — INR PPP: 3.5

## 2022-12-02 ENCOUNTER — APPOINTMENT (OUTPATIENT)
Dept: CT IMAGING | Facility: HOSPITAL | Age: 72
End: 2022-12-02

## 2022-12-07 ENCOUNTER — TELEPHONE (OUTPATIENT)
Dept: PHARMACY | Facility: HOSPITAL | Age: 72
End: 2022-12-07

## 2022-12-09 ENCOUNTER — ANTICOAGULATION VISIT (OUTPATIENT)
Dept: PHARMACY | Facility: HOSPITAL | Age: 72
End: 2022-12-09

## 2022-12-09 DIAGNOSIS — Z95.2 HX OF MITRAL VALVE REPLACEMENT WITH MECHANICAL VALVE: Primary | ICD-10-CM

## 2022-12-09 LAB — INR PPP: 3.1

## 2022-12-28 ENCOUNTER — ANTICOAGULATION VISIT (OUTPATIENT)
Dept: PHARMACY | Facility: HOSPITAL | Age: 72
End: 2022-12-28

## 2022-12-28 DIAGNOSIS — Z95.2 HX OF MITRAL VALVE REPLACEMENT WITH MECHANICAL VALVE: Primary | ICD-10-CM

## 2022-12-28 LAB — INR PPP: 5

## 2022-12-29 RX ORDER — WARFARIN SODIUM 2 MG/1
TABLET ORAL
Qty: 135 TABLET | Refills: 0 | Status: SHIPPED | OUTPATIENT
Start: 2022-12-29

## 2023-01-03 ENCOUNTER — TELEPHONE (OUTPATIENT)
Dept: PULMONOLOGY | Facility: CLINIC | Age: 73
End: 2023-01-03
Payer: MEDICARE

## 2023-01-31 ENCOUNTER — TELEPHONE (OUTPATIENT)
Dept: PHARMACY | Facility: HOSPITAL | Age: 73
End: 2023-01-31

## 2023-03-17 ENCOUNTER — ANTICOAGULATION VISIT (OUTPATIENT)
Dept: PHARMACY | Facility: HOSPITAL | Age: 73
End: 2023-03-17
Payer: MEDICARE

## 2023-03-17 DIAGNOSIS — Z95.2 HX OF MITRAL VALVE REPLACEMENT WITH MECHANICAL VALVE: Primary | ICD-10-CM

## 2023-03-17 LAB — INR PPP: 3.2

## 2023-03-27 ENCOUNTER — ANTICOAGULATION VISIT (OUTPATIENT)
Dept: PHARMACY | Facility: HOSPITAL | Age: 73
End: 2023-03-27
Payer: MEDICARE

## 2023-03-27 DIAGNOSIS — Z95.2 HX OF MITRAL VALVE REPLACEMENT WITH MECHANICAL VALVE: Primary | ICD-10-CM

## 2023-03-27 LAB — INR PPP: 3.4

## 2023-04-12 ENCOUNTER — ANTICOAGULATION VISIT (OUTPATIENT)
Dept: PHARMACY | Facility: HOSPITAL | Age: 73
End: 2023-04-12
Payer: MEDICARE

## 2023-04-12 DIAGNOSIS — Z95.2 HX OF MITRAL VALVE REPLACEMENT WITH MECHANICAL VALVE: Primary | ICD-10-CM

## 2023-04-12 LAB — INR PPP: 3.2

## 2023-04-19 ENCOUNTER — ANTICOAGULATION VISIT (OUTPATIENT)
Dept: PHARMACY | Facility: HOSPITAL | Age: 73
End: 2023-04-19
Payer: MEDICARE

## 2023-04-19 DIAGNOSIS — Z95.2 HX OF MITRAL VALVE REPLACEMENT WITH MECHANICAL VALVE: Primary | ICD-10-CM

## 2023-04-19 LAB
INR PPP: 2.7
INR PPP: 3

## 2023-04-20 RX ORDER — WARFARIN SODIUM 2 MG/1
TABLET ORAL
Qty: 135 TABLET | Refills: 0 | Status: SHIPPED | OUTPATIENT
Start: 2023-04-20

## 2023-04-26 ENCOUNTER — OFFICE VISIT (OUTPATIENT)
Dept: PULMONOLOGY | Facility: CLINIC | Age: 73
End: 2023-04-26
Payer: MEDICARE

## 2023-04-26 VITALS
OXYGEN SATURATION: 93 % | BODY MASS INDEX: 23.34 KG/M2 | DIASTOLIC BLOOD PRESSURE: 70 MMHG | HEIGHT: 68 IN | HEART RATE: 61 BPM | RESPIRATION RATE: 18 BRPM | SYSTOLIC BLOOD PRESSURE: 125 MMHG | WEIGHT: 154 LBS

## 2023-04-26 DIAGNOSIS — R09.02 EXERCISE HYPOXEMIA: ICD-10-CM

## 2023-04-26 DIAGNOSIS — J44.9 CHRONIC OBSTRUCTIVE PULMONARY DISEASE, UNSPECIFIED COPD TYPE: Primary | ICD-10-CM

## 2023-04-26 DIAGNOSIS — G47.34 NOCTURNAL HYPOXIA: ICD-10-CM

## 2023-04-26 DIAGNOSIS — Z87.891 PERSONAL HISTORY OF NICOTINE DEPENDENCE: ICD-10-CM

## 2023-04-26 PROCEDURE — 3074F SYST BP LT 130 MM HG: CPT | Performed by: INTERNAL MEDICINE

## 2023-04-26 PROCEDURE — 3078F DIAST BP <80 MM HG: CPT | Performed by: INTERNAL MEDICINE

## 2023-04-26 PROCEDURE — 99214 OFFICE O/P EST MOD 30 MIN: CPT | Performed by: INTERNAL MEDICINE

## 2023-04-26 RX ORDER — TIOTROPIUM BROMIDE AND OLODATEROL 3.124; 2.736 UG/1; UG/1
2 SPRAY, METERED RESPIRATORY (INHALATION) DAILY
Qty: 4 G | Refills: 11 | Status: SHIPPED | OUTPATIENT
Start: 2023-04-26

## 2023-05-17 ENCOUNTER — ANTICOAGULATION VISIT (OUTPATIENT)
Dept: PHARMACY | Facility: HOSPITAL | Age: 73
End: 2023-05-17
Payer: MEDICARE

## 2023-05-17 DIAGNOSIS — Z95.2 HX OF MITRAL VALVE REPLACEMENT WITH MECHANICAL VALVE: Primary | ICD-10-CM

## 2023-05-17 LAB — INR PPP: 3.4

## 2023-06-02 ENCOUNTER — ANTICOAGULATION VISIT (OUTPATIENT)
Dept: PHARMACY | Facility: HOSPITAL | Age: 73
End: 2023-06-02

## 2023-06-02 DIAGNOSIS — Z95.2 HX OF MITRAL VALVE REPLACEMENT WITH MECHANICAL VALVE: Primary | ICD-10-CM

## 2023-06-02 LAB — INR PPP: 3

## 2023-06-05 ENCOUNTER — HOSPITAL ENCOUNTER (OUTPATIENT)
Dept: CT IMAGING | Facility: HOSPITAL | Age: 73
Discharge: HOME | End: 2023-06-05
Admitting: INTERNAL MEDICINE
Payer: MEDICARE

## 2023-06-05 DIAGNOSIS — Z87.891 PERSONAL HISTORY OF NICOTINE DEPENDENCE: ICD-10-CM

## 2023-06-05 PROCEDURE — 71271 CT THORAX LUNG CANCER SCR C-: CPT

## 2023-08-09 ENCOUNTER — ANTICOAGULATION VISIT (OUTPATIENT)
Dept: PHARMACY | Facility: HOSPITAL | Age: 73
End: 2023-08-09
Payer: MEDICARE

## 2023-08-09 DIAGNOSIS — Z95.2 HX OF MITRAL VALVE REPLACEMENT WITH MECHANICAL VALVE: Primary | ICD-10-CM

## 2023-08-09 LAB — INR PPP: 2.6

## 2023-08-17 ENCOUNTER — ANTICOAGULATION VISIT (OUTPATIENT)
Dept: PHARMACY | Facility: HOSPITAL | Age: 73
End: 2023-08-17
Payer: MEDICARE

## 2023-08-17 DIAGNOSIS — Z95.2 HX OF MITRAL VALVE REPLACEMENT WITH MECHANICAL VALVE: Primary | ICD-10-CM

## 2023-08-17 LAB — INR PPP: 3.9

## 2023-08-25 DIAGNOSIS — Z95.2 HX OF MITRAL VALVE REPLACEMENT WITH MECHANICAL VALVE: Primary | ICD-10-CM

## 2023-08-25 RX ORDER — WARFARIN SODIUM 2 MG/1
TABLET ORAL
Qty: 135 TABLET | Refills: 1 | Status: SHIPPED | OUTPATIENT
Start: 2023-08-25

## 2023-09-20 ENCOUNTER — OFFICE VISIT (OUTPATIENT)
Dept: INTERNAL MEDICINE | Facility: CLINIC | Age: 73
End: 2023-09-20
Payer: MEDICARE

## 2023-09-20 VITALS
BODY MASS INDEX: 22.88 KG/M2 | HEIGHT: 68 IN | WEIGHT: 151 LBS | TEMPERATURE: 98 F | HEART RATE: 74 BPM | SYSTOLIC BLOOD PRESSURE: 120 MMHG | OXYGEN SATURATION: 92 % | DIASTOLIC BLOOD PRESSURE: 80 MMHG

## 2023-09-20 DIAGNOSIS — K21.9 GASTROESOPHAGEAL REFLUX DISEASE, UNSPECIFIED WHETHER ESOPHAGITIS PRESENT: ICD-10-CM

## 2023-09-20 DIAGNOSIS — Z00.00 MEDICARE ANNUAL WELLNESS VISIT, SUBSEQUENT: Primary | ICD-10-CM

## 2023-09-20 DIAGNOSIS — F32.A DEPRESSION, UNSPECIFIED DEPRESSION TYPE: ICD-10-CM

## 2023-09-20 DIAGNOSIS — F41.9 ANXIETY: ICD-10-CM

## 2023-09-20 DIAGNOSIS — I25.10 CORONARY ARTERY DISEASE INVOLVING NATIVE CORONARY ARTERY OF NATIVE HEART WITHOUT ANGINA PECTORIS: ICD-10-CM

## 2023-09-20 LAB
ALBUMIN SERPL-MCNC: 4.5 G/DL (ref 3.5–5.2)
ALBUMIN/GLOB SERPL: 2 G/DL
ALP SERPL-CCNC: 102 U/L (ref 39–117)
ALT SERPL-CCNC: 7 U/L (ref 1–33)
AST SERPL-CCNC: 15 U/L (ref 1–32)
BASOPHILS # BLD AUTO: 0.06 10*3/MM3 (ref 0–0.2)
BASOPHILS NFR BLD AUTO: 0.7 % (ref 0–1.5)
BILIRUB SERPL-MCNC: 0.7 MG/DL (ref 0–1.2)
BUN SERPL-MCNC: 15 MG/DL (ref 8–23)
BUN/CREAT SERPL: 22.1 (ref 7–25)
CALCIUM SERPL-MCNC: 9.4 MG/DL (ref 8.6–10.5)
CHLORIDE SERPL-SCNC: 104 MMOL/L (ref 98–107)
CHOLEST SERPL-MCNC: 187 MG/DL (ref 0–200)
CO2 SERPL-SCNC: 29.2 MMOL/L (ref 22–29)
CREAT SERPL-MCNC: 0.68 MG/DL (ref 0.57–1)
EGFRCR SERPLBLD CKD-EPI 2021: 91.5 ML/MIN/1.73
EOSINOPHIL # BLD AUTO: 0.15 10*3/MM3 (ref 0–0.4)
EOSINOPHIL NFR BLD AUTO: 1.7 % (ref 0.3–6.2)
ERYTHROCYTE [DISTWIDTH] IN BLOOD BY AUTOMATED COUNT: 13.4 % (ref 12.3–15.4)
GLOBULIN SER CALC-MCNC: 2.2 GM/DL
GLUCOSE SERPL-MCNC: 91 MG/DL (ref 65–99)
HCT VFR BLD AUTO: 42.9 % (ref 34–46.6)
HDLC SERPL-MCNC: 62 MG/DL (ref 40–60)
HGB BLD-MCNC: 14 G/DL (ref 12–15.9)
IMM GRANULOCYTES # BLD AUTO: 0.03 10*3/MM3 (ref 0–0.05)
IMM GRANULOCYTES NFR BLD AUTO: 0.3 % (ref 0–0.5)
LDLC SERPL CALC-MCNC: 103 MG/DL (ref 0–100)
LYMPHOCYTES # BLD AUTO: 1.13 10*3/MM3 (ref 0.7–3.1)
LYMPHOCYTES NFR BLD AUTO: 13 % (ref 19.6–45.3)
MCH RBC QN AUTO: 28.1 PG (ref 26.6–33)
MCHC RBC AUTO-ENTMCNC: 32.6 G/DL (ref 31.5–35.7)
MCV RBC AUTO: 86.1 FL (ref 79–97)
MONOCYTES # BLD AUTO: 0.68 10*3/MM3 (ref 0.1–0.9)
MONOCYTES NFR BLD AUTO: 7.8 % (ref 5–12)
NEUTROPHILS # BLD AUTO: 6.62 10*3/MM3 (ref 1.7–7)
NEUTROPHILS NFR BLD AUTO: 76.5 % (ref 42.7–76)
NRBC BLD AUTO-RTO: 0 /100 WBC (ref 0–0.2)
PLATELET # BLD AUTO: 182 10*3/MM3 (ref 140–450)
POTASSIUM SERPL-SCNC: 4.6 MMOL/L (ref 3.5–5.2)
PROT SERPL-MCNC: 6.7 G/DL (ref 6–8.5)
RBC # BLD AUTO: 4.98 10*6/MM3 (ref 3.77–5.28)
SODIUM SERPL-SCNC: 142 MMOL/L (ref 136–145)
TRIGL SERPL-MCNC: 126 MG/DL (ref 0–150)
VLDLC SERPL CALC-MCNC: 22 MG/DL (ref 5–40)
WBC # BLD AUTO: 8.67 10*3/MM3 (ref 3.4–10.8)

## 2023-09-20 PROCEDURE — 1170F FXNL STATUS ASSESSED: CPT | Performed by: FAMILY MEDICINE

## 2023-09-20 PROCEDURE — 1159F MED LIST DOCD IN RCRD: CPT | Performed by: FAMILY MEDICINE

## 2023-09-20 PROCEDURE — G0439 PPPS, SUBSEQ VISIT: HCPCS | Performed by: FAMILY MEDICINE

## 2023-09-20 PROCEDURE — 3074F SYST BP LT 130 MM HG: CPT | Performed by: FAMILY MEDICINE

## 2023-09-20 PROCEDURE — 1160F RVW MEDS BY RX/DR IN RCRD: CPT | Performed by: FAMILY MEDICINE

## 2023-09-20 PROCEDURE — 3079F DIAST BP 80-89 MM HG: CPT | Performed by: FAMILY MEDICINE

## 2023-09-20 RX ORDER — PRAVASTATIN SODIUM 80 MG/1
80 TABLET ORAL NIGHTLY
Qty: 90 TABLET | Refills: 3 | Status: SHIPPED | OUTPATIENT
Start: 2023-09-20

## 2023-09-20 RX ORDER — FAMOTIDINE 20 MG/1
20 TABLET, FILM COATED ORAL 2 TIMES DAILY
Qty: 180 TABLET | Refills: 0 | Status: SHIPPED | OUTPATIENT
Start: 2023-09-20

## 2023-10-13 DIAGNOSIS — E78.5 HYPERLIPIDEMIA, UNSPECIFIED HYPERLIPIDEMIA TYPE: ICD-10-CM

## 2023-10-13 DIAGNOSIS — I25.10 CORONARY ARTERY DISEASE INVOLVING NATIVE CORONARY ARTERY OF NATIVE HEART WITHOUT ANGINA PECTORIS: Primary | ICD-10-CM

## 2023-10-13 RX ORDER — PRAVASTATIN SODIUM 80 MG/1
80 TABLET ORAL NIGHTLY
Qty: 90 TABLET | Refills: 3 | Status: SHIPPED | OUTPATIENT
Start: 2023-10-13

## 2023-10-23 ENCOUNTER — ANTICOAGULATION VISIT (OUTPATIENT)
Dept: PHARMACY | Facility: HOSPITAL | Age: 73
End: 2023-10-23
Payer: MEDICARE

## 2023-10-23 DIAGNOSIS — Z95.2 HX OF MITRAL VALVE REPLACEMENT WITH MECHANICAL VALVE: Primary | ICD-10-CM

## 2023-10-23 LAB — INR PPP: 3.2

## 2023-11-03 ENCOUNTER — ANTICOAGULATION VISIT (OUTPATIENT)
Dept: PHARMACY | Facility: HOSPITAL | Age: 73
End: 2023-11-03
Payer: MEDICARE

## 2023-11-03 DIAGNOSIS — Z95.2 HX OF MITRAL VALVE REPLACEMENT WITH MECHANICAL VALVE: Primary | ICD-10-CM

## 2023-11-03 LAB — INR PPP: 3.1

## 2023-11-10 ENCOUNTER — ANTICOAGULATION VISIT (OUTPATIENT)
Dept: PHARMACY | Facility: HOSPITAL | Age: 73
End: 2023-11-10
Payer: MEDICARE

## 2023-11-10 DIAGNOSIS — Z95.2 HX OF MITRAL VALVE REPLACEMENT WITH MECHANICAL VALVE: Primary | ICD-10-CM

## 2023-11-10 LAB — INR PPP: 2.9

## 2023-11-17 ENCOUNTER — ANTICOAGULATION VISIT (OUTPATIENT)
Dept: PHARMACY | Facility: HOSPITAL | Age: 73
End: 2023-11-17
Payer: MEDICARE

## 2023-11-17 DIAGNOSIS — Z95.2 HX OF MITRAL VALVE REPLACEMENT WITH MECHANICAL VALVE: Primary | ICD-10-CM

## 2023-11-17 LAB — INR PPP: 3.4

## 2023-12-06 ENCOUNTER — ANTICOAGULATION VISIT (OUTPATIENT)
Dept: PHARMACY | Facility: HOSPITAL | Age: 73
End: 2023-12-06
Payer: MEDICARE

## 2023-12-06 DIAGNOSIS — Z95.2 HX OF MITRAL VALVE REPLACEMENT WITH MECHANICAL VALVE: Primary | ICD-10-CM

## 2023-12-06 LAB — INR PPP: 3.4

## 2023-12-13 ENCOUNTER — ANTICOAGULATION VISIT (OUTPATIENT)
Dept: PHARMACY | Facility: HOSPITAL | Age: 73
End: 2023-12-13
Payer: MEDICARE

## 2023-12-13 DIAGNOSIS — Z95.2 HX OF MITRAL VALVE REPLACEMENT WITH MECHANICAL VALVE: Primary | ICD-10-CM

## 2023-12-13 LAB — INR PPP: 2.9 (ref 3–4)

## 2023-12-26 ENCOUNTER — ANTICOAGULATION VISIT (OUTPATIENT)
Dept: PHARMACY | Facility: HOSPITAL | Age: 73
End: 2023-12-26
Payer: MEDICARE

## 2023-12-26 DIAGNOSIS — Z95.2 HX OF MITRAL VALVE REPLACEMENT WITH MECHANICAL VALVE: Primary | ICD-10-CM

## 2023-12-26 LAB — INR PPP: 3.2

## 2024-01-05 ENCOUNTER — ANTICOAGULATION VISIT (OUTPATIENT)
Dept: PHARMACY | Facility: HOSPITAL | Age: 74
End: 2024-01-05
Payer: MEDICARE

## 2024-01-05 DIAGNOSIS — Z95.2 HX OF MITRAL VALVE REPLACEMENT WITH MECHANICAL VALVE: Primary | ICD-10-CM

## 2024-01-05 LAB — INR PPP: 2.9

## 2024-01-11 ENCOUNTER — TELEPHONE (OUTPATIENT)
Dept: INTERNAL MEDICINE | Facility: CLINIC | Age: 74
End: 2024-01-11
Payer: MEDICARE

## 2024-01-11 ENCOUNTER — TELEPHONE (OUTPATIENT)
Dept: CARDIOLOGY | Facility: CLINIC | Age: 74
End: 2024-01-11
Payer: MEDICARE

## 2024-01-24 ENCOUNTER — TELEPHONE (OUTPATIENT)
Dept: PHARMACY | Facility: HOSPITAL | Age: 74
End: 2024-01-24

## 2024-01-26 ENCOUNTER — ANTICOAGULATION VISIT (OUTPATIENT)
Dept: PHARMACY | Facility: HOSPITAL | Age: 74
End: 2024-01-26
Payer: MEDICARE

## 2024-01-26 DIAGNOSIS — Z95.2 HX OF MITRAL VALVE REPLACEMENT WITH MECHANICAL VALVE: Primary | ICD-10-CM

## 2024-01-26 LAB — INR PPP: 3.3

## 2024-02-02 ENCOUNTER — ANTICOAGULATION VISIT (OUTPATIENT)
Dept: PHARMACY | Facility: HOSPITAL | Age: 74
End: 2024-02-02
Payer: MEDICARE

## 2024-02-02 DIAGNOSIS — Z95.2 HX OF MITRAL VALVE REPLACEMENT WITH MECHANICAL VALVE: Primary | ICD-10-CM

## 2024-02-02 LAB — INR PPP: 2.8

## 2024-02-20 ENCOUNTER — ANTICOAGULATION VISIT (OUTPATIENT)
Dept: PHARMACY | Facility: HOSPITAL | Age: 74
End: 2024-02-20
Payer: MEDICARE

## 2024-02-20 DIAGNOSIS — Z95.2 HX OF MITRAL VALVE REPLACEMENT WITH MECHANICAL VALVE: Primary | ICD-10-CM

## 2024-02-20 LAB — INR PPP: 2.9

## 2024-03-01 ENCOUNTER — ANTICOAGULATION VISIT (OUTPATIENT)
Dept: PHARMACY | Facility: HOSPITAL | Age: 74
End: 2024-03-01
Payer: MEDICARE

## 2024-03-01 DIAGNOSIS — Z95.2 HX OF MITRAL VALVE REPLACEMENT WITH MECHANICAL VALVE: Primary | ICD-10-CM

## 2024-03-01 LAB — INR PPP: 3.1

## 2024-03-18 ENCOUNTER — ANTICOAGULATION VISIT (OUTPATIENT)
Dept: PHARMACY | Facility: HOSPITAL | Age: 74
End: 2024-03-18
Payer: MEDICARE

## 2024-03-18 DIAGNOSIS — Z95.2 HX OF MITRAL VALVE REPLACEMENT WITH MECHANICAL VALVE: Primary | ICD-10-CM

## 2024-03-18 LAB — INR PPP: 3

## 2024-03-20 ENCOUNTER — OFFICE VISIT (OUTPATIENT)
Dept: INTERNAL MEDICINE | Facility: CLINIC | Age: 74
End: 2024-03-20
Payer: MEDICARE

## 2024-03-20 ENCOUNTER — OFFICE VISIT (OUTPATIENT)
Dept: CARDIOLOGY | Facility: CLINIC | Age: 74
End: 2024-03-20
Payer: MEDICARE

## 2024-03-20 ENCOUNTER — TELEPHONE (OUTPATIENT)
Dept: INTERNAL MEDICINE | Facility: CLINIC | Age: 74
End: 2024-03-20

## 2024-03-20 VITALS
DIASTOLIC BLOOD PRESSURE: 68 MMHG | BODY MASS INDEX: 22.58 KG/M2 | OXYGEN SATURATION: 94 % | WEIGHT: 149 LBS | SYSTOLIC BLOOD PRESSURE: 112 MMHG | HEIGHT: 68 IN | TEMPERATURE: 98 F | HEART RATE: 74 BPM

## 2024-03-20 VITALS
HEIGHT: 68 IN | SYSTOLIC BLOOD PRESSURE: 108 MMHG | OXYGEN SATURATION: 90 % | TEMPERATURE: 98.6 F | WEIGHT: 148 LBS | DIASTOLIC BLOOD PRESSURE: 62 MMHG | RESPIRATION RATE: 24 BRPM | BODY MASS INDEX: 22.43 KG/M2 | HEART RATE: 86 BPM

## 2024-03-20 DIAGNOSIS — Z95.2 HX OF MITRAL VALVE REPLACEMENT WITH MECHANICAL VALVE: Primary | ICD-10-CM

## 2024-03-20 DIAGNOSIS — F41.9 ANXIETY: Primary | ICD-10-CM

## 2024-03-20 DIAGNOSIS — I10 ESSENTIAL HYPERTENSION: ICD-10-CM

## 2024-03-20 DIAGNOSIS — J44.9 COPD WITH HYPOXIA: Chronic | ICD-10-CM

## 2024-03-20 DIAGNOSIS — J44.9 CHRONIC OBSTRUCTIVE PULMONARY DISEASE, UNSPECIFIED COPD TYPE: ICD-10-CM

## 2024-03-20 DIAGNOSIS — R51.9 FACIAL PAIN: ICD-10-CM

## 2024-03-20 DIAGNOSIS — R51.9 LEFT-SIDED HEADACHE: ICD-10-CM

## 2024-03-20 RX ORDER — BUSPIRONE HYDROCHLORIDE 5 MG/1
5 TABLET ORAL 3 TIMES DAILY PRN
Qty: 180 TABLET | Refills: 1 | Status: SHIPPED | OUTPATIENT
Start: 2024-03-20

## 2024-03-20 RX ORDER — BUSPIRONE HYDROCHLORIDE 5 MG/1
5 TABLET ORAL 3 TIMES DAILY PRN
Qty: 180 TABLET | Refills: 1 | Status: SHIPPED | OUTPATIENT
Start: 2024-03-20 | End: 2024-03-20 | Stop reason: SDUPTHER

## 2024-03-21 LAB
ALBUMIN SERPL-MCNC: 4.5 G/DL (ref 3.8–4.8)
ALBUMIN/GLOB SERPL: 1.9 {RATIO} (ref 1.2–2.2)
ALP SERPL-CCNC: 121 IU/L (ref 44–121)
ALT SERPL-CCNC: 8 IU/L (ref 0–32)
AST SERPL-CCNC: 17 IU/L (ref 0–40)
BILIRUB SERPL-MCNC: 0.8 MG/DL (ref 0–1.2)
BUN SERPL-MCNC: 14 MG/DL (ref 8–27)
BUN/CREAT SERPL: 20 (ref 12–28)
CALCIUM SERPL-MCNC: 9.1 MG/DL (ref 8.7–10.3)
CHLORIDE SERPL-SCNC: 107 MMOL/L (ref 96–106)
CO2 SERPL-SCNC: 26 MMOL/L (ref 20–29)
CREAT SERPL-MCNC: 0.71 MG/DL (ref 0.57–1)
CRP SERPL-MCNC: 2 MG/L (ref 0–10)
EGFRCR SERPLBLD CKD-EPI 2021: 89 ML/MIN/1.73
ERYTHROCYTE [SEDIMENTATION RATE] IN BLOOD BY WESTERGREN METHOD: 9 MM/HR (ref 0–40)
GLOBULIN SER CALC-MCNC: 2.4 G/DL (ref 1.5–4.5)
GLUCOSE SERPL-MCNC: 92 MG/DL (ref 70–99)
POTASSIUM SERPL-SCNC: 5 MMOL/L (ref 3.5–5.2)
PROT SERPL-MCNC: 6.9 G/DL (ref 6–8.5)
SODIUM SERPL-SCNC: 144 MMOL/L (ref 134–144)

## 2024-04-02 ENCOUNTER — ANTICOAGULATION VISIT (OUTPATIENT)
Dept: PHARMACY | Facility: HOSPITAL | Age: 74
End: 2024-04-02
Payer: MEDICARE

## 2024-04-02 DIAGNOSIS — Z95.2 HX OF MITRAL VALVE REPLACEMENT WITH MECHANICAL VALVE: Primary | ICD-10-CM

## 2024-04-02 LAB — INR PPP: 1.8

## 2024-04-05 ENCOUNTER — ANTICOAGULATION VISIT (OUTPATIENT)
Dept: PHARMACY | Facility: HOSPITAL | Age: 74
End: 2024-04-05
Payer: MEDICARE

## 2024-04-05 DIAGNOSIS — Z95.2 HX OF MITRAL VALVE REPLACEMENT WITH MECHANICAL VALVE: Primary | ICD-10-CM

## 2024-04-05 LAB — INR PPP: 4

## 2024-04-15 ENCOUNTER — ANTICOAGULATION VISIT (OUTPATIENT)
Dept: PHARMACY | Facility: HOSPITAL | Age: 74
End: 2024-04-15
Payer: MEDICARE

## 2024-04-15 DIAGNOSIS — Z95.2 HX OF MITRAL VALVE REPLACEMENT WITH MECHANICAL VALVE: Primary | ICD-10-CM

## 2024-04-15 LAB — INR PPP: 3.4

## 2024-04-26 ENCOUNTER — ANTICOAGULATION VISIT (OUTPATIENT)
Dept: PHARMACY | Facility: HOSPITAL | Age: 74
End: 2024-04-26
Payer: MEDICARE

## 2024-04-26 DIAGNOSIS — Z95.2 HX OF MITRAL VALVE REPLACEMENT WITH MECHANICAL VALVE: Primary | ICD-10-CM

## 2024-04-26 LAB — INR PPP: 2.8

## 2024-05-02 LAB — INR PPP: 4.3

## 2024-05-03 ENCOUNTER — ANTICOAGULATION VISIT (OUTPATIENT)
Dept: PHARMACY | Facility: HOSPITAL | Age: 74
End: 2024-05-03
Payer: MEDICARE

## 2024-05-03 DIAGNOSIS — Z95.2 HX OF MITRAL VALVE REPLACEMENT WITH MECHANICAL VALVE: Primary | ICD-10-CM

## 2024-05-14 ENCOUNTER — ANTICOAGULATION VISIT (OUTPATIENT)
Dept: PHARMACY | Facility: HOSPITAL | Age: 74
End: 2024-05-14
Payer: MEDICARE

## 2024-05-14 DIAGNOSIS — Z95.2 HX OF MITRAL VALVE REPLACEMENT WITH MECHANICAL VALVE: Primary | ICD-10-CM

## 2024-05-14 LAB — INR PPP: 3.8

## 2024-05-22 ENCOUNTER — ANTICOAGULATION VISIT (OUTPATIENT)
Dept: PHARMACY | Facility: HOSPITAL | Age: 74
End: 2024-05-22
Payer: MEDICARE

## 2024-05-22 DIAGNOSIS — Z95.2 HX OF MITRAL VALVE REPLACEMENT WITH MECHANICAL VALVE: Primary | ICD-10-CM

## 2024-05-22 LAB — INR PPP: 3.2

## 2024-05-22 RX ORDER — WARFARIN SODIUM 2 MG/1
TABLET ORAL
Qty: 135 TABLET | Refills: 1 | Status: SHIPPED | OUTPATIENT
Start: 2024-05-22

## 2024-05-28 RX ORDER — TIOTROPIUM BROMIDE AND OLODATEROL 3.124; 2.736 UG/1; UG/1
2 SPRAY, METERED RESPIRATORY (INHALATION) DAILY
Qty: 4 G | Refills: 0 | OUTPATIENT
Start: 2024-05-28

## 2024-06-07 ENCOUNTER — ANTICOAGULATION VISIT (OUTPATIENT)
Dept: PHARMACY | Facility: HOSPITAL | Age: 74
End: 2024-06-07
Payer: MEDICARE

## 2024-06-07 DIAGNOSIS — Z95.2 HX OF MITRAL VALVE REPLACEMENT WITH MECHANICAL VALVE: Primary | ICD-10-CM

## 2024-06-07 LAB
INR PPP: 2.5
INR PPP: 2.5

## 2024-06-14 ENCOUNTER — TELEPHONE (OUTPATIENT)
Dept: PULMONOLOGY | Facility: CLINIC | Age: 74
End: 2024-06-14

## 2024-06-18 ENCOUNTER — ANTICOAGULATION VISIT (OUTPATIENT)
Dept: PHARMACY | Facility: HOSPITAL | Age: 74
End: 2024-06-18
Payer: MEDICARE

## 2024-06-18 DIAGNOSIS — Z95.2 HX OF MITRAL VALVE REPLACEMENT WITH MECHANICAL VALVE: Primary | ICD-10-CM

## 2024-06-18 LAB — INR PPP: 3.2

## 2024-06-25 ENCOUNTER — ANTICOAGULATION VISIT (OUTPATIENT)
Dept: PHARMACY | Facility: HOSPITAL | Age: 74
End: 2024-06-25
Payer: MEDICARE

## 2024-06-25 ENCOUNTER — TELEPHONE (OUTPATIENT)
Dept: INTERNAL MEDICINE | Facility: CLINIC | Age: 74
End: 2024-06-25

## 2024-06-25 DIAGNOSIS — Z95.2 HX OF MITRAL VALVE REPLACEMENT WITH MECHANICAL VALVE: Primary | ICD-10-CM

## 2024-06-25 LAB — INR PPP: 2.9

## 2024-07-02 ENCOUNTER — ANTICOAGULATION VISIT (OUTPATIENT)
Dept: PHARMACY | Facility: HOSPITAL | Age: 74
End: 2024-07-02
Payer: MEDICARE

## 2024-07-02 DIAGNOSIS — Z95.2 HX OF MITRAL VALVE REPLACEMENT WITH MECHANICAL VALVE: Primary | ICD-10-CM

## 2024-07-02 LAB — INR PPP: 3

## 2024-07-09 ENCOUNTER — OFFICE VISIT (OUTPATIENT)
Dept: FAMILY MEDICINE CLINIC | Facility: CLINIC | Age: 74
End: 2024-07-09
Payer: MEDICARE

## 2024-07-09 VITALS
TEMPERATURE: 97.9 F | WEIGHT: 150 LBS | HEART RATE: 77 BPM | HEIGHT: 68 IN | BODY MASS INDEX: 22.73 KG/M2 | OXYGEN SATURATION: 94 % | DIASTOLIC BLOOD PRESSURE: 58 MMHG | SYSTOLIC BLOOD PRESSURE: 128 MMHG

## 2024-07-09 DIAGNOSIS — Z95.2 HX OF MITRAL VALVE REPLACEMENT WITH MECHANICAL VALVE: ICD-10-CM

## 2024-07-09 DIAGNOSIS — I25.10 CORONARY ARTERY DISEASE INVOLVING NATIVE CORONARY ARTERY OF NATIVE HEART WITHOUT ANGINA PECTORIS: Primary | ICD-10-CM

## 2024-07-09 DIAGNOSIS — J44.9 CHRONIC OBSTRUCTIVE PULMONARY DISEASE, UNSPECIFIED COPD TYPE: ICD-10-CM

## 2024-07-09 DIAGNOSIS — I10 ESSENTIAL HYPERTENSION: ICD-10-CM

## 2024-07-09 DIAGNOSIS — E78.5 HYPERLIPIDEMIA, UNSPECIFIED HYPERLIPIDEMIA TYPE: ICD-10-CM

## 2024-07-09 PROCEDURE — 3078F DIAST BP <80 MM HG: CPT | Performed by: PHYSICIAN ASSISTANT

## 2024-07-09 PROCEDURE — 1126F AMNT PAIN NOTED NONE PRSNT: CPT | Performed by: PHYSICIAN ASSISTANT

## 2024-07-09 PROCEDURE — 3074F SYST BP LT 130 MM HG: CPT | Performed by: PHYSICIAN ASSISTANT

## 2024-07-09 PROCEDURE — 1160F RVW MEDS BY RX/DR IN RCRD: CPT | Performed by: PHYSICIAN ASSISTANT

## 2024-07-09 PROCEDURE — 1159F MED LIST DOCD IN RCRD: CPT | Performed by: PHYSICIAN ASSISTANT

## 2024-07-09 PROCEDURE — 99214 OFFICE O/P EST MOD 30 MIN: CPT | Performed by: PHYSICIAN ASSISTANT

## 2024-07-09 RX ORDER — PAROXETINE 30 MG/1
30 TABLET, FILM COATED ORAL EVERY MORNING
Qty: 90 TABLET | Refills: 3 | Status: SHIPPED | OUTPATIENT
Start: 2024-07-09

## 2024-07-09 RX ORDER — TIOTROPIUM BROMIDE AND OLODATEROL 3.124; 2.736 UG/1; UG/1
2 SPRAY, METERED RESPIRATORY (INHALATION) DAILY
Qty: 4 G | Refills: 0 | OUTPATIENT
Start: 2024-07-09

## 2024-07-09 RX ORDER — PRAVASTATIN SODIUM 80 MG/1
80 TABLET ORAL NIGHTLY
Qty: 90 TABLET | Refills: 3 | Status: SHIPPED | OUTPATIENT
Start: 2024-07-09

## 2024-07-09 RX ORDER — TIOTROPIUM BROMIDE AND OLODATEROL 3.124; 2.736 UG/1; UG/1
2 SPRAY, METERED RESPIRATORY (INHALATION) DAILY
Qty: 4 G | Refills: 11 | Status: SHIPPED | OUTPATIENT
Start: 2024-07-09

## 2024-07-11 ENCOUNTER — ANTICOAGULATION VISIT (OUTPATIENT)
Dept: PHARMACY | Facility: HOSPITAL | Age: 74
End: 2024-07-11
Payer: MEDICARE

## 2024-07-11 DIAGNOSIS — Z95.2 HX OF MITRAL VALVE REPLACEMENT WITH MECHANICAL VALVE: Primary | ICD-10-CM

## 2024-07-11 LAB — INR PPP: 2.4

## 2024-07-24 ENCOUNTER — ANTICOAGULATION VISIT (OUTPATIENT)
Dept: PHARMACY | Facility: HOSPITAL | Age: 74
End: 2024-07-24
Payer: MEDICARE

## 2024-07-24 DIAGNOSIS — Z95.2 HX OF MITRAL VALVE REPLACEMENT WITH MECHANICAL VALVE: Primary | ICD-10-CM

## 2024-07-24 LAB — INR PPP: 3.1

## 2024-08-14 ENCOUNTER — ANTICOAGULATION VISIT (OUTPATIENT)
Dept: PHARMACY | Facility: HOSPITAL | Age: 74
End: 2024-08-14
Payer: MEDICARE

## 2024-08-14 DIAGNOSIS — Z95.2 HX OF MITRAL VALVE REPLACEMENT WITH MECHANICAL VALVE: Primary | ICD-10-CM

## 2024-08-14 LAB — INR PPP: 1.8

## 2024-08-20 ENCOUNTER — OFFICE VISIT (OUTPATIENT)
Dept: CARDIOLOGY | Facility: CLINIC | Age: 74
End: 2024-08-20
Payer: MEDICARE

## 2024-08-20 VITALS
SYSTOLIC BLOOD PRESSURE: 120 MMHG | HEART RATE: 85 BPM | HEIGHT: 68 IN | BODY MASS INDEX: 22.73 KG/M2 | WEIGHT: 150 LBS | DIASTOLIC BLOOD PRESSURE: 72 MMHG | OXYGEN SATURATION: 92 %

## 2024-08-20 DIAGNOSIS — I10 ESSENTIAL HYPERTENSION: Primary | ICD-10-CM

## 2024-08-20 DIAGNOSIS — Z95.2 HX OF MITRAL VALVE REPLACEMENT WITH MECHANICAL VALVE: ICD-10-CM

## 2024-08-20 DIAGNOSIS — R55 SYNCOPE, NEAR: ICD-10-CM

## 2024-08-20 DIAGNOSIS — I25.10 CORONARY ARTERY DISEASE INVOLVING NATIVE CORONARY ARTERY OF NATIVE HEART WITHOUT ANGINA PECTORIS: ICD-10-CM

## 2024-08-20 PROCEDURE — 1159F MED LIST DOCD IN RCRD: CPT | Performed by: INTERNAL MEDICINE

## 2024-08-20 PROCEDURE — 93000 ELECTROCARDIOGRAM COMPLETE: CPT | Performed by: INTERNAL MEDICINE

## 2024-08-20 PROCEDURE — 3078F DIAST BP <80 MM HG: CPT | Performed by: INTERNAL MEDICINE

## 2024-08-20 PROCEDURE — 1160F RVW MEDS BY RX/DR IN RCRD: CPT | Performed by: INTERNAL MEDICINE

## 2024-08-20 PROCEDURE — 3074F SYST BP LT 130 MM HG: CPT | Performed by: INTERNAL MEDICINE

## 2024-08-20 PROCEDURE — 99214 OFFICE O/P EST MOD 30 MIN: CPT | Performed by: INTERNAL MEDICINE

## 2024-08-23 ENCOUNTER — ANTICOAGULATION VISIT (OUTPATIENT)
Dept: PHARMACY | Facility: HOSPITAL | Age: 74
End: 2024-08-23
Payer: MEDICARE

## 2024-08-23 DIAGNOSIS — Z95.2 HX OF MITRAL VALVE REPLACEMENT WITH MECHANICAL VALVE: Primary | ICD-10-CM

## 2024-08-23 LAB — INR PPP: 2.2

## 2024-08-28 ENCOUNTER — OFFICE VISIT (OUTPATIENT)
Dept: PULMONOLOGY | Facility: CLINIC | Age: 74
End: 2024-08-28
Payer: MEDICARE

## 2024-08-28 ENCOUNTER — ANTICOAGULATION VISIT (OUTPATIENT)
Dept: PHARMACY | Facility: HOSPITAL | Age: 74
End: 2024-08-28
Payer: MEDICARE

## 2024-08-28 VITALS
BODY MASS INDEX: 22.88 KG/M2 | SYSTOLIC BLOOD PRESSURE: 130 MMHG | WEIGHT: 151 LBS | OXYGEN SATURATION: 91 % | HEART RATE: 77 BPM | DIASTOLIC BLOOD PRESSURE: 78 MMHG | HEIGHT: 68 IN | TEMPERATURE: 97.7 F

## 2024-08-28 DIAGNOSIS — Z95.2 HX OF MITRAL VALVE REPLACEMENT WITH MECHANICAL VALVE: Primary | ICD-10-CM

## 2024-08-28 DIAGNOSIS — Z87.891 FORMER SMOKER: ICD-10-CM

## 2024-08-28 DIAGNOSIS — J96.11 CHRONIC RESPIRATORY FAILURE WITH HYPOXIA: ICD-10-CM

## 2024-08-28 DIAGNOSIS — J44.9 COPD MIXED TYPE: Primary | ICD-10-CM

## 2024-08-28 LAB — INR PPP: 2.9

## 2024-08-28 PROCEDURE — 99215 OFFICE O/P EST HI 40 MIN: CPT | Performed by: INTERNAL MEDICINE

## 2024-08-28 PROCEDURE — 1159F MED LIST DOCD IN RCRD: CPT | Performed by: INTERNAL MEDICINE

## 2024-08-28 PROCEDURE — 3075F SYST BP GE 130 - 139MM HG: CPT | Performed by: INTERNAL MEDICINE

## 2024-08-28 PROCEDURE — 3078F DIAST BP <80 MM HG: CPT | Performed by: INTERNAL MEDICINE

## 2024-08-28 PROCEDURE — 1160F RVW MEDS BY RX/DR IN RCRD: CPT | Performed by: INTERNAL MEDICINE

## 2024-08-29 DIAGNOSIS — J44.9 COPD MIXED TYPE: Primary | ICD-10-CM

## 2024-09-09 ENCOUNTER — ANTICOAGULATION VISIT (OUTPATIENT)
Dept: PHARMACY | Facility: HOSPITAL | Age: 74
End: 2024-09-09
Payer: MEDICARE

## 2024-09-09 DIAGNOSIS — Z95.2 HX OF MITRAL VALVE REPLACEMENT WITH MECHANICAL VALVE: Primary | ICD-10-CM

## 2024-09-09 LAB — INR PPP: 3.2

## 2024-09-25 ENCOUNTER — ANTICOAGULATION VISIT (OUTPATIENT)
Dept: PHARMACY | Facility: HOSPITAL | Age: 74
End: 2024-09-25
Payer: MEDICARE

## 2024-09-25 DIAGNOSIS — Z95.2 HX OF MITRAL VALVE REPLACEMENT WITH MECHANICAL VALVE: Primary | ICD-10-CM

## 2024-09-25 LAB — INR PPP: 3.2

## 2024-09-30 ENCOUNTER — ANTICOAGULATION VISIT (OUTPATIENT)
Dept: PHARMACY | Facility: HOSPITAL | Age: 74
End: 2024-09-30
Payer: MEDICARE

## 2024-09-30 DIAGNOSIS — Z95.2 HX OF MITRAL VALVE REPLACEMENT WITH MECHANICAL VALVE: Primary | ICD-10-CM

## 2024-09-30 LAB — INR PPP: 1.5

## 2024-10-02 ENCOUNTER — ANTICOAGULATION VISIT (OUTPATIENT)
Dept: PHARMACY | Facility: HOSPITAL | Age: 74
End: 2024-10-02
Payer: MEDICARE

## 2024-10-02 DIAGNOSIS — Z95.2 HX OF MITRAL VALVE REPLACEMENT WITH MECHANICAL VALVE: Primary | ICD-10-CM

## 2024-10-02 LAB — INR PPP: 2

## 2024-10-04 ENCOUNTER — ANTICOAGULATION VISIT (OUTPATIENT)
Dept: PHARMACY | Facility: HOSPITAL | Age: 74
End: 2024-10-04
Payer: MEDICARE

## 2024-10-04 ENCOUNTER — OFFICE VISIT (OUTPATIENT)
Dept: FAMILY MEDICINE CLINIC | Facility: CLINIC | Age: 74
End: 2024-10-04
Payer: MEDICARE

## 2024-10-04 VITALS
WEIGHT: 149.8 LBS | SYSTOLIC BLOOD PRESSURE: 132 MMHG | OXYGEN SATURATION: 95 % | BODY MASS INDEX: 22.7 KG/M2 | HEART RATE: 91 BPM | TEMPERATURE: 98.2 F | RESPIRATION RATE: 19 BRPM | DIASTOLIC BLOOD PRESSURE: 74 MMHG | HEIGHT: 68 IN

## 2024-10-04 DIAGNOSIS — Z00.00 MEDICARE ANNUAL WELLNESS VISIT, SUBSEQUENT: Primary | ICD-10-CM

## 2024-10-04 DIAGNOSIS — Z95.2 HX OF MITRAL VALVE REPLACEMENT WITH MECHANICAL VALVE: Primary | ICD-10-CM

## 2024-10-04 LAB — INR PPP: 2.4

## 2024-10-04 PROCEDURE — 1126F AMNT PAIN NOTED NONE PRSNT: CPT | Performed by: PHYSICIAN ASSISTANT

## 2024-10-04 PROCEDURE — 3075F SYST BP GE 130 - 139MM HG: CPT | Performed by: PHYSICIAN ASSISTANT

## 2024-10-04 PROCEDURE — G0439 PPPS, SUBSEQ VISIT: HCPCS | Performed by: PHYSICIAN ASSISTANT

## 2024-10-04 PROCEDURE — 1160F RVW MEDS BY RX/DR IN RCRD: CPT | Performed by: PHYSICIAN ASSISTANT

## 2024-10-04 PROCEDURE — 1159F MED LIST DOCD IN RCRD: CPT | Performed by: PHYSICIAN ASSISTANT

## 2024-10-04 PROCEDURE — 3078F DIAST BP <80 MM HG: CPT | Performed by: PHYSICIAN ASSISTANT

## 2024-10-10 ENCOUNTER — ANTICOAGULATION VISIT (OUTPATIENT)
Dept: PHARMACY | Facility: HOSPITAL | Age: 74
End: 2024-10-10
Payer: MEDICARE

## 2024-10-10 DIAGNOSIS — Z95.2 HX OF MITRAL VALVE REPLACEMENT WITH MECHANICAL VALVE: Primary | ICD-10-CM

## 2024-10-10 LAB — INR PPP: 3.6

## 2024-10-28 ENCOUNTER — ANTICOAGULATION VISIT (OUTPATIENT)
Dept: PHARMACY | Facility: HOSPITAL | Age: 74
End: 2024-10-28
Payer: MEDICARE

## 2024-10-28 DIAGNOSIS — Z95.2 HX OF MITRAL VALVE REPLACEMENT WITH MECHANICAL VALVE: Primary | ICD-10-CM

## 2024-10-28 LAB — INR PPP: 3.1

## 2024-10-28 NOTE — PROGRESS NOTES
Anticoagulation Clinic - Remote Progress Note  [facility]  Testing Frequency: weekly    Indication: mMVR (2008)  Referring Provider: Smitha (last galindo: 10/03/22)  Goal INR: 3.0-4.0  Current Drug Interactions: aspirin, paroxetine, spironolactone    Diet: GLV consistent 3/17/2023  Alcohol: none  Tobacco: none; quit 2008  OTC Pain Medication: APAP PRN    INR History:  Date 9/13 10/2 10/7 10/15 10/21 10/30 11/6 11/13 11/22 11/27 12/3 12/6   Total Weekly Dose 16mg 28mg 20mg 18mg 20mg 20mg 18mg 16mg 14mg 18mg 16mg 18mg   INR 1.98 7.1, 7.2  4.59 justen 3.31 3.18  3.1 4.4 3.7 3.3 2.69 4.1 2.2 2.9   Notes hosp adm clinic    HM training  decr GLV; Ensure ED; hematuria; 2x self-hold rocephin x1, keflex  keflex done 11/29      Date 12/13 12/17 12/24 1/2/20 1/8 1/15 1/21 1/30 2/5 2/19 3/4 3/18   Total Weekly Dose 18mg 20mg 20mg 20mg 20mg 20mg 21mg 20mg 20mg 20mg 20mg 20mg   INR 2.1 3.0 3.1 4.0 3.4 2.7 2.7 3.4 3.5 3.8 2.9 5.1   Notes  Ensure cooked spinach decr GLV; n/v  incr GLV 1x incr dose 1x incr dose   incr GLV? sick, decr appetite     Date 3/20 3/26 4/1 4/7 4/15 4/23 5/1 5/11 5/14 5/22 6/1 6/9   Total Weekly Dose 18mg 20mg 18mg 14mg 17mg 17mg 17mg 18mg? 20mg 20mg 19mg 19mg   INR 3.0 4.0 5.8 3.1 3.7 3.0 2.8 2.4 3.1 4.0 4.2 3.9   Notes 1x hold; Broccoli ensure x1/day  1x hold   decr GLV Miss?         Date 6/14 6/19 6/23 6/29 7/7 7/16 7/24 8/7 8/25 8/28 9/11 9/18   Total Weekly Dose 19mg 20mg? 20mg 19mg 19mg 16mg 19mg 19mg 19mg 14mg 16mg 20mg   INR 3.2 3.1 4.6 4.0 4.0 3.2 3.6 3.6 5.3 2.9 2.9 4.0   Notes  Self adj decr appetite  3x miss ensure 1x miss  No ensure       not eating 1x hold 1x miss      Date 10/3 10/20 10/27 11/1 11/11 11/19 11/25 12/3 12/11 12/17 12/22 12/29   Total Weekly Dose 20mg 19mg 21mg 20mg 20mg 21mg 19mg 20mg 20mg 19mg 17mg 18mg   INR 3.2 2.7 3.0 3.5 3.6 4.1 2.9 3.6 4.8 4.9 4.3 4.1   Notes Syncopal episode; decr vit K   recv'd 11/2; epistaxis  1x extra dose 1x decr dose  no Ensure sick; decr  appetite       Date 1/5/21 1/12 1/19 1/20 2/3 2/9 2/23 3/2 3/9 3/17 4/1 4/9   Total WeeklyDose 18mg 19mg 19mg 18mg 18mg 19mg 18mg 18mg 18mg 18mg 19mg 20mg   INR 3.3 4.3 4.9 2.89 justen  4.0 POCT 3.03 justen  2.9 POCT 3.7 3.5 4.2 2.9 2.9 2.0 3.7   Notes incr GLV extra 1x mis- dose; decr appetite 1x decr dose zero ensure x3d HM  incr EtOH incr GLV  incr GLV 2x incr dose     Date 4/30 5/7 5/18 5/26 6/7 6/24 6/28 7/2 7/11 7/20 8/3 8/17   Total WeeklyDose 20mg 21mg 21mg 21mg 18mg 6mg 13mg  22mg 21mg 21mg 21mg 21mg   INR 2.6 3.2 3.6 3.7 2.6 1.3 1.8 3.2 3.4 3.2 4.1 5.5   Notes paxil incr; decr VitK    1x miss [facility] admit, held for c'scope,  hemorrroids           Date 8/18 8/26 9/7 9/17 10/8 11/2 11/18 11/21 12/2 12/13 12/21 1/13   Total WeeklyDose 18mg 21mg 20mg 21mg 21mg 21mg 21mg 20mg 21mg 21mg 21mg 21mg   INR 3.4 4.2 3.6 4.1 3.5 3.6 4.2 3.7 4.6 3.8 4.6 3.1   Notes 1x hold; justen  1x decr dose    fall  fall, APAP  decr appetite      Date 2/8 2/15 2/24 2/28 3/3 3/8 3/15 4/1 4/7  5/20 6/2   Total WeeklyDose 21mg 19mg 15mg 15mg 22mg 20mg 19 mg 20 mg 20 mg Non compliant 21mg 20 mg   INR 4.9 6.7 2.8 2.5 4.2 4.2 3.7 3.8 3.2  5.2 3.8   Notes Reduced appetite 1x decr   2x miss 2xmiss 1x boost 1x decr             Date 6/27 7/13 8/2 9/1 10/4 10/28 11/4 12/9 12/28  3/17/23 3/27   Total WeeklyDose  20mg 20 mg 20mg 20mg 17mg  20 mg 20 mg Non compliant 21 mg  20 mg   INR 2.9 3.4 3.5 3.1 4.2 2.4 3.5 3.1 5.0  3.2 3.4   Notes 1x miss     1x miss     Inc GLV      Date 4/12 4/15 4/19 5/17/23 6/2 6/20 7/17 8/9/23 08/17  10/23/23 11/3   Total WeeklyDose 20 mg 20 mg  20 mg  20 mg 20 mg 17 mg 20 mg 20 mg 21 mg Non compliant 20 mg 20 mg   INR 3.2 2.7 3.0 3.4 3.0 2.9 3.1 2.6 3.9  3.2 3.1   Notes  rec4/19    1x miss  Possible miss  1x boost        Date 11/10 11/17 12/06 12/13 12/22/23 1/5/2024 1/26/24 2/2/24 2/20 3/1/24 3/18/24 4/2/24   Total WeeklyDose 20 mg 21 mg 20 mg 17mg 20 mg 20mg 20 mg 17 mg 17 mg 20 mg 11 mg    11 mg   INR 2.9 3.4 3.4 2.9 3.2  2.9 3.3 2.8 2.9 3.1 3.0 1.8   Notes    1x miss Rec'd 12/26/23   1x miss  1 miss dose   Several   Missed doses     Date 4/5/2024 4/15 4/26 5/2 5/14/24 5/22 6/7/24 6/18 6/25 7/02 7/11 7/24/24   Total WeeklyDose 17 mg 20 mg 18 mg 21 mg 20 mg 17 mg 20 mg 21 mg 21 mg 21 mg 21 mg 21 mg   INR 4.0 3.4 2.8 4.3 3.8 3.2 2.5 3.2 2.9 3.0 2.4 3.1   Notes 2x boost   Missed dosex1 Rec'd 5/3  1x miss INC Vitamin K   Inc GLV   1x boost      Date 8/14/24 8/23 8/28/24 9/9/24 9/25 9/30 10/2/24 10/4 10/10 10/28/24     Total WeeklyDose 21 mg 21mg  24 mg  21 mg 21 mg 12mg  15 mg 15 mg 24 mg 21 mg     INR 1.8 2.2 2.9 3.2 3.2 1.5 2.0 2.4 3.6 3.1     Notes    Overdue  1x boost     1x BOOST           Phone Interview:  Tablet Strength: 2mg  Patient Contact Info: [phone] - LVM no voicemail  Verbal Release Authorization signed on 10/2/19 -- may speak with Thiago Gill (daughter, [phone]) or Pranay Piedra (son, [phone]); she requests that we LVM on one of her children's phones if she does not answer herself within about a 1/2 days time.   Lab Contact Info: [facility]      Patient Findings  Negatives: Signs/symptoms of thrombosis, Signs/symptoms of bleeding, Laboratory test error suspected, Change in health, Change in alcohol use, Change in activity, Upcoming invasive procedure, Emergency department visit, Upcoming dental procedure, Missed doses, Extra doses, Change in medications, Change in diet/appetite, Hospital admission, Bruising, Other complaints   Comments: All findings negative per patient.     Plan:  1. INR therapeutic today at 3.1 (goal 3.0-4.0). Instructed Ms. Piedra to continue warfarin 3mg oral daily until recheck.  2. Repeat INR 11/6/24--patient prefers to test on Wednesday's   3. Verbal information provided over the phone. Chase Piedra wrote down and RBV dosing instructions, expresses understanding by teach back, and has no further questions at this time    Jori Kilpatrick, Pharmacy Technician  10/28/2024  12:01  EDT    I, Evonne Weaver, [facility], have reviewed the note in full and agree with the assessment and plan.  10/28/24  15:03 EDT

## 2024-12-13 ENCOUNTER — ANTICOAGULATION VISIT (OUTPATIENT)
Dept: PHARMACY | Facility: HOSPITAL | Age: 74
End: 2024-12-13
Payer: MEDICARE

## 2024-12-13 DIAGNOSIS — Z95.2 HX OF MITRAL VALVE REPLACEMENT WITH MECHANICAL VALVE: Primary | ICD-10-CM

## 2024-12-13 LAB — INR PPP: 3.3

## 2024-12-13 NOTE — PROGRESS NOTES
Anticoagulation Clinic - Remote Progress Note  [facility]  Testing Frequency: weekly    Indication: mMVR (2008)  Referring Provider: Smitha (last galindo: 10/03/22)  Goal INR: 3.0-4.0  Current Drug Interactions: aspirin, paroxetine, spironolactone    Diet: GLV consistent 3/17/2023  Alcohol: none  Tobacco: none; quit 2008  OTC Pain Medication: APAP PRN    INR History:  Date 9/13 10/2 10/7 10/15 10/21 10/30 11/6 11/13 11/22 11/27 12/3 12/6   Total Weekly Dose 16mg 28mg 20mg 18mg 20mg 20mg 18mg 16mg 14mg 18mg 16mg 18mg   INR 1.98 7.1, 7.2  4.59 justen 3.31 3.18  3.1 4.4 3.7 3.3 2.69 4.1 2.2 2.9   Notes hosp adm clinic    HM training  decr GLV; Ensure ED; hematuria; 2x self-hold rocephin x1, keflex  keflex done 11/29      Date 12/13 12/17 12/24 1/2/20 1/8 1/15 1/21 1/30 2/5 2/19 3/4 3/18   Total Weekly Dose 18mg 20mg 20mg 20mg 20mg 20mg 21mg 20mg 20mg 20mg 20mg 20mg   INR 2.1 3.0 3.1 4.0 3.4 2.7 2.7 3.4 3.5 3.8 2.9 5.1   Notes  Ensure cooked spinach decr GLV; n/v  incr GLV 1x incr dose 1x incr dose   incr GLV? sick, decr appetite     Date 3/20 3/26 4/1 4/7 4/15 4/23 5/1 5/11 5/14 5/22 6/1 6/9   Total Weekly Dose 18mg 20mg 18mg 14mg 17mg 17mg 17mg 18mg? 20mg 20mg 19mg 19mg   INR 3.0 4.0 5.8 3.1 3.7 3.0 2.8 2.4 3.1 4.0 4.2 3.9   Notes 1x hold; Broccoli ensure x1/day  1x hold   decr GLV Miss?         Date 6/14 6/19 6/23 6/29 7/7 7/16 7/24 8/7 8/25 8/28 9/11 9/18   Total Weekly Dose 19mg 20mg? 20mg 19mg 19mg 16mg 19mg 19mg 19mg 14mg 16mg 20mg   INR 3.2 3.1 4.6 4.0 4.0 3.2 3.6 3.6 5.3 2.9 2.9 4.0   Notes  Self adj decr appetite  3x miss ensure 1x miss  No ensure       not eating 1x hold 1x miss      Date 10/3 10/20 10/27 11/1 11/11 11/19 11/25 12/3 12/11 12/17 12/22 12/29   Total Weekly Dose 20mg 19mg 21mg 20mg 20mg 21mg 19mg 20mg 20mg 19mg 17mg 18mg   INR 3.2 2.7 3.0 3.5 3.6 4.1 2.9 3.6 4.8 4.9 4.3 4.1   Notes Syncopal episode; decr vit K   recv'd 11/2; epistaxis  1x extra dose 1x decr dose  no Ensure sick; decr  appetite       Date 1/5/21 1/12 1/19 1/20 2/3 2/9 2/23 3/2 3/9 3/17 4/1 4/9   Total WeeklyDose 18mg 19mg 19mg 18mg 18mg 19mg 18mg 18mg 18mg 18mg 19mg 20mg   INR 3.3 4.3 4.9 2.89 justen  4.0 POCT 3.03 justen  2.9 POCT 3.7 3.5 4.2 2.9 2.9 2.0 3.7   Notes incr GLV extra 1x mis- dose; decr appetite 1x decr dose zero ensure x3d HM  incr EtOH incr GLV  incr GLV 2x incr dose     Date 4/30 5/7 5/18 5/26 6/7 6/24 6/28 7/2 7/11 7/20 8/3 8/17   Total WeeklyDose 20mg 21mg 21mg 21mg 18mg 6mg 13mg  22mg 21mg 21mg 21mg 21mg   INR 2.6 3.2 3.6 3.7 2.6 1.3 1.8 3.2 3.4 3.2 4.1 5.5   Notes paxil incr; decr VitK    1x miss [facility] admit, held for c'scope,  hemorrroids           Date 8/18 8/26 9/7 9/17 10/8 11/2 11/18 11/21 12/2 12/13 12/21 1/13   Total WeeklyDose 18mg 21mg 20mg 21mg 21mg 21mg 21mg 20mg 21mg 21mg 21mg 21mg   INR 3.4 4.2 3.6 4.1 3.5 3.6 4.2 3.7 4.6 3.8 4.6 3.1   Notes 1x hold; justen  1x decr dose    fall  fall, APAP  decr appetite      Date 2/8 2/15 2/24 2/28 3/3 3/8 3/15 4/1 4/7  5/20 6/2   Total WeeklyDose 21mg 19mg 15mg 15mg 22mg 20mg 19 mg 20 mg 20 mg Non compliant 21mg 20 mg   INR 4.9 6.7 2.8 2.5 4.2 4.2 3.7 3.8 3.2  5.2 3.8   Notes Reduced appetite 1x decr   2x miss 2xmiss 1x boost 1x decr             Date 6/27 7/13 8/2 9/1 10/4 10/28 11/4 12/9 12/28  3/17/23 3/27   Total WeeklyDose  20mg 20 mg 20mg 20mg 17mg  20 mg 20 mg Non compliant 21 mg  20 mg   INR 2.9 3.4 3.5 3.1 4.2 2.4 3.5 3.1 5.0  3.2 3.4   Notes 1x miss     1x miss     Inc GLV      Date 4/12 4/15 4/19 5/17/23 6/2 6/20 7/17 8/9/23 08/17  10/23/23 11/3   Total WeeklyDose 20 mg 20 mg  20 mg  20 mg 20 mg 17 mg 20 mg 20 mg 21 mg Non compliant 20 mg 20 mg   INR 3.2 2.7 3.0 3.4 3.0 2.9 3.1 2.6 3.9  3.2 3.1   Notes  rec4/19    1x miss  Possible miss  1x boost        Date 11/10 11/17 12/06 12/13 12/22/23 1/5/2024 1/26/24 2/2/24 2/20 3/1/24 3/18/24 4/2/24   Total WeeklyDose 20 mg 21 mg 20 mg 17mg 20 mg 20mg 20 mg 17 mg 17 mg 20 mg 11 mg    11 mg   INR 2.9 3.4 3.4 2.9 3.2  2.9 3.3 2.8 2.9 3.1 3.0 1.8   Notes    1x miss Rec'd 12/26/23   1x miss  1 miss dose   Several   Missed doses     Date 4/5/2024 4/15 4/26 5/2 5/14/24 5/22 6/7/24 6/18 6/25 7/02 7/11 7/24/24   Total WeeklyDose 17 mg 20 mg 18 mg 21 mg 20 mg 17 mg 20 mg 21 mg 21 mg 21 mg 21 mg 21 mg   INR 4.0 3.4 2.8 4.3 3.8 3.2 2.5 3.2 2.9 3.0 2.4 3.1   Notes 2x boost   Missed dosex1 Rec'd 5/3  1x miss INC Vitamin K   Inc GLV   1x boost      Date 8/14/24 8/23 8/28/24 9/9/24 9/25 9/30 10/2/24 10/4 10/10 10/28/24 12/13    Total WeeklyDose 21 mg 21mg  24 mg  21 mg 21 mg 12mg  15 mg 15 mg 24 mg 21 mg 21 mg    INR 1.8 2.2 2.9 3.2 3.2 1.5 2.0 2.4 3.6 3.1 3.3    Notes    Overdue  1x boost     1x BOOST           Phone Interview:  Tablet Strength: 2mg  Patient Contact Info: [phone] - LVM no voicemail  Verbal Release Authorization signed on 10/2/19 -- may speak with Thiago Gill (daughter, [phone]) or Pranay Piedra (son, [phone]); she requests that we LVM on one of her children's phones if she does not answer herself within about a 1/2 days time.   Lab Contact Info: [facility]    Patient Findings    Positives: Change in health   Negatives: Signs/symptoms of thrombosis, Signs/symptoms of bleeding, Laboratory test error suspected, Change in alcohol use, Change in activity, Upcoming invasive procedure, Emergency department visit, Upcoming dental procedure, Missed doses, Extra doses, Change in medications, Change in diet/appetite, Hospital admission, Bruising, Other complaints   Comments: Patient is currently sick. She says she has not seen a doctor. Advised patient to seek medical attention if her illness persists or gets worse. All other findings negative per patient.     Plan:  1. INR therapeutic today at 3.3 (goal 3.0-4.0). Instructed Ms. Piedra to continue warfarin 3 mg daily until recheck.  2. Repeat INR 12.18.24 -- patient prefers to test on Wednesday's   3. Verbal information provided over the phone. Chase Piedra wrote  down and RBV dosing instructions, expresses understanding by teach back, and has no further questions at this time        Annie Ovalles CPhT   15:28 EST 12/13/2024    I, Corazon Plascencia, [facility], have reviewed the note in full and agree with the assessment and plan.  12/13/24  16:05 EST

## 2024-12-23 DIAGNOSIS — Z95.2 HX OF MITRAL VALVE REPLACEMENT WITH MECHANICAL VALVE: ICD-10-CM

## 2024-12-23 RX ORDER — WARFARIN SODIUM 2 MG/1
TABLET ORAL
Qty: 135 TABLET | Refills: 0 | Status: SHIPPED | OUTPATIENT
Start: 2024-12-23

## 2025-01-03 ENCOUNTER — ANTICOAGULATION VISIT (OUTPATIENT)
Dept: PHARMACY | Facility: HOSPITAL | Age: 75
End: 2025-01-03
Payer: MEDICARE

## 2025-01-03 DIAGNOSIS — Z95.2 HX OF MITRAL VALVE REPLACEMENT WITH MECHANICAL VALVE: Primary | ICD-10-CM

## 2025-01-03 LAB — INR PPP: 3.1

## 2025-01-03 NOTE — PROGRESS NOTES
Anticoagulation Clinic - Remote Progress Note  [facility]  Testing Frequency: weekly    Indication: mMVR (2008)  Referring Provider: Smitha (last galindo: 10/03/22)  Goal INR: 3.0-4.0  Current Drug Interactions: aspirin, paroxetine, spironolactone    Diet: GLV consistent 3/17/2023  Alcohol: none  Tobacco: none; quit 2008  OTC Pain Medication: APAP PRN    INR History:  Date 9/13 10/2 10/7 10/15 10/21 10/30 11/6 11/13 11/22 11/27 12/3 12/6   Total Weekly Dose 16mg 28mg 20mg 18mg 20mg 20mg 18mg 16mg 14mg 18mg 16mg 18mg   INR 1.98 7.1, 7.2  4.59 justen 3.31 3.18  3.1 4.4 3.7 3.3 2.69 4.1 2.2 2.9   Notes hosp adm clinic    HM training  decr GLV; Ensure ED; hematuria; 2x self-hold rocephin x1, keflex  keflex done 11/29      Date 12/13 12/17 12/24 1/2/20 1/8 1/15 1/21 1/30 2/5 2/19 3/4 3/18   Total Weekly Dose 18mg 20mg 20mg 20mg 20mg 20mg 21mg 20mg 20mg 20mg 20mg 20mg   INR 2.1 3.0 3.1 4.0 3.4 2.7 2.7 3.4 3.5 3.8 2.9 5.1   Notes  Ensure cooked spinach decr GLV; n/v  incr GLV 1x incr dose 1x incr dose   incr GLV? sick, decr appetite     Date 3/20 3/26 4/1 4/7 4/15 4/23 5/1 5/11 5/14 5/22 6/1 6/9   Total Weekly Dose 18mg 20mg 18mg 14mg 17mg 17mg 17mg 18mg? 20mg 20mg 19mg 19mg   INR 3.0 4.0 5.8 3.1 3.7 3.0 2.8 2.4 3.1 4.0 4.2 3.9   Notes 1x hold; Broccoli ensure x1/day  1x hold   decr GLV Miss?         Date 6/14 6/19 6/23 6/29 7/7 7/16 7/24 8/7 8/25 8/28 9/11 9/18   Total Weekly Dose 19mg 20mg? 20mg 19mg 19mg 16mg 19mg 19mg 19mg 14mg 16mg 20mg   INR 3.2 3.1 4.6 4.0 4.0 3.2 3.6 3.6 5.3 2.9 2.9 4.0   Notes  Self adj decr appetite  3x miss ensure 1x miss  No ensure       not eating 1x hold 1x miss      Date 10/3 10/20 10/27 11/1 11/11 11/19 11/25 12/3 12/11 12/17 12/22 12/29   Total Weekly Dose 20mg 19mg 21mg 20mg 20mg 21mg 19mg 20mg 20mg 19mg 17mg 18mg   INR 3.2 2.7 3.0 3.5 3.6 4.1 2.9 3.6 4.8 4.9 4.3 4.1   Notes Syncopal episode; decr vit K   recv'd 11/2; epistaxis  1x extra dose 1x decr dose  no Ensure sick; decr  appetite       Date 1/5/21 1/12 1/19 1/20 2/3 2/9 2/23 3/2 3/9 3/17 4/1 4/9   Total WeeklyDose 18mg 19mg 19mg 18mg 18mg 19mg 18mg 18mg 18mg 18mg 19mg 20mg   INR 3.3 4.3 4.9 2.89 justen  4.0 POCT 3.03 justen  2.9 POCT 3.7 3.5 4.2 2.9 2.9 2.0 3.7   Notes incr GLV extra 1x mis- dose; decr appetite 1x decr dose zero ensure x3d HM  incr EtOH incr GLV  incr GLV 2x incr dose     Date 4/30 5/7 5/18 5/26 6/7 6/24 6/28 7/2 7/11 7/20 8/3 8/17   Total WeeklyDose 20mg 21mg 21mg 21mg 18mg 6mg 13mg  22mg 21mg 21mg 21mg 21mg   INR 2.6 3.2 3.6 3.7 2.6 1.3 1.8 3.2 3.4 3.2 4.1 5.5   Notes paxil incr; decr VitK    1x miss [facility] admit, held for c'scope,  hemorrroids           Date 8/18 8/26 9/7 9/17 10/8 11/2 11/18 11/21 12/2 12/13 12/21 1/13   Total WeeklyDose 18mg 21mg 20mg 21mg 21mg 21mg 21mg 20mg 21mg 21mg 21mg 21mg   INR 3.4 4.2 3.6 4.1 3.5 3.6 4.2 3.7 4.6 3.8 4.6 3.1   Notes 1x hold; justen  1x decr dose    fall  fall, APAP  decr appetite      Date 2/8 2/15 2/24 2/28 3/3 3/8 3/15 4/1 4/7  5/20 6/2   Total WeeklyDose 21mg 19mg 15mg 15mg 22mg 20mg 19 mg 20 mg 20 mg Non compliant 21mg 20 mg   INR 4.9 6.7 2.8 2.5 4.2 4.2 3.7 3.8 3.2  5.2 3.8   Notes Reduced appetite 1x decr   2x miss 2xmiss 1x boost 1x decr             Date 6/27 7/13 8/2 9/1 10/4 10/28 11/4 12/9 12/28  3/17/23 3/27   Total WeeklyDose  20mg 20 mg 20mg 20mg 17mg  20 mg 20 mg Non compliant 21 mg  20 mg   INR 2.9 3.4 3.5 3.1 4.2 2.4 3.5 3.1 5.0  3.2 3.4   Notes 1x miss     1x miss     Inc GLV      Date 4/12 4/15 4/19 5/17/23 6/2 6/20 7/17 8/9/23 08/17  10/23/23 11/3   Total WeeklyDose 20 mg 20 mg  20 mg  20 mg 20 mg 17 mg 20 mg 20 mg 21 mg Non compliant 20 mg 20 mg   INR 3.2 2.7 3.0 3.4 3.0 2.9 3.1 2.6 3.9  3.2 3.1   Notes  rec4/19    1x miss  Possible miss  1x boost        Date 11/10 11/17 12/06 12/13 12/22/23 1/5/2024 1/26/24 2/2/24 2/20 3/1/24 3/18/24 4/2/24   Total WeeklyDose 20 mg 21 mg 20 mg 17mg 20 mg 20mg 20 mg 17 mg 17 mg 20 mg 11 mg    11 mg   INR 2.9 3.4 3.4 2.9 3.2  2.9 3.3 2.8 2.9 3.1 3.0 1.8   Notes    1x miss Rec'd 12/26/23   1x miss  1 miss dose   Several   Missed doses     Date 4/5/2024 4/15 4/26 5/2 5/14/24 5/22 6/7/24 6/18 6/25 7/02 7/11 7/24/24   Total WeeklyDose 17 mg 20 mg 18 mg 21 mg 20 mg 17 mg 20 mg 21 mg 21 mg 21 mg 21 mg 21 mg   INR 4.0 3.4 2.8 4.3 3.8 3.2 2.5 3.2 2.9 3.0 2.4 3.1   Notes 2x boost   Missed dosex1 Rec'd 5/3  1x miss INC Vitamin K   Inc GLV   1x boost      Date 8/14/24 8/23 8/28/24 9/9/24 9/25 9/30 10/2/24 10/4 10/10 10/28/24 12/13 1/2   Total WeeklyDose 21 mg 21mg  24 mg  21 mg 21 mg 12mg  15 mg 15 mg 24 mg 21 mg 21 mg 21 mg   INR 1.8 2.2 2.9 3.2 3.2 1.5 2.0 2.4 3.6 3.1 3.3 3.1   Notes    Overdue  1x boost     1x BOOST      Bleeding   Rec 1/3     Phone Interview:  Tablet Strength: 2mg  Patient Contact Info: [phone] - LVM no voicemail  Verbal Release Authorization signed on 10/2/19 -- may speak with Thiago Gill (daughter, [phone]) or Pranay Piedra (son, [phone]); she requests that we LVM on one of her children's phones if she does not answer herself within about a 1/2 days time.   Lab Contact Info: [facility]    Patient Findings    Positives: Signs/symptoms of bleeding, Change in health, Missed doses   Negatives: Signs/symptoms of thrombosis, Laboratory test error suspected, Change in alcohol use, Change in activity, Upcoming invasive procedure, Emergency department visit, Upcoming dental procedure, Extra doses, Change in medications, Change in diet/appetite, Hospital admission, Bruising, Other complaints   Comments: Patient reports she was sick the majority of last month. She says she was coughing so hard that it agitated an internal hemmorhoid and was experiencing rectal bleeding. Patient held warfarin for 2 days to help stop bleeding, doesn't remember which days she held. Patient reports it is now resolved. All other findings negative per patient.     Plan:  1. INR was therapeutic yesterday at 3.1 (goal 3.0-4.0). Result  received today. Instructed Ms. Piedra to continue warfarin 3 mg daily until recheck.  2. Repeat INR in 3 weeks, 1.23.25. Patient prefers testing Wednesdays.  3. Verbal information provided over the phone. Chase Piedra wrote down and RBV dosing instructions, expresses understanding by teach back, and has no further questions at this time        Annie Ovalles CPhT   15:12 EST 1/3/2025    IEvonne, [facility], have reviewed the note in full and agree with the assessment and plan.  01/03/25  15:29 EST

## 2025-01-21 ENCOUNTER — OFFICE VISIT (OUTPATIENT)
Dept: FAMILY MEDICINE CLINIC | Facility: CLINIC | Age: 75
End: 2025-01-21
Payer: MEDICARE

## 2025-01-21 VITALS
OXYGEN SATURATION: 95 % | WEIGHT: 148 LBS | TEMPERATURE: 97.8 F | DIASTOLIC BLOOD PRESSURE: 70 MMHG | SYSTOLIC BLOOD PRESSURE: 106 MMHG | HEIGHT: 68 IN | BODY MASS INDEX: 22.43 KG/M2 | HEART RATE: 80 BPM

## 2025-01-21 DIAGNOSIS — J30.9 ALLERGIC RHINITIS, UNSPECIFIED SEASONALITY, UNSPECIFIED TRIGGER: ICD-10-CM

## 2025-01-21 DIAGNOSIS — R05.3 CHRONIC COUGH: Primary | ICD-10-CM

## 2025-01-21 DIAGNOSIS — J44.9 CHRONIC OBSTRUCTIVE PULMONARY DISEASE, UNSPECIFIED COPD TYPE: ICD-10-CM

## 2025-01-21 PROCEDURE — 1126F AMNT PAIN NOTED NONE PRSNT: CPT | Performed by: PHYSICIAN ASSISTANT

## 2025-01-21 PROCEDURE — 3074F SYST BP LT 130 MM HG: CPT | Performed by: PHYSICIAN ASSISTANT

## 2025-01-21 PROCEDURE — 1159F MED LIST DOCD IN RCRD: CPT | Performed by: PHYSICIAN ASSISTANT

## 2025-01-21 PROCEDURE — 3078F DIAST BP <80 MM HG: CPT | Performed by: PHYSICIAN ASSISTANT

## 2025-01-21 PROCEDURE — 99214 OFFICE O/P EST MOD 30 MIN: CPT | Performed by: PHYSICIAN ASSISTANT

## 2025-01-21 PROCEDURE — 1160F RVW MEDS BY RX/DR IN RCRD: CPT | Performed by: PHYSICIAN ASSISTANT

## 2025-01-21 PROCEDURE — G2211 COMPLEX E/M VISIT ADD ON: HCPCS | Performed by: PHYSICIAN ASSISTANT

## 2025-01-21 RX ORDER — MONTELUKAST SODIUM 10 MG/1
10 TABLET ORAL NIGHTLY
Qty: 30 TABLET | Refills: 5 | Status: SHIPPED | OUTPATIENT
Start: 2025-01-21

## 2025-01-21 NOTE — PROGRESS NOTES
Follow Up Office Visit    Date: 2025   Patient Name: Chase Piedra  : 1950   MRN: [id number]     Chief Complaint:    Chief Complaint   Patient presents with    Cough     Pt states that she had this cough on and off for over a year now  Pt has COPD and had covid last year and every since the cough its any better  Pt states that her taste is still effected, she still keeps the chills and stays achy. She states that she is unable to sleep she takes Nyquil every night. Now she is having issues with her stomach. She states that she coughs so much that she is having to wear pads all the time   She never leaves her house she so isn't exposed to anything       History of Present Illness:   Chase Piedra is a 74 y.o. female.  History of Present Illness  The patient presents for evaluation of a persistent cough.    She has been experiencing a recurrent cough since her COVID-19 infection, which she attributes to her underlying COPD. She manages her COPD with daily inhaler use, taking 2 puffs in the morning and evening, and continuous oxygen therapy at home. Despite not using her oxygen mask since the previous night, her oxygen saturation levels remain within normal limits. She reports that her coughing episodes are often preceded by rapid sneezing. She has expressed concerns about potential environmental factors contributing to her symptoms, including mold growth in her apartment. She has consulted a pulmonologist once and has a follow-up appointment scheduled for 2024 or 2024. She declined participation in a workshop offered by the pulmonology office, preferring to discuss it with her pulmonologist first. She has not previously been prescribed Singulair and has experienced adverse reactions to steroids in the past. She also reports headaches and a sensation of tightness under her breast, extending to her ribs.    Supplemental Information  She had an internal hemorrhoid burst 3 years ago,  "which required hospitalization and cessation of warfarin. She had another episode of bleeding earlier this year due to severe coughing, but it resolved on its own. She had a mild heart attack a few years ago and is due for an ultrasound of her heart. She is on warfarin.    MEDICATIONS  Current: warfarin        Subjective    Review of systems:  Review of Systems     I have reviewed and the following portions of the patient's history were updated as appropriate: past family history, past medical history, past social history, past surgical history and problem list.    Medications:     Current Outpatient Medications:     O2 (OXYGEN), Inhale 3 L/min 1 (One) Time., Disp: , Rfl:     PARoxetine (Paxil) 30 MG tablet, Take 1 tablet by mouth Every Morning., Disp: 90 tablet, Rfl: 3    pravastatin (PRAVACHOL) 80 MG tablet, Take 1 tablet by mouth Every Night., Disp: 90 tablet, Rfl: 3    tiotropium bromide-olodaterol (Stiolto Respimat) 2.5-2.5 MCG/ACT aerosol solution inhaler, Inhale 2 puffs Daily., Disp: 4 g, Rfl: 11    Ventolin  (90 Base) MCG/ACT inhaler, Inhale 2 puffs Every 4 (Four) Hours As Needed for Wheezing or Shortness of Air., Disp: 18 g, Rfl: 5    warfarin (COUMADIN) 2 MG tablet, TAKE 1 TO 1 & 1/2 (ONE & ONE-HALF) TABLETS BY MOUTH ONCE DAILY OR AS DIRECTED BT THE ANTICOAGULATION CLINIC, Disp: 135 tablet, Rfl: 0    montelukast (Singulair) 10 MG tablet, Take 1 tablet by mouth Every Night., Disp: 30 tablet, Rfl: 5    Allergies:   No Known Allergies    Objective   Vital Signs:   Vitals:    01/21/25 0936   BP: 106/70   Pulse: 80   Temp: 97.8 °F (36.6 °C)   TempSrc: Infrared   SpO2: 95%   Weight: 67.1 kg (148 lb)   Height: 172.7 cm (67.99\")   PainSc: 0-No pain     Body mass index is 22.51 kg/m².   BMI is within normal parameters. No other follow-up for BMI required.      Physical Exam:   Physical Exam  Vitals and nursing note reviewed.   Constitutional:       Appearance: Normal appearance.   HENT:      Head: " Normocephalic and atraumatic.      Nose: No congestion or rhinorrhea.      Mouth/Throat:      Mouth: Mucous membranes are moist.      Pharynx: Oropharynx is clear. No posterior oropharyngeal erythema.   Cardiovascular:      Rate and Rhythm: Normal rate and regular rhythm.   Pulmonary:      Effort: Pulmonary effort is normal.      Breath sounds: Normal breath sounds. No decreased breath sounds, wheezing, rhonchi or rales.   Musculoskeletal:      Cervical back: Neck supple.      Right lower leg: No edema.      Left lower leg: No edema.   Lymphadenopathy:      Cervical: No cervical adenopathy.   Neurological:      Mental Status: She is alert.          Procedures     Assessment / Plan    Assessment/Plan:   Diagnoses and all orders for this visit:    1. Chronic cough (Primary)  -     montelukast (Singulair) 10 MG tablet; Take 1 tablet by mouth Every Night.  Dispense: 30 tablet; Refill: 5  -     XR Chest PA & Lateral; Future    2. Chronic obstructive pulmonary disease, unspecified COPD type  -     montelukast (Singulair) 10 MG tablet; Take 1 tablet by mouth Every Night.  Dispense: 30 tablet; Refill: 5  -     XR Chest PA & Lateral; Future    3. Allergic rhinitis, unspecified seasonality, unspecified trigger  -     montelukast (Singulair) 10 MG tablet; Take 1 tablet by mouth Every Night.  Dispense: 30 tablet; Refill: 5       Assessment & Plan  1. Chronic Obstructive Pulmonary Disease (COPD).  The patient reports ongoing issues with COPD, which she manages with her inhaler (2 puffs daily) and continuous use of an oxygen generator at home. She experiences episodes of sneezing followed by coughing, which may indicate an environmental allergy component. A prescription for Singulair 10 mg, to be taken once daily at night, has been provided to help manage these symptoms. Potential side effects, including headaches, have been discussed. A chest x-ray will be conducted today to assess her lung condition. A follow-up appointment  with a pulmonologist will be scheduled. If the patient experiences headaches from Singulair, the medication will be discontinued.      Follow Up:   No follow-ups on file.    Patient or patient representative verbalized consent for the use of Ambient Listening during the visit with  Ivania Stover PA-C for chart documentation. 1/21/2025  15:56 EST    Ivania Stover PA-C   [facility] [place]

## 2025-02-19 ENCOUNTER — TELEPHONE (OUTPATIENT)
Dept: PHARMACY | Facility: HOSPITAL | Age: 75
End: 2025-02-19

## 2025-02-19 NOTE — TELEPHONE ENCOUNTER
Overdue INR Reminder Documentation    Attempted to call the patient for an INR reminder.     This is the First attempt. . WillCall again in 3 days.     Patient was due to recheck 1/23. LVM that we have received fax from [facility] requesting INR check.    Keith Michel   [facility]  2/19/2025 15:45 EST

## 2025-02-21 ENCOUNTER — ANTICOAGULATION VISIT (OUTPATIENT)
Dept: PHARMACY | Facility: HOSPITAL | Age: 75
End: 2025-02-21
Payer: MEDICARE

## 2025-02-21 DIAGNOSIS — Z95.2 HX OF MITRAL VALVE REPLACEMENT WITH MECHANICAL VALVE: Primary | ICD-10-CM

## 2025-02-21 LAB — INR PPP: 2.9

## 2025-02-21 NOTE — PROGRESS NOTES
Anticoagulation Clinic - Remote Progress Note  [facility]  Testing Frequency: weekly    Indication: mMVR (2008)  Referring Provider: Smitha (last galindo: 10/03/22)  Goal INR: 3.0-4.0  Current Drug Interactions: aspirin, paroxetine, spironolactone    Diet: GLV consistent 3/17/2023  Alcohol: none  Tobacco: none; quit 2008  OTC Pain Medication: APAP PRN    INR History:  Date 9/13 10/2 10/7 10/15 10/21 10/30 11/6 11/13 11/22 11/27 12/3 12/6   Total Weekly Dose 16mg 28mg 20mg 18mg 20mg 20mg 18mg 16mg 14mg 18mg 16mg 18mg   INR 1.98 7.1, 7.2  4.59 justen 3.31 3.18  3.1 4.4 3.7 3.3 2.69 4.1 2.2 2.9   Notes hosp adm clinic    HM training  decr GLV; Ensure ED; hematuria; 2x self-hold rocephin x1, keflex  keflex done 11/29      Date 12/13 12/17 12/24 1/2/20 1/8 1/15 1/21 1/30 2/5 2/19 3/4 3/18   Total Weekly Dose 18mg 20mg 20mg 20mg 20mg 20mg 21mg 20mg 20mg 20mg 20mg 20mg   INR 2.1 3.0 3.1 4.0 3.4 2.7 2.7 3.4 3.5 3.8 2.9 5.1   Notes  Ensure cooked spinach decr GLV; n/v  incr GLV 1x incr dose 1x incr dose   incr GLV? sick, decr appetite     Date 3/20 3/26 4/1 4/7 4/15 4/23 5/1 5/11 5/14 5/22 6/1 6/9   Total Weekly Dose 18mg 20mg 18mg 14mg 17mg 17mg 17mg 18mg? 20mg 20mg 19mg 19mg   INR 3.0 4.0 5.8 3.1 3.7 3.0 2.8 2.4 3.1 4.0 4.2 3.9   Notes 1x hold; Broccoli ensure x1/day  1x hold   decr GLV Miss?         Date 6/14 6/19 6/23 6/29 7/7 7/16 7/24 8/7 8/25 8/28 9/11 9/18   Total Weekly Dose 19mg 20mg? 20mg 19mg 19mg 16mg 19mg 19mg 19mg 14mg 16mg 20mg   INR 3.2 3.1 4.6 4.0 4.0 3.2 3.6 3.6 5.3 2.9 2.9 4.0   Notes  Self adj decr appetite  3x miss ensure 1x miss  No ensure       not eating 1x hold 1x miss      Date 10/3 10/20 10/27 11/1 11/11 11/19 11/25 12/3 12/11 12/17 12/22 12/29   Total Weekly Dose 20mg 19mg 21mg 20mg 20mg 21mg 19mg 20mg 20mg 19mg 17mg 18mg   INR 3.2 2.7 3.0 3.5 3.6 4.1 2.9 3.6 4.8 4.9 4.3 4.1   Notes Syncopal episode; decr vit K   recv'd 11/2; epistaxis  1x extra dose 1x decr dose  no Ensure sick; decr  appetite       Date 1/5/21 1/12 1/19 1/20 2/3 2/9 2/23 3/2 3/9 3/17 4/1 4/9   Total WeeklyDose 18mg 19mg 19mg 18mg 18mg 19mg 18mg 18mg 18mg 18mg 19mg 20mg   INR 3.3 4.3 4.9 2.89 justen  4.0 POCT 3.03 justen  2.9 POCT 3.7 3.5 4.2 2.9 2.9 2.0 3.7   Notes incr GLV extra 1x mis- dose; decr appetite 1x decr dose zero ensure x3d HM  incr EtOH incr GLV  incr GLV 2x incr dose     Date 4/30 5/7 5/18 5/26 6/7 6/24 6/28 7/2 7/11 7/20 8/3 8/17   Total WeeklyDose 20mg 21mg 21mg 21mg 18mg 6mg 13mg  22mg 21mg 21mg 21mg 21mg   INR 2.6 3.2 3.6 3.7 2.6 1.3 1.8 3.2 3.4 3.2 4.1 5.5   Notes paxil incr; decr VitK    1x miss [facility] admit, held for c'scope,  hemorrroids           Date 8/18 8/26 9/7 9/17 10/8 11/2 11/18 11/21 12/2 12/13 12/21 1/13   Total WeeklyDose 18mg 21mg 20mg 21mg 21mg 21mg 21mg 20mg 21mg 21mg 21mg 21mg   INR 3.4 4.2 3.6 4.1 3.5 3.6 4.2 3.7 4.6 3.8 4.6 3.1   Notes 1x hold; justen  1x decr dose    fall  fall, APAP  decr appetite      Date 2/8 2/15 2/24 2/28 3/3 3/8 3/15 4/1 4/7  5/20 6/2   Total WeeklyDose 21mg 19mg 15mg 15mg 22mg 20mg 19 mg 20 mg 20 mg Non compliant 21mg 20 mg   INR 4.9 6.7 2.8 2.5 4.2 4.2 3.7 3.8 3.2  5.2 3.8   Notes Reduced appetite 1x decr   2x miss 2xmiss 1x boost 1x decr             Date 6/27 7/13 8/2 9/1 10/4 10/28 11/4 12/9 12/28  3/17/23 3/27   Total WeeklyDose  20mg 20 mg 20mg 20mg 17mg  20 mg 20 mg Non compliant 21 mg  20 mg   INR 2.9 3.4 3.5 3.1 4.2 2.4 3.5 3.1 5.0  3.2 3.4   Notes 1x miss     1x miss     Inc GLV      Date 4/12 4/15 4/19 5/17/23 6/2 6/20 7/17 8/9/23 08/17  10/23/23 11/3   Total WeeklyDose 20 mg 20 mg  20 mg  20 mg 20 mg 17 mg 20 mg 20 mg 21 mg Non compliant 20 mg 20 mg   INR 3.2 2.7 3.0 3.4 3.0 2.9 3.1 2.6 3.9  3.2 3.1   Notes  rec4/19    1x miss  Possible miss  1x boost        Date 11/10 11/17 12/06 12/13 12/22/23 1/5/2024 1/26/24 2/2/24 2/20 3/1/24 3/18/24 4/2/24   Total WeeklyDose 20 mg 21 mg 20 mg 17mg 20 mg 20mg 20 mg 17 mg 17 mg 20 mg 11 mg    11 mg   INR 2.9 3.4 3.4 2.9 3.2  2.9 3.3 2.8 2.9 3.1 3.0 1.8   Notes    1x miss Rec'd 12/26/23   1x miss  1 miss dose   Several   Missed doses     Date 4/5/2024 4/15 4/26 5/2 5/14/24 5/22 6/7/24 6/18 6/25 7/02 7/11 7/24/24   Total WeeklyDose 17 mg 20 mg 18 mg 21 mg 20 mg 17 mg 20 mg 21 mg 21 mg 21 mg 21 mg 21 mg   INR 4.0 3.4 2.8 4.3 3.8 3.2 2.5 3.2 2.9 3.0 2.4 3.1   Notes 2x boost   Missed dosex1 Rec'd 5/3  1x miss INC Vitamin K   Inc GLV   1x boost      Date 8/14/24 8/23 8/28/24 9/9/24 9/25 9/30 10/2/24 10/4 10/10 10/28/24 12/13 1/2   Total WeeklyDose 21 mg 21mg  24 mg  21 mg 21 mg 12mg  15 mg 15 mg 24 mg 21 mg 21 mg 21 mg   INR 1.8 2.2 2.9 3.2 3.2 1.5 2.0 2.4 3.6 3.1 3.3 3.1   Notes    Overdue  1x boost     1x BOOST      Bleeding   Rec 1/3     Date 2/21              Total WeeklyDose 21 mg              INR 2.9              Notes                 Phone Interview:  Tablet Strength: 2mg  Patient Contact Info: [phone] - LVM no voicemail  Verbal Release Authorization signed on 10/2/19 -- may speak with Thiago Gill (daughter, [phone]) or Pranay Piedra (son, [phone]); she requests that we LVM on one of her children's phones if she does not answer herself within about a 1/2 days time.   Lab Contact Info: [facility]    UNABLE TO GET IN CONTACT WITH THE PATIENT. PLEASE DISREGARD THE FOLLOWING PLAN UNTIL ABLE TO GET IN CONTACT WITH PATIENT/ PATIENT REPRESENTATIVE.  LVM 2/24      Plan:  1. INR slightly subtherapeutic today at 2.9 (goal 3.0-4.0). Instructed Ms. Piedra to continue warfarin 3 mg daily until recheck.   2. Repeat INR 3.05.25. Patient prefers testing Wednesdays.  3. Verbal information provided over the phone. Chase Piedra wrote down and RBV dosing instructions, expresses understanding by teach back, and has no further questions at this time

## 2025-02-24 LAB — INR PPP: 3.1

## 2025-02-24 NOTE — PROGRESS NOTES
Anticoagulation Clinic - Remote Progress Note  [facility]  Testing Frequency: weekly     Indication: mMVR (2008)  Referring Provider: Smitha (last galindo: 10/03/22)  Goal INR: 3.0-4.0  Current Drug Interactions: aspirin, paroxetine, spironolactone     Diet: GLV consistent 3/17/2023  Alcohol: none  Tobacco: none; quit 2008  OTC Pain Medication: APAP PRN     INR History:  Date 9/13 10/2 10/7 10/15 10/21 10/30 11/6 11/13 11/22 11/27 12/3 12/6   Total Weekly Dose 16mg 28mg 20mg 18mg 20mg 20mg 18mg 16mg 14mg 18mg 16mg 18mg   INR 1.98 7.1, 7.2  4.59 justen 3.31 3.18  3.1 4.4 3.7 3.3 2.69 4.1 2.2 2.9   Notes hosp adm clinic       HM training   decr GLV; Ensure ED; hematuria; 2x self-hold rocephin x1, keflex  keflex done 11/29        Date 12/13 12/17 12/24 1/2/20 1/8 1/15 1/21 1/30 2/5 2/19 3/4 3/18   Total Weekly Dose 18mg 20mg 20mg 20mg 20mg 20mg 21mg 20mg 20mg 20mg 20mg 20mg   INR 2.1 3.0 3.1 4.0 3.4 2.7 2.7 3.4 3.5 3.8 2.9 5.1   Notes   Ensure cooked spinach decr GLV; n/v   incr GLV 1x incr dose 1x incr dose     incr GLV? sick, decr appetite      Date 3/20 3/26 4/1 4/7 4/15 4/23 5/1 5/11 5/14 5/22 6/1 6/9   Total Weekly Dose 18mg 20mg 18mg 14mg 17mg 17mg 17mg 18mg? 20mg 20mg 19mg 19mg   INR 3.0 4.0 5.8 3.1 3.7 3.0 2.8 2.4 3.1 4.0 4.2 3.9   Notes 1x hold; Broccoli ensure x1/day   1x hold     decr GLV Miss?              Date 6/14 6/19 6/23 6/29 7/7 7/16 7/24 8/7 8/25 8/28 9/11 9/18   Total Weekly Dose 19mg 20mg? 20mg 19mg 19mg 16mg 19mg 19mg 19mg 14mg 16mg 20mg   INR 3.2 3.1 4.6 4.0 4.0 3.2 3.6 3.6 5.3 2.9 2.9 4.0   Notes   Self adj decr appetite   3x miss ensure 1x miss  No ensure          not eating 1x hold 1x miss        Date 10/3 10/20 10/27 11/1 11/11 11/19 11/25 12/3 12/11 12/17 12/22 12/29   Total Weekly Dose 20mg 19mg 21mg 20mg 20mg 21mg 19mg 20mg 20mg 19mg 17mg 18mg   INR 3.2 2.7 3.0 3.5 3.6 4.1 2.9 3.6 4.8 4.9 4.3 4.1   Notes Syncopal episode; decr vit K     recv'd 11/2; epistaxis   1x extra dose 1x decr  dose   no Ensure sick; decr appetite          Date 1/5/21 1/12 1/19 1/20 2/3 2/9 2/23 3/2 3/9 3/17 4/1 4/9   Total WeeklyDose 18mg 19mg 19mg 18mg 18mg 19mg 18mg 18mg 18mg 18mg 19mg 20mg   INR 3.3 4.3 4.9 2.89 justen  4.0 POCT 3.03 justen  2.9 POCT 3.7 3.5 4.2 2.9 2.9 2.0 3.7   Notes incr GLV extra 1x mis- dose; decr appetite 1x decr dose zero ensure x3d HM   incr EtOH incr GLV   incr GLV 2x incr dose      Date 4/30 5/7 5/18 5/26 6/7 6/24 6/28 7/2 7/11 7/20 8/3 8/17   Total WeeklyDose 20mg 21mg 21mg 21mg 18mg 6mg 13mg  22mg 21mg 21mg 21mg 21mg   INR 2.6 3.2 3.6 3.7 2.6 1.3 1.8 3.2 3.4 3.2 4.1 5.5   Notes paxil incr; decr VitK       1x miss [facility] admit, held for c'scope,  hemorrroids                  Date 8/18 8/26 9/7 9/17 10/8 11/2 11/18 11/21 12/2 12/13 12/21 1/13   Total WeeklyDose 18mg 21mg 20mg 21mg 21mg 21mg 21mg 20mg 21mg 21mg 21mg 21mg   INR 3.4 4.2 3.6 4.1 3.5 3.6 4.2 3.7 4.6 3.8 4.6 3.1   Notes 1x hold; justen   1x decr dose       fall   fall, APAP   decr appetite        Date 2/8 2/15 2/24 2/28 3/3 3/8 3/15 4/1 4/7   5/20 6/2   Total WeeklyDose 21mg 19mg 15mg 15mg 22mg 20mg 19 mg 20 mg 20 mg Non compliant 21mg 20 mg   INR 4.9 6.7 2.8 2.5 4.2 4.2 3.7 3.8 3.2   5.2 3.8   Notes Reduced appetite 1x decr   2x miss 2xmiss 1x boost 1x decr                    Date 6/27 7/13 8/2 9/1 10/4 10/28 11/4 12/9 12/28   3/17/23 3/27   Total WeeklyDose   20mg 20 mg 20mg 20mg 17mg   20 mg 20 mg Non compliant 21 mg  20 mg   INR 2.9 3.4 3.5 3.1 4.2 2.4 3.5 3.1 5.0   3.2 3.4   Notes 1x miss         1x miss         Inc GLV        Date 4/12 4/15 4/19 5/17/23 6/2 6/20 7/17 8/9/23 08/17   10/23/23 11/3   Total WeeklyDose 20 mg 20 mg  20 mg  20 mg 20 mg 17 mg 20 mg 20 mg 21 mg Non compliant 20 mg 20 mg   INR 3.2 2.7 3.0 3.4 3.0 2.9 3.1 2.6 3.9   3.2 3.1   Notes   rec4/19       1x miss   Possible miss  1x boost            Date 11/10 11/17 12/06 12/13 12/22/23 1/5/2024 1/26/24 2/2/24 2/20 3/1/24 3/18/24 4/2/24   Total WeeklyDose 20 mg 21 mg 20  mg 17mg 20 mg 20mg 20 mg 17 mg 17 mg 20 mg 11 mg     11 mg   INR 2.9 3.4 3.4 2.9 3.2 2.9 3.3 2.8 2.9 3.1 3.0 1.8   Notes       1x miss Rec'd 12/26/23     1x miss  1 miss dose     Several   Missed doses      Date 4/5/2024 4/15 4/26 5/2 5/14/24 5/22 6/7/24 6/18 6/25 7/02 7/11 7/24/24   Total WeeklyDose 17 mg 20 mg 18 mg 21 mg 20 mg 17 mg 20 mg 21 mg 21 mg 21 mg 21 mg 21 mg   INR 4.0 3.4 2.8 4.3 3.8 3.2 2.5 3.2 2.9 3.0 2.4 3.1   Notes 2x boost    Missed dosex1 Rec'd 5/3   1x miss INC Vitamin K    Inc GLV     1x boost       Date 8/14/24 8/23 8/28/24 9/9/24 9/25 9/30 10/2/24 10/4 10/10 10/28/24 12/13 1/2   Total WeeklyDose 21 mg 21mg  24 mg  21 mg 21 mg 12mg  15 mg 15 mg 24 mg 21 mg 21 mg 21 mg   INR 1.8 2.2 2.9 3.2 3.2 1.5 2.0 2.4 3.6 3.1 3.3 3.1   Notes       Overdue  1x boost       1x BOOST          Bleeding   Rec 1/3      Date 2/21 2/24                       Total WeeklyDose 21 mg  15 mg                       INR 2.9  3.1                       Notes                             Phone Interview:  Tablet Strength: 2mg  Patient Contact Info: [phone] - LVM no voicemail  Verbal Release Authorization signed on 10/2/19 -- may speak with Thiago Gill (daughter, [phone]) or Pranay Piedra (son, [phone]); she requests that we LVM on one of her children's phones if she does not answer herself within about a 1/2 days time.   Lab Contact Info: [facility]    Patient Findings    Positives: Signs/symptoms of bleeding   Negatives: Signs/symptoms of thrombosis, Laboratory test error suspected, Change in health, Change in alcohol use, Change in activity, Upcoming invasive procedure, Emergency department visit, Upcoming dental procedure, Missed doses, Extra doses, Change in medications, Change in diet/appetite, Hospital admission, Bruising, Other complaints   Comments: Patient reports she had significant rectal bleeding from internal hemorrhoid and held warfarin for 2 days. She says her PCP prescribed montelukast to  help with a cough, but she says she is unable to continue taking it because of side affects. Patient refuses to go to ED for rectal bleeds, as she does not want to be readmitted to the hospital. All other findings negative per patient.     Plan:  1. INR was slightly subtherapeutic 2/21 at 2.9 and patient reports it is therapeutic today at 3.1 (goal 3.0-4.0). Instructed Ms. Piedra to continue warfarin 3 mg daily until recheck.   2. Repeat INR 3/5/25. Patient prefers testing Wednesdays.  3. Verbal information provided over the phone. Chase Piedra wrote down and RBV dosing instructions, expresses understanding by teach back, and has no further questions at this time      Annie Ovalles CPhT   14:44 EST 2/24/2025    ILudy, [facility], have reviewed the note in full and agree with the assessment and plan.  02/24/25  14:50 EST

## 2025-02-25 ENCOUNTER — TELEPHONE (OUTPATIENT)
Dept: FAMILY MEDICINE CLINIC | Facility: CLINIC | Age: 75
End: 2025-02-25

## 2025-02-25 NOTE — TELEPHONE ENCOUNTER
Caller: Chase Piedra    Relationship: Self    Best call back number:  [phone] (Mobile)     What form or medical record are you requesting: STATEMENT SAYING PATIENT NEEDS HER DOG FOR AN EMOTIONAL SUPPORT ANIMAL   SHE IS  MOVING FROM SECOND FLOOR TO THE FIRST FLOOR IN HER APARTMENT COMPLEX     How would you like to receive the form or medical records (pick-up, mail, fax):   WILL  FROM THE OFFICE     Timeframe paperwork needed:  BY 3-3-25    Additional notes:  PLEASE CALL

## 2025-02-28 ENCOUNTER — TELEPHONE (OUTPATIENT)
Dept: FAMILY MEDICINE CLINIC | Facility: CLINIC | Age: 75
End: 2025-02-28
Payer: MEDICARE

## 2025-02-28 NOTE — TELEPHONE ENCOUNTER
HUB TO RELAY    LVM    Per Ivania Stover-     Please let patient know her CXR was stable, no changes from her last xray, no sign of pneumonia

## 2025-02-28 NOTE — TELEPHONE ENCOUNTER
Name: Chase Piedra      Relationship: Self      Best Callback Number: [phone]       HUB PROVIDED THE RELAY MESSAGE FROM THE OFFICE      PATIENT: VOICED UNDERSTANDING AND HAS NO FURTHER QUESTIONS AT THIS TIME    ADDITIONAL INFORMATION:

## 2025-03-07 ENCOUNTER — ANTICOAGULATION VISIT (OUTPATIENT)
Dept: PHARMACY | Facility: HOSPITAL | Age: 75
End: 2025-03-07
Payer: MEDICARE

## 2025-03-07 DIAGNOSIS — Z95.2 HX OF MITRAL VALVE REPLACEMENT WITH MECHANICAL VALVE: Primary | ICD-10-CM

## 2025-03-07 LAB — INR PPP: 3

## 2025-03-07 NOTE — PROGRESS NOTES
Anticoagulation Clinic - Remote Progress Note  [facility]  Testing Frequency: weekly     Indication: mMVR (2008)  Referring Provider: Smitha (last galindo: 10/03/22)  Goal INR: 3.0-4.0  Current Drug Interactions: aspirin, paroxetine, spironolactone     Diet: GLV consistent 3/17/2023  Alcohol: none  Tobacco: none; quit 2008  OTC Pain Medication: APAP PRN     INR History:  Date 9/13 10/2 10/7 10/15 10/21 10/30 11/6 11/13 11/22 11/27 12/3 12/6   Total Weekly Dose 16mg 28mg 20mg 18mg 20mg 20mg 18mg 16mg 14mg 18mg 16mg 18mg   INR 1.98 7.1, 7.2  4.59 justen 3.31 3.18  3.1 4.4 3.7 3.3 2.69 4.1 2.2 2.9   Notes hosp adm clinic       HM training   decr GLV; Ensure ED; hematuria; 2x self-hold rocephin x1, keflex  keflex done 11/29        Date 12/13 12/17 12/24 1/2/20 1/8 1/15 1/21 1/30 2/5 2/19 3/4 3/18   Total Weekly Dose 18mg 20mg 20mg 20mg 20mg 20mg 21mg 20mg 20mg 20mg 20mg 20mg   INR 2.1 3.0 3.1 4.0 3.4 2.7 2.7 3.4 3.5 3.8 2.9 5.1   Notes   Ensure cooked spinach decr GLV; n/v   incr GLV 1x incr dose 1x incr dose     incr GLV? sick, decr appetite      Date 3/20 3/26 4/1 4/7 4/15 4/23 5/1 5/11 5/14 5/22 6/1 6/9   Total Weekly Dose 18mg 20mg 18mg 14mg 17mg 17mg 17mg 18mg? 20mg 20mg 19mg 19mg   INR 3.0 4.0 5.8 3.1 3.7 3.0 2.8 2.4 3.1 4.0 4.2 3.9   Notes 1x hold; Broccoli ensure x1/day   1x hold     decr GLV Miss?              Date 6/14 6/19 6/23 6/29 7/7 7/16 7/24 8/7 8/25 8/28 9/11 9/18   Total Weekly Dose 19mg 20mg? 20mg 19mg 19mg 16mg 19mg 19mg 19mg 14mg 16mg 20mg   INR 3.2 3.1 4.6 4.0 4.0 3.2 3.6 3.6 5.3 2.9 2.9 4.0   Notes   Self adj decr appetite   3x miss ensure 1x miss  No ensure          not eating 1x hold 1x miss        Date 10/3 10/20 10/27 11/1 11/11 11/19 11/25 12/3 12/11 12/17 12/22 12/29   Total Weekly Dose 20mg 19mg 21mg 20mg 20mg 21mg 19mg 20mg 20mg 19mg 17mg 18mg   INR 3.2 2.7 3.0 3.5 3.6 4.1 2.9 3.6 4.8 4.9 4.3 4.1   Notes Syncopal episode; decr vit K     recv'd 11/2; epistaxis   1x extra dose 1x decr  dose   no Ensure sick; decr appetite          Date 1/5/21 1/12 1/19 1/20 2/3 2/9 2/23 3/2 3/9 3/17 4/1 4/9   Total WeeklyDose 18mg 19mg 19mg 18mg 18mg 19mg 18mg 18mg 18mg 18mg 19mg 20mg   INR 3.3 4.3 4.9 2.89 justen  4.0 POCT 3.03 justen  2.9 POCT 3.7 3.5 4.2 2.9 2.9 2.0 3.7   Notes incr GLV extra 1x mis- dose; decr appetite 1x decr dose zero ensure x3d HM   incr EtOH incr GLV   incr GLV 2x incr dose      Date 4/30 5/7 5/18 5/26 6/7 6/24 6/28 7/2 7/11 7/20 8/3 8/17   Total WeeklyDose 20mg 21mg 21mg 21mg 18mg 6mg 13mg  22mg 21mg 21mg 21mg 21mg   INR 2.6 3.2 3.6 3.7 2.6 1.3 1.8 3.2 3.4 3.2 4.1 5.5   Notes paxil incr; decr VitK       1x miss [facility] admit, held for c'scope,  hemorrroids                  Date 8/18 8/26 9/7 9/17 10/8 11/2 11/18 11/21 12/2 12/13 12/21 1/13   Total WeeklyDose 18mg 21mg 20mg 21mg 21mg 21mg 21mg 20mg 21mg 21mg 21mg 21mg   INR 3.4 4.2 3.6 4.1 3.5 3.6 4.2 3.7 4.6 3.8 4.6 3.1   Notes 1x hold; justen   1x decr dose       fall   fall, APAP   decr appetite        Date 2/8 2/15 2/24 2/28 3/3 3/8 3/15 4/1 4/7   5/20 6/2   Total WeeklyDose 21mg 19mg 15mg 15mg 22mg 20mg 19 mg 20 mg 20 mg Non compliant 21mg 20 mg   INR 4.9 6.7 2.8 2.5 4.2 4.2 3.7 3.8 3.2   5.2 3.8   Notes Reduced appetite 1x decr   2x miss 2xmiss 1x boost 1x decr                    Date 6/27 7/13 8/2 9/1 10/4 10/28 11/4 12/9 12/28   3/17/23 3/27   Total WeeklyDose   20mg 20 mg 20mg 20mg 17mg   20 mg 20 mg Non compliant 21 mg  20 mg   INR 2.9 3.4 3.5 3.1 4.2 2.4 3.5 3.1 5.0   3.2 3.4   Notes 1x miss         1x miss         Inc GLV        Date 4/12 4/15 4/19 5/17/23 6/2 6/20 7/17 8/9/23 08/17   10/23/23 11/3   Total WeeklyDose 20 mg 20 mg  20 mg  20 mg 20 mg 17 mg 20 mg 20 mg 21 mg Non compliant 20 mg 20 mg   INR 3.2 2.7 3.0 3.4 3.0 2.9 3.1 2.6 3.9   3.2 3.1   Notes   rec4/19       1x miss   Possible miss  1x boost            Date 11/10 11/17 12/06 12/13 12/22/23 1/5/2024 1/26/24 2/2/24 2/20 3/1/24 3/18/24 4/2/24   Total WeeklyDose 20 mg 21 mg 20  mg 17mg 20 mg 20mg 20 mg 17 mg 17 mg 20 mg 11 mg     11 mg   INR 2.9 3.4 3.4 2.9 3.2 2.9 3.3 2.8 2.9 3.1 3.0 1.8   Notes       1x miss Rec'd 12/26/23     1x miss  1 miss dose     Several   Missed doses      Date 4/5/2024 4/15 4/26 5/2 5/14/24 5/22 6/7/24 6/18 6/25 7/02 7/11 7/24/24   Total WeeklyDose 17 mg 20 mg 18 mg 21 mg 20 mg 17 mg 20 mg 21 mg 21 mg 21 mg 21 mg 21 mg   INR 4.0 3.4 2.8 4.3 3.8 3.2 2.5 3.2 2.9 3.0 2.4 3.1   Notes 2x boost    Missed dosex1 Rec'd 5/3   1x miss INC Vitamin K    Inc GLV     1x boost       Date 8/14/24 8/23 8/28/24 9/9/24 9/25 9/30 10/2/24 10/4 10/10 10/28/24 12/13 1/2   Total WeeklyDose 21 mg 21mg  24 mg  21 mg 21 mg 12mg  15 mg 15 mg 24 mg 21 mg 21 mg 21 mg   INR 1.8 2.2 2.9 3.2 3.2 1.5 2.0 2.4 3.6 3.1 3.3 3.1   Notes       Overdue  1x boost       1x BOOST          Bleeding   Rec 1/3      Date 2/21  2/24  3/7                     Total WeeklyDose 21 mg  15 mg  21 mg                     INR 2.9  3.1  3.0                     Notes     Vomiting,  sick                       Phone Interview:  Tablet Strength: 2mg  Patient Contact Info: [phone] - LVM no voicemail  Verbal Release Authorization signed on 10/2/19 -- may speak with Thiago Gill (daughter, [phone]) or Pranay Piedra (son, [phone]); she requests that we LVM on one of her children's phones if she does not answer herself within about a 1/2 days time.   Lab Contact Info: [facility]    Patient Findings  Positives: Change in health, Change in diet/appetite   Negatives: Signs/symptoms of thrombosis, Signs/symptoms of bleeding, Laboratory test error suspected, Change in alcohol use, Change in activity, Upcoming invasive procedure, Emergency department visit, Upcoming dental procedure, Missed doses, Extra doses, Change in medications, Hospital admission, Bruising, Other complaints   Comments: Feeling sick since waking up yesterday - has had vomiting and having chills since yesterday. Not eating much as unable to tolerate  much. Also has a headache which is unusual for her. No medication changes. Moving apartments Monday and unable to check then, should be able to later in the week. No more rectal bleeding from internal hemorrhoid last encounter.       Plan:  1. INR was therapeutic today at 3.0 (goal 3.0-4.0). Instructed Ms. Piedra to continue warfarin 3 mg daily until recheck.   2. Repeat INR Wednesday 3/12 due to current illness. Patient prefers testing Wednesdays.  3. Verbal information provided over the phone. Jaybhaviknika Piedra wrote down and RBV dosing instructions, expresses understanding by teach back, and has no further questions at this time    Suraj Whitfield, PharmD, BCPS  3/7/2025  15:22 EST

## 2025-03-12 ENCOUNTER — OFFICE VISIT (OUTPATIENT)
Dept: PULMONOLOGY | Facility: CLINIC | Age: 75
End: 2025-03-12
Payer: MEDICARE

## 2025-03-12 VITALS
DIASTOLIC BLOOD PRESSURE: 78 MMHG | TEMPERATURE: 98 F | OXYGEN SATURATION: 90 % | HEART RATE: 89 BPM | WEIGHT: 140 LBS | BODY MASS INDEX: 21.22 KG/M2 | HEIGHT: 68 IN | SYSTOLIC BLOOD PRESSURE: 122 MMHG

## 2025-03-12 DIAGNOSIS — Z95.2 HX OF MITRAL VALVE REPLACEMENT WITH MECHANICAL VALVE: ICD-10-CM

## 2025-03-12 DIAGNOSIS — J96.11 CHRONIC RESPIRATORY FAILURE WITH HYPOXIA: Primary | ICD-10-CM

## 2025-03-12 DIAGNOSIS — J44.9 CHRONIC OBSTRUCTIVE PULMONARY DISEASE, UNSPECIFIED COPD TYPE: ICD-10-CM

## 2025-03-12 DIAGNOSIS — J44.9 COPD MIXED TYPE: ICD-10-CM

## 2025-03-12 DIAGNOSIS — Z87.891 FORMER SMOKER: ICD-10-CM

## 2025-03-12 RX ORDER — TIOTROPIUM BROMIDE AND OLODATEROL 3.124; 2.736 UG/1; UG/1
2 SPRAY, METERED RESPIRATORY (INHALATION) DAILY
Qty: 4 G | Refills: 11 | Status: SHIPPED | OUTPATIENT
Start: 2025-03-12

## 2025-03-12 NOTE — PROGRESS NOTES
PULMONARY  NOTE    Chief Complaint     COPD, former smoker, chronic respiratory failure    History of Present Illness     74-year-old female returns today for follow-up  I initially saw her 8/28/2024  Previously followed by Dr. Gifford    She has stage II, moderate chronic obstructive pulmonary disease  She is intolerant of ICS  She has been on Stiolto with albuterol  She does not use albuterol very often    She has chronic hypoxic respiratory failure  She has a portable tank system and a stationary concentrator  Her [employer] is [employer]    She has valvular heart disease with a prosthetic mitral valve  She is chronically anticoagulated on warfarin    Through the winter she had increasing cough  No steroids or antibiotics  She was started on Singulair which seemed to help her symptoms some but she had to stop it because it made her dizzy    Patient Active Problem List   Diagnosis    Fracture, Colles, left, closed    Subtherapeutic international normalized ratio (INR)    Malfunction of mitral prosthetic valve    Fracture of surgical neck of humerus    Syncope, near    Hx of mitral valve repair    History of mitral valve prosthesis    Malfunction of mitral prosthetic valve, subsequent encounter    Hx of mitral valve replacement with mechanical valve [Z95.2]    Anxiety    Thoracic compression fracture, closed, initial encounter    Hypoxia    Essential hypertension    Depression    Rash    Chronic right shoulder pain    Painless rectal bleeding    Frequency of urination    Coronary artery disease involving native coronary artery of native heart without angina pectoris    Moderate (Stage II) COPD    Chronic respiratory failure with hypoxia    Former smoker (Stopped 2008)      No Known Allergies    Current Outpatient Medications:     O2 (OXYGEN), Inhale 3 L/min 1 (One) Time., Disp: , Rfl:     PARoxetine (Paxil) 30 MG tablet, Take 1 tablet by mouth Every Morning., Disp: 90 tablet, Rfl: 3    pravastatin (PRAVACHOL) 80 MG  "tablet, Take 1 tablet by mouth Every Night., Disp: 90 tablet, Rfl: 3    tiotropium bromide-olodaterol (Stiolto Respimat) 2.5-2.5 MCG/ACT aerosol solution inhaler, Inhale 2 puffs Daily., Disp: 4 g, Rfl: 11    Ventolin  (90 Base) MCG/ACT inhaler, Inhale 2 puffs Every 4 (Four) Hours As Needed for Wheezing or Shortness of Air., Disp: 18 g, Rfl: 5    warfarin (COUMADIN) 2 MG tablet, TAKE 1 TO 1 & 1/2 (ONE & ONE-HALF) TABLETS BY MOUTH ONCE DAILY OR AS DIRECTED BT THE ANTICOAGULATION CLINIC, Disp: 135 tablet, Rfl: 0    montelukast (Singulair) 10 MG tablet, Take 1 tablet by mouth Every Night. (Patient not taking: Reported on 3/12/2025), Disp: 30 tablet, Rfl: 5  MEDICATION LIST AND ALLERGIES REVIEWED.    Family History   Problem Relation Age of Onset    No Known Problems Mother     Heart failure Father      Social History     Tobacco Use    Smoking status: Former     Current packs/day: 0.00     Average packs/day: 1 pack/day for 43.0 years (43.0 ttl pk-yrs)     Types: Cigarettes     Start date:      Quit date:      Years since quittin.2     Passive exposure: Past    Smokeless tobacco: Never   Vaping Use    Vaping status: Never Used   Substance Use Topics    Alcohol use: No    Drug use: No     Social History     Social History Narrative    Former smoker, stopped      was a smoker as well     FAMILY AND SOCIAL HISTORY REVIEWED.    Review of Systems  IF PRESENT REFER TO SCANNED ROS SHEET FROM SAME DATE  OTHERWISE ROS OBTAINED AND NON-CONTRIBUTORY OVER HPI.    /78   Pulse 89   Temp 98 °F (36.7 °C) (Infrared)   Ht 172.7 cm (67.99\")   Wt 63.5 kg (140 lb)   SpO2 90% Comment: 3L of O2  BMI 21.29 kg/m²   Physical Exam  Vitals and nursing note reviewed.   Constitutional:       General: She is not in acute distress.     Appearance: She is well-developed. She is not diaphoretic.   HENT:      Head: Normocephalic and atraumatic.   Neck:      Thyroid: No thyromegaly.   Cardiovascular:      Rate and " Rhythm: Normal rate and regular rhythm.      Heart sounds: Normal heart sounds. No murmur heard.  Pulmonary:      Effort: Pulmonary effort is normal.      Breath sounds: Normal breath sounds. No stridor.   Lymphadenopathy:      Cervical: No cervical adenopathy.      Upper Body:      Right upper body: No supraclavicular or epitrochlear adenopathy.      Left upper body: No supraclavicular or epitrochlear adenopathy.   Skin:     General: Skin is warm and dry.   Neurological:      Mental Status: She is alert and oriented to person, place, and time.   Psychiatric:         Behavior: Behavior normal.         Results     Immunization History   Administered Date(s) Administered    COVID-19 (PFIZER) Purple Cap Monovalent 03/19/2021, 04/09/2021, 11/02/2021    Fluzone High-Dose 65+yrs 10/26/2022    Pneumococcal Conjugate 13-Valent (PCV13) 10/15/2020    Pneumococcal Conjugate 20-Valent (PCV20) 09/15/2022     Problem List       ICD-10-CM ICD-9-CM   1. Chronic respiratory failure with hypoxia  J96.11 518.83     799.02   2. Former smoker (Stopped 2008)  Z87.891 V15.82   3. Moderate (Stage II) COPD  J44.9 496   4. Hx of mitral valve replacement with mechanical valve [Z95.2]  Z95.2 V43.3       Discussion     She has had worsening cough during the winter but improved somewhat now  She has been intolerant of montelukast  Have suggested she try an OTC antihistamine  We discussed potential side effects    We discussed medical therapy for her COPD  She wants to remain on Stiolto and I will make sure that gets refilled  We discussed ensifentrine but she is not interested in adding a new medication at this time    We again discussed LDCT screening  She does not meet guidelines  She is not interested in having any additional chest imaging    Have encouraged continued use of supplemental oxygen 24 hours a day  She got up POC from her brother but the battery is not good.  I suggested she contact the company because it has an [employer] and she  should be able to get a replacement    I will plan to see her back before the end of the year or earlier if there are any problems in the meantime    This visit represents an established relationship with whom the provider is providing ongoing longitudinal care related to serious and/or complex conditions    Moderate level of Medical Decision Making complexity based on 2 or more chronic stable illnesses and an independent review of test results and/or prescription drug management.    Axel Rivera MD  Note electronically signed    CC: Ivania Stover, MAHI

## 2025-03-28 ENCOUNTER — OFFICE VISIT (OUTPATIENT)
Dept: CARDIOLOGY | Facility: CLINIC | Age: 75
End: 2025-03-28
Payer: MEDICARE

## 2025-03-28 ENCOUNTER — RESULTS FOLLOW-UP (OUTPATIENT)
Dept: CARDIOLOGY | Facility: CLINIC | Age: 75
End: 2025-03-28
Payer: MEDICARE

## 2025-03-28 ENCOUNTER — HOSPITAL ENCOUNTER (OUTPATIENT)
Facility: HOSPITAL | Age: 75
Discharge: HOME | End: 2025-03-28
Payer: MEDICARE

## 2025-03-28 VITALS
HEIGHT: 68 IN | SYSTOLIC BLOOD PRESSURE: 142 MMHG | WEIGHT: 141.3 LBS | BODY MASS INDEX: 21.41 KG/M2 | OXYGEN SATURATION: 92 % | HEART RATE: 85 BPM | DIASTOLIC BLOOD PRESSURE: 66 MMHG

## 2025-03-28 VITALS — BODY MASS INDEX: 21.22 KG/M2 | HEIGHT: 68 IN | WEIGHT: 139.99 LBS

## 2025-03-28 DIAGNOSIS — I10 ESSENTIAL HYPERTENSION: Chronic | ICD-10-CM

## 2025-03-28 DIAGNOSIS — Z95.2 HX OF MITRAL VALVE REPLACEMENT WITH MECHANICAL VALVE: ICD-10-CM

## 2025-03-28 DIAGNOSIS — I25.10 CORONARY ARTERY DISEASE INVOLVING NATIVE CORONARY ARTERY OF NATIVE HEART WITHOUT ANGINA PECTORIS: Primary | Chronic | ICD-10-CM

## 2025-03-28 DIAGNOSIS — R55 SYNCOPE, NEAR: ICD-10-CM

## 2025-03-28 DIAGNOSIS — I10 ESSENTIAL HYPERTENSION: ICD-10-CM

## 2025-03-28 DIAGNOSIS — I50.32 CHRONIC HEART FAILURE WITH PRESERVED EJECTION FRACTION (HFPEF): Chronic | ICD-10-CM

## 2025-03-28 DIAGNOSIS — Z95.2 HX OF MITRAL VALVE REPLACEMENT WITH MECHANICAL VALVE: Chronic | ICD-10-CM

## 2025-03-28 LAB
AORTIC DIMENSIONLESS INDEX: 0.66 (DI)
ASCENDING AORTA: 3.4 CM
AV MEAN PRESS GRAD SYS DOP V1V2: 3 MMHG
AV VMAX SYS DOP: 118.3 CM/SEC
BH CV ECHO MEAS - AO MAX PG: 5.6 MMHG
BH CV ECHO MEAS - AO ROOT DIAM: 3.4 CM
BH CV ECHO MEAS - AO V2 VTI: 21.7 CM
BH CV ECHO MEAS - AVA(I,D): 2.06 CM2
BH CV ECHO MEAS - EDV(CUBED): 117.6 ML
BH CV ECHO MEAS - EDV(MOD-SP2): 66.5 ML
BH CV ECHO MEAS - EDV(MOD-SP4): 108 ML
BH CV ECHO MEAS - EF(MOD-SP2): 57.4 %
BH CV ECHO MEAS - EF(MOD-SP4): 62.3 %
BH CV ECHO MEAS - ESV(CUBED): 42.9 ML
BH CV ECHO MEAS - ESV(MOD-SP2): 28.3 ML
BH CV ECHO MEAS - ESV(MOD-SP4): 40.7 ML
BH CV ECHO MEAS - FS: 28.6 %
BH CV ECHO MEAS - IVS/LVPW: 1.3 CM
BH CV ECHO MEAS - IVSD: 1.3 CM
BH CV ECHO MEAS - LA DIMENSION: 4.2 CM
BH CV ECHO MEAS - LAT PEAK E' VEL: 10.7 CM/SEC
BH CV ECHO MEAS - LV DIASTOLIC VOL/BSA (35-75): 61.9 CM2
BH CV ECHO MEAS - LV MASS(C)D: 213.3 GRAMS
BH CV ECHO MEAS - LV MAX PG: 2.06 MMHG
BH CV ECHO MEAS - LV MEAN PG: 1 MMHG
BH CV ECHO MEAS - LV SYSTOLIC VOL/BSA (12-30): 23.3 CM2
BH CV ECHO MEAS - LV V1 MAX: 71.8 CM/SEC
BH CV ECHO MEAS - LV V1 VTI: 14.3 CM
BH CV ECHO MEAS - LVIDD: 4.9 CM
BH CV ECHO MEAS - LVIDS: 3.5 CM
BH CV ECHO MEAS - LVOT AREA: 3.1 CM2
BH CV ECHO MEAS - LVOT DIAM: 2 CM
BH CV ECHO MEAS - LVPWD: 1 CM
BH CV ECHO MEAS - MED PEAK E' VEL: 7.1 CM/SEC
BH CV ECHO MEAS - MV A MAX VEL: 114 CM/SEC
BH CV ECHO MEAS - MV DEC SLOPE: 337 CM/SEC2
BH CV ECHO MEAS - MV DEC TIME: 0.24 SEC
BH CV ECHO MEAS - MV E MAX VEL: 80.5 CM/SEC
BH CV ECHO MEAS - MV E/A: 0.71
BH CV ECHO MEAS - MV MAX PG: 10.2 MMHG
BH CV ECHO MEAS - MV MEAN PG: 3 MMHG
BH CV ECHO MEAS - MV V2 VTI: 34.4 CM
BH CV ECHO MEAS - MVA(VTI): 1.3 CM2
BH CV ECHO MEAS - PA ACC TIME: 0.11 SEC
BH CV ECHO MEAS - PA V2 MAX: 80.2 CM/SEC
BH CV ECHO MEAS - SV(LVOT): 44.8 ML
BH CV ECHO MEAS - SV(MOD-SP2): 38.2 ML
BH CV ECHO MEAS - SV(MOD-SP4): 67.3 ML
BH CV ECHO MEAS - SVI(LVOT): 25.6 ML/M2
BH CV ECHO MEAS - SVI(MOD-SP2): 21.9 ML/M2
BH CV ECHO MEAS - SVI(MOD-SP4): 38.6 ML/M2
BH CV ECHO MEAS - TAPSE (>1.6): 1.42 CM
BH CV ECHO MEASUREMENTS AVERAGE E/E' RATIO: 9.04
BH CV VAS BP LEFT ARM: NORMAL MMHG
BH CV XLRA - RV BASE: 2.6 CM
BH CV XLRA - RV LENGTH: 7.6 CM
BH CV XLRA - RV MID: 2.12 CM
BH CV XLRA - TDI S': 10 CM/SEC
IVRT: 106 MS
LEFT ATRIUM VOLUME INDEX: 28.6 ML/M2
LV EF BIPLANE MOD: 60.7 %

## 2025-03-28 PROCEDURE — 93306 TTE W/DOPPLER COMPLETE: CPT

## 2025-03-28 RX ORDER — ASPIRIN 81 MG/1
81 TABLET ORAL DAILY
Qty: 30 TABLET | Refills: 3 | Status: SHIPPED | OUTPATIENT
Start: 2025-03-28

## 2025-03-28 NOTE — ASSESSMENT & PLAN NOTE
Diagnoses listed in her discharge summary from 2019 but patient unaware of this diagnosis.  EF normal  No need for diuretics at this time.  May consider spironolactone if blood pressures remain elevated.

## 2025-03-28 NOTE — ASSESSMENT & PLAN NOTE
Mild to moderate disease on cath in 2019 in the setting of a false MI.  Patient is not symptomatic with any anginal chest pain.  She does have a lot of baseline dyspnea with COPD that confounds her symptoms.  Has noticed a recent worsening in her dyspnea.  Unclear whether this is from deconditioning or anginal equivalent.  Suspect mostly from COPD and deconditioning.  Patient is very sedentary at baseline.  ECG today shows no acute ischemic changes or old infarct.  Relatively unremarkable.  Proposed stress test but both she and sister feel that this is not necessary for now.  She plans to become more active over the coming months and will let us know if he has significant limitations.  Will start aspirin and continue pravastatin  Plan to reassess her symptoms at her next visit.  Advised that patient call the office if her symptoms are very limiting and would order stress test at that time    Orders:    ECG 12 Lead    aspirin 81 MG EC tablet; Take 1 tablet by mouth Daily.

## 2025-03-28 NOTE — ASSESSMENT & PLAN NOTE
History of MVR in 2008 for mitral valve prolapse  Prelim review of echo done today shows normal valvular function  I do not think her dyspnea is related to her valve at this time  Reports that her INR goal was previously 3-4.  I think that this is a bit too high.  3-3.5 should suffice.  Does have occasional hematochezia with known hemorrhoid  Continue following with warfarin clinic weekly

## 2025-03-28 NOTE — ASSESSMENT & PLAN NOTE
Blood pressure is mildly elevated today, probably sufficient control for age.  Would target at least less than 150/90  Currently on no medications.  Will monitor
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
## 2025-04-02 ENCOUNTER — TELEPHONE (OUTPATIENT)
Dept: PHARMACY | Facility: HOSPITAL | Age: 75
End: 2025-04-02

## 2025-04-02 NOTE — TELEPHONE ENCOUNTER
Overdue INR Reminder Documentation    Attempted to call the patient for an INR reminder.     This is the First attempt. . WillCall again in 3 days.     LVM by hanging up shortly after the beep since note is not clear on whether patient wants voicemails left or not. Patient due to recheck 3/12.     Keith Michel   [facility]  4/2/2025 12:01 EDT

## 2025-04-08 LAB — INR PPP: 3.1

## 2025-04-09 ENCOUNTER — TELEPHONE (OUTPATIENT)
Dept: PHARMACY | Facility: HOSPITAL | Age: 75
End: 2025-04-09

## 2025-04-09 NOTE — TELEPHONE ENCOUNTER
Overdue INR Reminder Documentation    Attempted to call the patient for an INR reminder.     This is the Second attempt. . WillCall again in 3 days.     LVM for patient to call back.       Keith Michel   [facility]  4/9/2025 10:44 EDT

## 2025-04-11 ENCOUNTER — ANTICOAGULATION VISIT (OUTPATIENT)
Dept: PHARMACY | Facility: HOSPITAL | Age: 75
End: 2025-04-11
Payer: MEDICARE

## 2025-04-11 DIAGNOSIS — Z95.2 HX OF MITRAL VALVE REPLACEMENT WITH MECHANICAL VALVE: Primary | ICD-10-CM

## 2025-04-11 NOTE — PROGRESS NOTES
Anticoagulation Clinic - Remote Progress Note  [facility]  Testing Frequency: weekly     Indication: mMVR (2008)  Referring Provider: Smitha (last galindo: 10/03/22)  Goal INR: 3.0-4.0  Current Drug Interactions: aspirin, paroxetine, spironolactone     Diet: GLV consistent 3/17/2023  Alcohol: none  Tobacco: none; quit 2008  OTC Pain Medication: APAP PRN     INR History:  Date 9/13 10/2 10/7 10/15 10/21 10/30 11/6 11/13 11/22 11/27 12/3 12/6   Total Weekly Dose 16mg 28mg 20mg 18mg 20mg 20mg 18mg 16mg 14mg 18mg 16mg 18mg   INR 1.98 7.1, 7.2  4.59 justen 3.31 3.18  3.1 4.4 3.7 3.3 2.69 4.1 2.2 2.9   Notes hosp adm clinic       HM training   decr GLV; Ensure ED; hematuria; 2x self-hold rocephin x1, keflex  keflex done 11/29        Date 12/13 12/17 12/24 1/2/20 1/8 1/15 1/21 1/30 2/5 2/19 3/4 3/18   Total Weekly Dose 18mg 20mg 20mg 20mg 20mg 20mg 21mg 20mg 20mg 20mg 20mg 20mg   INR 2.1 3.0 3.1 4.0 3.4 2.7 2.7 3.4 3.5 3.8 2.9 5.1   Notes   Ensure cooked spinach decr GLV; n/v   incr GLV 1x incr dose 1x incr dose     incr GLV? sick, decr appetite      Date 3/20 3/26 4/1 4/7 4/15 4/23 5/1 5/11 5/14 5/22 6/1 6/9   Total Weekly Dose 18mg 20mg 18mg 14mg 17mg 17mg 17mg 18mg? 20mg 20mg 19mg 19mg   INR 3.0 4.0 5.8 3.1 3.7 3.0 2.8 2.4 3.1 4.0 4.2 3.9   Notes 1x hold; Broccoli ensure x1/day   1x hold     decr GLV Miss?              Date 6/14 6/19 6/23 6/29 7/7 7/16 7/24 8/7 8/25 8/28 9/11 9/18   Total Weekly Dose 19mg 20mg? 20mg 19mg 19mg 16mg 19mg 19mg 19mg 14mg 16mg 20mg   INR 3.2 3.1 4.6 4.0 4.0 3.2 3.6 3.6 5.3 2.9 2.9 4.0   Notes   Self adj decr appetite   3x miss ensure 1x miss  No ensure          not eating 1x hold 1x miss        Date 10/3 10/20 10/27 11/1 11/11 11/19 11/25 12/3 12/11 12/17 12/22 12/29   Total Weekly Dose 20mg 19mg 21mg 20mg 20mg 21mg 19mg 20mg 20mg 19mg 17mg 18mg   INR 3.2 2.7 3.0 3.5 3.6 4.1 2.9 3.6 4.8 4.9 4.3 4.1   Notes Syncopal episode; decr vit K     recv'd 11/2; epistaxis   1x extra dose 1x decr  dose   no Ensure sick; decr appetite          Date 1/5/21 1/12 1/19 1/20 2/3 2/9 2/23 3/2 3/9 3/17 4/1 4/9   Total WeeklyDose 18mg 19mg 19mg 18mg 18mg 19mg 18mg 18mg 18mg 18mg 19mg 20mg   INR 3.3 4.3 4.9 2.89 justen  4.0 POCT 3.03 justen  2.9 POCT 3.7 3.5 4.2 2.9 2.9 2.0 3.7   Notes incr GLV extra 1x mis- dose; decr appetite 1x decr dose zero ensure x3d HM   incr EtOH incr GLV   incr GLV 2x incr dose      Date 4/30 5/7 5/18 5/26 6/7 6/24 6/28 7/2 7/11 7/20 8/3 8/17   Total WeeklyDose 20mg 21mg 21mg 21mg 18mg 6mg 13mg  22mg 21mg 21mg 21mg 21mg   INR 2.6 3.2 3.6 3.7 2.6 1.3 1.8 3.2 3.4 3.2 4.1 5.5   Notes paxil incr; decr VitK       1x miss [facility] admit, held for c'scope,  hemorrroids                  Date 8/18 8/26 9/7 9/17 10/8 11/2 11/18 11/21 12/2 12/13 12/21 1/13   Total WeeklyDose 18mg 21mg 20mg 21mg 21mg 21mg 21mg 20mg 21mg 21mg 21mg 21mg   INR 3.4 4.2 3.6 4.1 3.5 3.6 4.2 3.7 4.6 3.8 4.6 3.1   Notes 1x hold; justen   1x decr dose       fall   fall, APAP   decr appetite        Date 2/8 2/15 2/24 2/28 3/3 3/8 3/15 4/1 4/7   5/20 6/2   Total WeeklyDose 21mg 19mg 15mg 15mg 22mg 20mg 19 mg 20 mg 20 mg Non compliant 21mg 20 mg   INR 4.9 6.7 2.8 2.5 4.2 4.2 3.7 3.8 3.2   5.2 3.8   Notes Reduced appetite 1x decr   2x miss 2xmiss 1x boost 1x decr                    Date 6/27 7/13 8/2 9/1 10/4 10/28 11/4 12/9 12/28   3/17/23 3/27   Total WeeklyDose   20mg 20 mg 20mg 20mg 17mg   20 mg 20 mg Non compliant 21 mg  20 mg   INR 2.9 3.4 3.5 3.1 4.2 2.4 3.5 3.1 5.0   3.2 3.4   Notes 1x miss         1x miss         Inc GLV        Date 4/12 4/15 4/19 5/17/23 6/2 6/20 7/17 8/9/23 08/17   10/23/23 11/3   Total WeeklyDose 20 mg 20 mg  20 mg  20 mg 20 mg 17 mg 20 mg 20 mg 21 mg Non compliant 20 mg 20 mg   INR 3.2 2.7 3.0 3.4 3.0 2.9 3.1 2.6 3.9   3.2 3.1   Notes   rec4/19       1x miss   Possible miss  1x boost            Date 11/10 11/17 12/06 12/13 12/22/23 1/5/2024 1/26/24 2/2/24 2/20 3/1/24 3/18/24 4/2/24   Total WeeklyDose 20 mg 21 mg 20  mg 17mg 20 mg 20mg 20 mg 17 mg 17 mg 20 mg 11 mg     11 mg   INR 2.9 3.4 3.4 2.9 3.2 2.9 3.3 2.8 2.9 3.1 3.0 1.8   Notes       1x miss Rec'd 12/26/23     1x miss  1 miss dose     Several   Missed doses      Date 4/5/2024 4/15 4/26 5/2 5/14/24 5/22 6/7/24 6/18 6/25 7/02 7/11 7/24/24   Total WeeklyDose 17 mg 20 mg 18 mg 21 mg 20 mg 17 mg 20 mg 21 mg 21 mg 21 mg 21 mg 21 mg   INR 4.0 3.4 2.8 4.3 3.8 3.2 2.5 3.2 2.9 3.0 2.4 3.1   Notes 2x boost    Missed dosex1 Rec'd 5/3   1x miss INC Vitamin K    Inc GLV     1x boost       Date 8/14/24 8/23 8/28/24 9/9/24 9/25 9/30 10/2/24 10/4 10/10 10/28/24 12/13 1/2   Total WeeklyDose 21 mg 21mg  24 mg  21 mg 21 mg 12mg  15 mg 15 mg 24 mg 21 mg 21 mg 21 mg   INR 1.8 2.2 2.9 3.2 3.2 1.5 2.0 2.4 3.6 3.1 3.3 3.1   Notes       Overdue  1x boost       1x BOOST          Bleeding   Rec 1/3      Date 2/21  2/24  3/7  4/11                   Total WeeklyDose 21 mg  15 mg  21 mg  21 mg                   INR 2.9  3.1  3.0  3.1                   Notes     Vomiting,  sick                       Phone Interview:  Tablet Strength: 2mg  Patient Contact Info: [phone] - LVM no voicemail  Verbal Release Authorization signed on 10/2/19 -- may speak with Thiago Gill (daughter, [phone]) or Pranay Piedra (son, [phone]); she requests that we LVM on one of her children's phones if she does not answer herself within about a 1/2 days time.   Lab Contact Info: [facility]    Patient Findings    Negatives: Signs/symptoms of thrombosis, Signs/symptoms of bleeding, Laboratory test error suspected, Change in health, Change in alcohol use, Change in activity, Upcoming invasive procedure, Emergency department visit, Upcoming dental procedure, Missed doses, Extra doses, Change in medications, Change in diet/appetite, Hospital admission, Bruising, Other complaints   Comments: All findings negative per patient         Plan:  1. INR was therapeutic 4/8 at 3.0 (goal 3.0-4.0). Spoke with patient today.  Instructed Ms. Piedra to continue warfarin 3 mg daily until recheck.   2. Repeat INR 5/6/25   3. Verbal information provided over the phone. Chase Piedra wrote down and RBV dosing instructions, expresses understanding by teach back, and has no further questions at this time    Evonne Weaver, PharmD  4/11/2025  15:48 EDT                 No

## 2025-05-14 ENCOUNTER — TELEPHONE (OUTPATIENT)
Dept: PHARMACY | Facility: HOSPITAL | Age: 75
End: 2025-05-14

## 2025-05-14 NOTE — TELEPHONE ENCOUNTER
Overdue INR Reminder Documentation    Attempted to call the patient for an INR reminder.     This is the First attempt. . WillCall again in 3 days. LVM for patient to call back. Patient due to recheck 5/6.       Keith Michel   [facility]  5/14/2025 09:33 EDT

## 2025-05-21 ENCOUNTER — TELEPHONE (OUTPATIENT)
Dept: PHARMACY | Facility: HOSPITAL | Age: 75
End: 2025-05-21

## 2025-05-21 NOTE — TELEPHONE ENCOUNTER
Overdue INR Reminder Documentation    Attempted to call the patient for an INR reminder.     This is the Second attempt. . WillCall again in 3 days. TORREY.      Keith Michel   [facility]  5/21/2025 09:12 EDT

## 2025-05-28 ENCOUNTER — TELEPHONE (OUTPATIENT)
Dept: PHARMACY | Facility: HOSPITAL | Age: 75
End: 2025-05-28

## 2025-05-28 NOTE — TELEPHONE ENCOUNTER
Overdue INR Reminder Documentation    Attempted to call the patient for an INR reminder.     This is the Third attempt.. WillSenzackery First Letter 5/29 if results not received. JAMISON.      Keith Michel   [facility]  5/28/2025 11:54 EDT

## 2025-06-04 DIAGNOSIS — Z95.2 HX OF MITRAL VALVE REPLACEMENT WITH MECHANICAL VALVE: ICD-10-CM

## 2025-06-04 RX ORDER — WARFARIN SODIUM 2 MG/1
TABLET ORAL
Qty: 45 TABLET | Refills: 0 | Status: SHIPPED | OUTPATIENT
Start: 2025-06-04 | End: 2025-06-09 | Stop reason: SDUPTHER

## 2025-06-04 NOTE — TELEPHONE ENCOUNTER
1 month refill for warfarin sent to pharmacy. Patient is overdue for INR     Evonne Weaver, PharmD  6/4/2025  10:40 EDT

## 2025-06-06 ENCOUNTER — ANTICOAGULATION VISIT (OUTPATIENT)
Dept: PHARMACY | Facility: HOSPITAL | Age: 75
End: 2025-06-06
Payer: MEDICARE

## 2025-06-06 DIAGNOSIS — Z95.2 HX OF MITRAL VALVE REPLACEMENT WITH MECHANICAL VALVE: Primary | ICD-10-CM

## 2025-06-06 LAB — INR PPP: 2.9

## 2025-06-06 NOTE — PROGRESS NOTES
Anticoagulation Clinic - Remote Progress Note  [facility]  Testing Frequency: weekly     Indication: mMVR (2008)  Referring Provider: Smitha (last galindo: 10/03/22)  Goal INR: 3.0-4.0  Current Drug Interactions: aspirin, paroxetine, spironolactone     Diet: GLV consistent 3/17/2023  Alcohol: none  Tobacco: none; quit 2008  OTC Pain Medication: APAP PRN     INR History:  Date 9/13 10/2 10/7 10/15 10/21 10/30 11/6 11/13 11/22 11/27 12/3 12/6   Total Weekly Dose 16mg 28mg 20mg 18mg 20mg 20mg 18mg 16mg 14mg 18mg 16mg 18mg   INR 1.98 7.1, 7.2  4.59 justen 3.31 3.18  3.1 4.4 3.7 3.3 2.69 4.1 2.2 2.9   Notes hosp adm clinic       HM training   decr GLV; Ensure ED; hematuria; 2x self-hold rocephin x1, keflex  keflex done 11/29        Date 12/13 12/17 12/24 1/2/20 1/8 1/15 1/21 1/30 2/5 2/19 3/4 3/18   Total Weekly Dose 18mg 20mg 20mg 20mg 20mg 20mg 21mg 20mg 20mg 20mg 20mg 20mg   INR 2.1 3.0 3.1 4.0 3.4 2.7 2.7 3.4 3.5 3.8 2.9 5.1   Notes   Ensure cooked spinach decr GLV; n/v   incr GLV 1x incr dose 1x incr dose     incr GLV? sick, decr appetite      Date 3/20 3/26 4/1 4/7 4/15 4/23 5/1 5/11 5/14 5/22 6/1 6/9   Total Weekly Dose 18mg 20mg 18mg 14mg 17mg 17mg 17mg 18mg? 20mg 20mg 19mg 19mg   INR 3.0 4.0 5.8 3.1 3.7 3.0 2.8 2.4 3.1 4.0 4.2 3.9   Notes 1x hold; Broccoli ensure x1/day   1x hold     decr GLV Miss?              Date 6/14 6/19 6/23 6/29 7/7 7/16 7/24 8/7 8/25 8/28 9/11 9/18   Total Weekly Dose 19mg 20mg? 20mg 19mg 19mg 16mg 19mg 19mg 19mg 14mg 16mg 20mg   INR 3.2 3.1 4.6 4.0 4.0 3.2 3.6 3.6 5.3 2.9 2.9 4.0   Notes   Self adj decr appetite   3x miss ensure 1x miss  No ensure          not eating 1x hold 1x miss        Date 10/3 10/20 10/27 11/1 11/11 11/19 11/25 12/3 12/11 12/17 12/22 12/29   Total Weekly Dose 20mg 19mg 21mg 20mg 20mg 21mg 19mg 20mg 20mg 19mg 17mg 18mg   INR 3.2 2.7 3.0 3.5 3.6 4.1 2.9 3.6 4.8 4.9 4.3 4.1   Notes Syncopal episode; decr vit K     recv'd 11/2; epistaxis   1x extra dose 1x decr  dose   no Ensure sick; decr appetite          Date 1/5/21 1/12 1/19 1/20 2/3 2/9 2/23 3/2 3/9 3/17 4/1 4/9   Total WeeklyDose 18mg 19mg 19mg 18mg 18mg 19mg 18mg 18mg 18mg 18mg 19mg 20mg   INR 3.3 4.3 4.9 2.89 justen  4.0 POCT 3.03 justen  2.9 POCT 3.7 3.5 4.2 2.9 2.9 2.0 3.7   Notes incr GLV extra 1x mis- dose; decr appetite 1x decr dose zero ensure x3d HM   incr EtOH incr GLV   incr GLV 2x incr dose      Date 4/30 5/7 5/18 5/26 6/7 6/24 6/28 7/2 7/11 7/20 8/3 8/17   Total WeeklyDose 20mg 21mg 21mg 21mg 18mg 6mg 13mg  22mg 21mg 21mg 21mg 21mg   INR 2.6 3.2 3.6 3.7 2.6 1.3 1.8 3.2 3.4 3.2 4.1 5.5   Notes paxil incr; decr VitK       1x miss [facility] admit, held for c'scope,  hemorrroids                  Date 8/18 8/26 9/7 9/17 10/8 11/2 11/18 11/21 12/2 12/13 12/21 1/13   Total WeeklyDose 18mg 21mg 20mg 21mg 21mg 21mg 21mg 20mg 21mg 21mg 21mg 21mg   INR 3.4 4.2 3.6 4.1 3.5 3.6 4.2 3.7 4.6 3.8 4.6 3.1   Notes 1x hold; justen   1x decr dose       fall   fall, APAP   decr appetite        Date 2/8 2/15 2/24 2/28 3/3 3/8 3/15 4/1 4/7   5/20 6/2   Total WeeklyDose 21mg 19mg 15mg 15mg 22mg 20mg 19 mg 20 mg 20 mg Non compliant 21mg 20 mg   INR 4.9 6.7 2.8 2.5 4.2 4.2 3.7 3.8 3.2   5.2 3.8   Notes Reduced appetite 1x decr   2x miss 2xmiss 1x boost 1x decr                    Date 6/27 7/13 8/2 9/1 10/4 10/28 11/4 12/9 12/28   3/17/23 3/27   Total WeeklyDose   20mg 20 mg 20mg 20mg 17mg   20 mg 20 mg Non compliant 21 mg  20 mg   INR 2.9 3.4 3.5 3.1 4.2 2.4 3.5 3.1 5.0   3.2 3.4   Notes 1x miss         1x miss         Inc GLV        Date 4/12 4/15 4/19 5/17/23 6/2 6/20 7/17 8/9/23 08/17   10/23/23 11/3   Total WeeklyDose 20 mg 20 mg  20 mg  20 mg 20 mg 17 mg 20 mg 20 mg 21 mg Non compliant 20 mg 20 mg   INR 3.2 2.7 3.0 3.4 3.0 2.9 3.1 2.6 3.9   3.2 3.1   Notes   rec4/19       1x miss   Possible miss  1x boost            Date 11/10 11/17 12/06 12/13 12/22/23 1/5/2024 1/26/24 2/2/24 2/20 3/1/24 3/18/24 4/2/24   Total WeeklyDose 20 mg 21 mg 20  mg 17mg 20 mg 20mg 20 mg 17 mg 17 mg 20 mg 11 mg     11 mg   INR 2.9 3.4 3.4 2.9 3.2 2.9 3.3 2.8 2.9 3.1 3.0 1.8   Notes       1x miss Rec'd 12/26/23     1x miss  1 miss dose     Several   Missed doses      Date 4/5/2024 4/15 4/26 5/2 5/14/24 5/22 6/7/24 6/18 6/25 7/02 7/11 7/24/24   Total WeeklyDose 17 mg 20 mg 18 mg 21 mg 20 mg 17 mg 20 mg 21 mg 21 mg 21 mg 21 mg 21 mg   INR 4.0 3.4 2.8 4.3 3.8 3.2 2.5 3.2 2.9 3.0 2.4 3.1   Notes 2x boost    Missed dosex1 Rec'd 5/3   1x miss INC Vitamin K    Inc GLV     1x boost       Date 8/14/24 8/23 8/28/24 9/9/24 9/25 9/30 10/2/24 10/4 10/10 10/28/24 12/13 1/2   Total WeeklyDose 21 mg 21mg  24 mg  21 mg 21 mg 12mg  15 mg 15 mg 24 mg 21 mg 21 mg 21 mg   INR 1.8 2.2 2.9 3.2 3.2 1.5 2.0 2.4 3.6 3.1 3.3 3.1   Notes       Overdue  1x boost       1x BOOST          Bleeding   Rec 1/3      Date 2/21  2/24  3/7 4/11 6/6                 Total WeeklyDose 21 mg  15 mg  21 mg 21 mg                  INR 2.9  3.1  3.0  3.1 2.9                 Notes     Vomiting,  sick                       Phone Interview:  Tablet Strength: 2mg  Patient Contact Info: [phone] - LVM no voicemail  Verbal Release Authorization signed on 10/2/19 -- may speak with Thigao Gill (daughter, [phone]) or Pranay Piedra (son, [phone]); she requests that we LVM on one of her children's phones if she does not answer herself within about a 1/2 days time.   Lab Contact Info: [facility]    UNABLE TO GET IN CONTACT WITH THE PATIENT. PLEASE DISREGARD THE FOLLOWING PLAN UNTIL ABLE TO GET IN CONTACT WITH PATIENT/ PATIENT REPRESENTATIVE. [facility] 6/6, 6/9        Plan:  INR is therapeutic at 2.9 (goal 3.0-4.0). Instructed Ms. Piedra to continue warfarin 3 mg daily until recheck.   Repeat INR   Verbal information provided over the phone. Chase Piedra wrote down and RBV dosing instructions, expresses understanding by teach back, and has no further questions at this time

## 2025-06-09 DIAGNOSIS — Z95.2 HX OF MITRAL VALVE REPLACEMENT WITH MECHANICAL VALVE: ICD-10-CM

## 2025-06-09 RX ORDER — WARFARIN SODIUM 2 MG/1
TABLET ORAL
Qty: 45 TABLET | Refills: 0 | Status: SHIPPED | OUTPATIENT
Start: 2025-06-09

## 2025-06-11 ENCOUNTER — TELEPHONE (OUTPATIENT)
Dept: FAMILY MEDICINE CLINIC | Facility: CLINIC | Age: 75
End: 2025-06-11
Payer: MEDICARE

## 2025-06-11 NOTE — TELEPHONE ENCOUNTER
ACCORDING TO THE PT SISTER SHE HAS HAD 3 FALLS AND LOSING HER BALANCE. WHEN SHE MOVES HER HEAD SHE HAS VERTIGO AND IS HAVING ISSUES STAYING AWAKE.     MADE THE PT AN APPT FOR TOMORROW

## 2025-06-11 NOTE — TELEPHONE ENCOUNTER
Yes this is ok, but if symptoms worsen, develops any sign of stroke, fever, confusion, or is truly having issues staying awake, to ER.

## 2025-06-12 ENCOUNTER — APPOINTMENT (OUTPATIENT)
Dept: GENERAL RADIOLOGY | Facility: HOSPITAL | Age: 75
End: 2025-06-12
Payer: MEDICARE

## 2025-06-12 ENCOUNTER — APPOINTMENT (OUTPATIENT)
Dept: MRI IMAGING | Facility: HOSPITAL | Age: 75
End: 2025-06-12
Payer: MEDICARE

## 2025-06-12 ENCOUNTER — APPOINTMENT (OUTPATIENT)
Dept: CT IMAGING | Facility: HOSPITAL | Age: 75
End: 2025-06-12
Payer: MEDICARE

## 2025-06-12 ENCOUNTER — HOSPITAL ENCOUNTER (EMERGENCY)
Facility: HOSPITAL | Age: 75
Discharge: HOME | End: 2025-06-12
Attending: EMERGENCY MEDICINE
Payer: MEDICARE

## 2025-06-12 ENCOUNTER — OFFICE VISIT (OUTPATIENT)
Dept: FAMILY MEDICINE CLINIC | Facility: CLINIC | Age: 75
End: 2025-06-12
Payer: MEDICARE

## 2025-06-12 VITALS
OXYGEN SATURATION: 95 % | BODY MASS INDEX: 21.82 KG/M2 | RESPIRATION RATE: 16 BRPM | WEIGHT: 144 LBS | HEIGHT: 68 IN | TEMPERATURE: 98.2 F | DIASTOLIC BLOOD PRESSURE: 81 MMHG | SYSTOLIC BLOOD PRESSURE: 173 MMHG | HEART RATE: 66 BPM

## 2025-06-12 VITALS — WEIGHT: 146 LBS | OXYGEN SATURATION: 92 % | TEMPERATURE: 97.6 F | BODY MASS INDEX: 22.13 KG/M2 | HEIGHT: 68 IN

## 2025-06-12 DIAGNOSIS — Z91.81 AT HIGH RISK FOR FALLS: ICD-10-CM

## 2025-06-12 DIAGNOSIS — R79.1 SUBTHERAPEUTIC INTERNATIONAL NORMALIZED RATIO (INR): ICD-10-CM

## 2025-06-12 DIAGNOSIS — R26.9 GAIT DISTURBANCE: Primary | ICD-10-CM

## 2025-06-12 DIAGNOSIS — E04.1 LEFT THYROID NODULE: ICD-10-CM

## 2025-06-12 DIAGNOSIS — R41.0 CONFUSION: ICD-10-CM

## 2025-06-12 DIAGNOSIS — I10 HYPERTENSION, UNSPECIFIED TYPE: ICD-10-CM

## 2025-06-12 DIAGNOSIS — G93.0 ARACHNOID CYST OF POSTERIOR CRANIAL FOSSA: ICD-10-CM

## 2025-06-12 DIAGNOSIS — R29.818 NEUROLOGICAL DEFICIT PRESENT: Primary | ICD-10-CM

## 2025-06-12 DIAGNOSIS — R42 DIZZINESS: ICD-10-CM

## 2025-06-12 DIAGNOSIS — I67.1 ANEURYSM OF INTERNAL CAROTID ARTERY: ICD-10-CM

## 2025-06-12 LAB
ABO GROUP BLD: NORMAL
ALBUMIN SERPL-MCNC: 4.2 G/DL (ref 3.5–5.2)
ALBUMIN/GLOB SERPL: 1.4 G/DL
ALP SERPL-CCNC: 112 U/L (ref 39–117)
ALT SERPL W P-5'-P-CCNC: 9 U/L (ref 1–33)
ANION GAP SERPL CALCULATED.3IONS-SCNC: 9 MMOL/L (ref 5–15)
AST SERPL-CCNC: 20 U/L (ref 1–32)
BASOPHILS # BLD AUTO: 0.05 10*3/MM3 (ref 0–0.2)
BASOPHILS NFR BLD AUTO: 0.6 % (ref 0–1.5)
BILIRUB SERPL-MCNC: 1.1 MG/DL (ref 0–1.2)
BILIRUB UR QL STRIP: NEGATIVE
BLD GP AB SCN SERPL QL: NEGATIVE
BUN SERPL-MCNC: 12.6 MG/DL (ref 8–23)
BUN/CREAT SERPL: 19.1 (ref 7–25)
CALCIUM SPEC-SCNC: 9.1 MG/DL (ref 8.6–10.5)
CHLORIDE SERPL-SCNC: 104 MMOL/L (ref 98–107)
CLARITY UR: CLEAR
CO2 SERPL-SCNC: 29 MMOL/L (ref 22–29)
COLOR UR: YELLOW
CREAT SERPL-MCNC: 0.66 MG/DL (ref 0.57–1)
DEPRECATED RDW RBC AUTO: 42.5 FL (ref 37–54)
EGFRCR SERPLBLD CKD-EPI 2021: 91 ML/MIN/1.73
EOSINOPHIL # BLD AUTO: 0.16 10*3/MM3 (ref 0–0.4)
EOSINOPHIL NFR BLD AUTO: 1.8 % (ref 0.3–6.2)
ERYTHROCYTE [DISTWIDTH] IN BLOOD BY AUTOMATED COUNT: 13.6 % (ref 12.3–15.4)
GLOBULIN UR ELPH-MCNC: 2.9 GM/DL
GLUCOSE SERPL-MCNC: 101 MG/DL (ref 65–99)
GLUCOSE UR STRIP-MCNC: NEGATIVE MG/DL
HCT VFR BLD AUTO: 43.2 % (ref 34–46.6)
HGB BLD-MCNC: 14.1 G/DL (ref 12–15.9)
HGB UR QL STRIP.AUTO: NEGATIVE
HOLD SPECIMEN: NORMAL
IMM GRANULOCYTES # BLD AUTO: 0.02 10*3/MM3 (ref 0–0.05)
IMM GRANULOCYTES NFR BLD AUTO: 0.2 % (ref 0–0.5)
INR PPP: 1.53 (ref 0.89–1.12)
KETONES UR QL STRIP: NEGATIVE
LEUKOCYTE ESTERASE UR QL STRIP.AUTO: NEGATIVE
LYMPHOCYTES # BLD AUTO: 1.28 10*3/MM3 (ref 0.7–3.1)
LYMPHOCYTES NFR BLD AUTO: 14.5 % (ref 19.6–45.3)
MCH RBC QN AUTO: 28.1 PG (ref 26.6–33)
MCHC RBC AUTO-ENTMCNC: 32.6 G/DL (ref 31.5–35.7)
MCV RBC AUTO: 86.1 FL (ref 79–97)
MONOCYTES # BLD AUTO: 0.71 10*3/MM3 (ref 0.1–0.9)
MONOCYTES NFR BLD AUTO: 8 % (ref 5–12)
NEUTROPHILS NFR BLD AUTO: 6.63 10*3/MM3 (ref 1.7–7)
NEUTROPHILS NFR BLD AUTO: 74.9 % (ref 42.7–76)
NITRITE UR QL STRIP: NEGATIVE
NRBC BLD AUTO-RTO: 0 /100 WBC (ref 0–0.2)
PH UR STRIP.AUTO: 5.5 [PH] (ref 5–8)
PLATELET # BLD AUTO: 199 10*3/MM3 (ref 140–450)
PMV BLD AUTO: 10.7 FL (ref 6–12)
POTASSIUM SERPL-SCNC: 4.1 MMOL/L (ref 3.5–5.2)
PROT SERPL-MCNC: 7.1 G/DL (ref 6–8.5)
PROT UR QL STRIP: NEGATIVE
PROTHROMBIN TIME: 19.4 SECONDS (ref 12.2–15.3)
RBC # BLD AUTO: 5.02 10*6/MM3 (ref 3.77–5.28)
RH BLD: POSITIVE
SODIUM SERPL-SCNC: 142 MMOL/L (ref 136–145)
SP GR UR STRIP: 1.09 (ref 1–1.03)
T&S EXPIRATION DATE: NORMAL
UROBILINOGEN UR QL STRIP: ABNORMAL
WBC NRBC COR # BLD AUTO: 8.85 10*3/MM3 (ref 3.4–10.8)
WHOLE BLOOD HOLD COAG: NORMAL
WHOLE BLOOD HOLD SPECIMEN: NORMAL

## 2025-06-12 PROCEDURE — 71045 X-RAY EXAM CHEST 1 VIEW: CPT

## 2025-06-12 PROCEDURE — 86900 BLOOD TYPING SEROLOGIC ABO: CPT | Performed by: EMERGENCY MEDICINE

## 2025-06-12 PROCEDURE — 99214 OFFICE O/P EST MOD 30 MIN: CPT | Performed by: STUDENT IN AN ORGANIZED HEALTH CARE EDUCATION/TRAINING PROGRAM

## 2025-06-12 PROCEDURE — 1159F MED LIST DOCD IN RCRD: CPT | Performed by: STUDENT IN AN ORGANIZED HEALTH CARE EDUCATION/TRAINING PROGRAM

## 2025-06-12 PROCEDURE — 85610 PROTHROMBIN TIME: CPT | Performed by: EMERGENCY MEDICINE

## 2025-06-12 PROCEDURE — 86901 BLOOD TYPING SEROLOGIC RH(D): CPT

## 2025-06-12 PROCEDURE — 70496 CT ANGIOGRAPHY HEAD: CPT

## 2025-06-12 PROCEDURE — 1160F RVW MEDS BY RX/DR IN RCRD: CPT | Performed by: STUDENT IN AN ORGANIZED HEALTH CARE EDUCATION/TRAINING PROGRAM

## 2025-06-12 PROCEDURE — 25510000001 IOPAMIDOL PER 1 ML: Performed by: EMERGENCY MEDICINE

## 2025-06-12 PROCEDURE — 86901 BLOOD TYPING SEROLOGIC RH(D): CPT | Performed by: EMERGENCY MEDICINE

## 2025-06-12 PROCEDURE — 86900 BLOOD TYPING SEROLOGIC ABO: CPT

## 2025-06-12 PROCEDURE — 81003 URINALYSIS AUTO W/O SCOPE: CPT | Performed by: EMERGENCY MEDICINE

## 2025-06-12 PROCEDURE — 86850 RBC ANTIBODY SCREEN: CPT | Performed by: EMERGENCY MEDICINE

## 2025-06-12 PROCEDURE — 70450 CT HEAD/BRAIN W/O DYE: CPT

## 2025-06-12 PROCEDURE — 99285 EMERGENCY DEPT VISIT HI MDM: CPT

## 2025-06-12 PROCEDURE — 85025 COMPLETE CBC W/AUTO DIFF WBC: CPT | Performed by: EMERGENCY MEDICINE

## 2025-06-12 PROCEDURE — 80053 COMPREHEN METABOLIC PANEL: CPT | Performed by: EMERGENCY MEDICINE

## 2025-06-12 PROCEDURE — 70551 MRI BRAIN STEM W/O DYE: CPT

## 2025-06-12 PROCEDURE — 70498 CT ANGIOGRAPHY NECK: CPT

## 2025-06-12 PROCEDURE — 36415 COLL VENOUS BLD VENIPUNCTURE: CPT

## 2025-06-12 PROCEDURE — 1126F AMNT PAIN NOTED NONE PRSNT: CPT | Performed by: STUDENT IN AN ORGANIZED HEALTH CARE EDUCATION/TRAINING PROGRAM

## 2025-06-12 RX ORDER — SODIUM CHLORIDE 0.9 % (FLUSH) 0.9 %
10 SYRINGE (ML) INJECTION AS NEEDED
Status: DISCONTINUED | OUTPATIENT
Start: 2025-06-12 | End: 2025-06-12 | Stop reason: HOSPADM

## 2025-06-12 RX ORDER — IOPAMIDOL 755 MG/ML
75 INJECTION, SOLUTION INTRAVASCULAR
Status: COMPLETED | OUTPATIENT
Start: 2025-06-12 | End: 2025-06-12

## 2025-06-12 RX ADMIN — IOPAMIDOL 75 ML: 755 INJECTION, SOLUTION INTRAVENOUS at 15:59
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
## 2025-06-12 NOTE — PROGRESS NOTES
Anticoagulation Clinic - Remote Progress Note  [facility]  Testing Frequency: weekly     Indication: mMVR (2008)  Referring Provider: Smitha (last galindo: 10/03/22)  Goal INR: 3.0-4.0  Current Drug Interactions: aspirin, paroxetine, spironolactone     Diet: GLV consistent 3/17/2023  Alcohol: none  Tobacco: none; quit 2008  OTC Pain Medication: APAP PRN     INR History:  Date 9/13 10/2 10/7 10/15 10/21 10/30 11/6 11/13 11/22 11/27 12/3 12/6   Total Weekly Dose 16mg 28mg 20mg 18mg 20mg 20mg 18mg 16mg 14mg 18mg 16mg 18mg   INR 1.98 7.1, 7.2  4.59 justen 3.31 3.18  3.1 4.4 3.7 3.3 2.69 4.1 2.2 2.9   Notes hosp adm clinic       HM training   decr GLV; Ensure ED; hematuria; 2x self-hold rocephin x1, keflex  keflex done 11/29        Date 12/13 12/17 12/24 1/2/20 1/8 1/15 1/21 1/30 2/5 2/19 3/4 3/18   Total Weekly Dose 18mg 20mg 20mg 20mg 20mg 20mg 21mg 20mg 20mg 20mg 20mg 20mg   INR 2.1 3.0 3.1 4.0 3.4 2.7 2.7 3.4 3.5 3.8 2.9 5.1   Notes   Ensure cooked spinach decr GLV; n/v   incr GLV 1x incr dose 1x incr dose     incr GLV? sick, decr appetite      Date 3/20 3/26 4/1 4/7 4/15 4/23 5/1 5/11 5/14 5/22 6/1 6/9   Total Weekly Dose 18mg 20mg 18mg 14mg 17mg 17mg 17mg 18mg? 20mg 20mg 19mg 19mg   INR 3.0 4.0 5.8 3.1 3.7 3.0 2.8 2.4 3.1 4.0 4.2 3.9   Notes 1x hold; Broccoli ensure x1/day   1x hold     decr GLV Miss?              Date 6/14 6/19 6/23 6/29 7/7 7/16 7/24 8/7 8/25 8/28 9/11 9/18   Total Weekly Dose 19mg 20mg? 20mg 19mg 19mg 16mg 19mg 19mg 19mg 14mg 16mg 20mg   INR 3.2 3.1 4.6 4.0 4.0 3.2 3.6 3.6 5.3 2.9 2.9 4.0   Notes   Self adj decr appetite   3x miss ensure 1x miss  No ensure          not eating 1x hold 1x miss        Date 10/3 10/20 10/27 11/1 11/11 11/19 11/25 12/3 12/11 12/17 12/22 12/29   Total Weekly Dose 20mg 19mg 21mg 20mg 20mg 21mg 19mg 20mg 20mg 19mg 17mg 18mg   INR 3.2 2.7 3.0 3.5 3.6 4.1 2.9 3.6 4.8 4.9 4.3 4.1   Notes Syncopal episode; decr vit K     recv'd 11/2; epistaxis   1x extra dose 1x decr  dose   no Ensure sick; decr appetite          Date 1/5/21 1/12 1/19 1/20 2/3 2/9 2/23 3/2 3/9 3/17 4/1 4/9   Total WeeklyDose 18mg 19mg 19mg 18mg 18mg 19mg 18mg 18mg 18mg 18mg 19mg 20mg   INR 3.3 4.3 4.9 2.89 justen  4.0 POCT 3.03 justen  2.9 POCT 3.7 3.5 4.2 2.9 2.9 2.0 3.7   Notes incr GLV extra 1x mis- dose; decr appetite 1x decr dose zero ensure x3d HM   incr EtOH incr GLV   incr GLV 2x incr dose      Date 4/30 5/7 5/18 5/26 6/7 6/24 6/28 7/2 7/11 7/20 8/3 8/17   Total WeeklyDose 20mg 21mg 21mg 21mg 18mg 6mg 13mg  22mg 21mg 21mg 21mg 21mg   INR 2.6 3.2 3.6 3.7 2.6 1.3 1.8 3.2 3.4 3.2 4.1 5.5   Notes paxil incr; decr VitK       1x miss [facility] admit, held for c'scope,  hemorrroids                  Date 8/18 8/26 9/7 9/17 10/8 11/2 11/18 11/21 12/2 12/13 12/21 1/13   Total WeeklyDose 18mg 21mg 20mg 21mg 21mg 21mg 21mg 20mg 21mg 21mg 21mg 21mg   INR 3.4 4.2 3.6 4.1 3.5 3.6 4.2 3.7 4.6 3.8 4.6 3.1   Notes 1x hold; justen   1x decr dose       fall   fall, APAP   decr appetite        Date 2/8 2/15 2/24 2/28 3/3 3/8 3/15 4/1 4/7   5/20 6/2   Total WeeklyDose 21mg 19mg 15mg 15mg 22mg 20mg 19 mg 20 mg 20 mg Non compliant 21mg 20 mg   INR 4.9 6.7 2.8 2.5 4.2 4.2 3.7 3.8 3.2   5.2 3.8   Notes Reduced appetite 1x decr   2x miss 2xmiss 1x boost 1x decr                    Date 6/27 7/13 8/2 9/1 10/4 10/28 11/4 12/9 12/28   3/17/23 3/27   Total WeeklyDose   20mg 20 mg 20mg 20mg 17mg   20 mg 20 mg Non compliant 21 mg  20 mg   INR 2.9 3.4 3.5 3.1 4.2 2.4 3.5 3.1 5.0   3.2 3.4   Notes 1x miss         1x miss         Inc GLV        Date 4/12 4/15 4/19 5/17/23 6/2 6/20 7/17 8/9/23 08/17   10/23/23 11/3   Total WeeklyDose 20 mg 20 mg  20 mg  20 mg 20 mg 17 mg 20 mg 20 mg 21 mg Non compliant 20 mg 20 mg   INR 3.2 2.7 3.0 3.4 3.0 2.9 3.1 2.6 3.9   3.2 3.1   Notes   rec4/19       1x miss   Possible miss  1x boost            Date 11/10 11/17 12/06 12/13 12/22/23 1/5/2024 1/26/24 2/2/24 2/20 3/1/24 3/18/24 4/2/24   Total WeeklyDose 20 mg 21 mg 20  mg 17mg 20 mg 20mg 20 mg 17 mg 17 mg 20 mg 11 mg     11 mg   INR 2.9 3.4 3.4 2.9 3.2 2.9 3.3 2.8 2.9 3.1 3.0 1.8   Notes       1x miss Rec'd 12/26/23     1x miss  1 miss dose     Several   Missed doses      Date 4/5/2024 4/15 4/26 5/2 5/14/24 5/22 6/7/24 6/18 6/25 7/02 7/11 7/24/24   Total WeeklyDose 17 mg 20 mg 18 mg 21 mg 20 mg 17 mg 20 mg 21 mg 21 mg 21 mg 21 mg 21 mg   INR 4.0 3.4 2.8 4.3 3.8 3.2 2.5 3.2 2.9 3.0 2.4 3.1   Notes 2x boost    Missed dosex1 Rec'd 5/3   1x miss INC Vitamin K    Inc GLV     1x boost       Date 8/14/24 8/23 8/28/24 9/9/24 9/25 9/30 10/2/24 10/4 10/10 10/28/24 12/13 1/2   Total WeeklyDose 21 mg 21mg  24 mg  21 mg 21 mg 12mg  15 mg 15 mg 24 mg 21 mg 21 mg 21 mg   INR 1.8 2.2 2.9 3.2 3.2 1.5 2.0 2.4 3.6 3.1 3.3 3.1   Notes       Overdue  1x boost       1x BOOST          Bleeding   Rec 1/3      Date 2/21  2/24  3/7 4/11 6/6                 Total WeeklyDose 21 mg  15 mg  21 mg 21 mg                   INR 2.9  3.1  3.0  3.1 2.9                 Notes     Vomiting,  sick                        Phone Interview:  Tablet Strength: 2mg  Patient Contact Info: [phone] - LVM no voicemail  Verbal Release Authorization signed on 10/2/19 -- may speak with Thiago Gill (daughter, [phone]) or Pranay Piedra (son, [phone]); she requests that we LVM on one of her children's phones if she does not answer herself within about a 1/2 days time.   Lab Contact Info: [facility]     UNABLE TO GET IN CONTACT WITH THE PATIENT. PLEASE DISREGARD THE FOLLOWING PLAN UNTIL ABLE TO GET IN CONTACT WITH PATIENT/ PATIENT REPRESENTATIVE. ULVM x 3. CLOSING ENCOUNTER.                  Annie Ovalles CPhT, [facility]   10:47 EDT   6/12/2025
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
## 2025-06-18 ENCOUNTER — OFFICE VISIT (OUTPATIENT)
Dept: FAMILY MEDICINE CLINIC | Facility: CLINIC | Age: 75
End: 2025-06-18
Payer: MEDICARE

## 2025-06-18 ENCOUNTER — PATIENT OUTREACH (OUTPATIENT)
Dept: CASE MANAGEMENT | Facility: CLINIC | Age: 75
End: 2025-06-18
Payer: MEDICARE

## 2025-06-18 VITALS
SYSTOLIC BLOOD PRESSURE: 122 MMHG | WEIGHT: 145 LBS | OXYGEN SATURATION: 98 % | HEIGHT: 68 IN | BODY MASS INDEX: 21.98 KG/M2 | TEMPERATURE: 98.8 F | HEART RATE: 80 BPM | DIASTOLIC BLOOD PRESSURE: 60 MMHG

## 2025-06-18 DIAGNOSIS — R29.818 NEUROLOGICAL DEFICIT PRESENT: Primary | ICD-10-CM

## 2025-06-18 DIAGNOSIS — J44.9 COPD MIXED TYPE: Primary | ICD-10-CM

## 2025-06-18 DIAGNOSIS — R41.3 MEMORY LOSS: ICD-10-CM

## 2025-06-18 DIAGNOSIS — R26.89 BALANCE PROBLEMS: ICD-10-CM

## 2025-06-18 PROCEDURE — 1160F RVW MEDS BY RX/DR IN RCRD: CPT | Performed by: PHYSICIAN ASSISTANT

## 2025-06-18 PROCEDURE — 99214 OFFICE O/P EST MOD 30 MIN: CPT | Performed by: PHYSICIAN ASSISTANT

## 2025-06-18 PROCEDURE — 3078F DIAST BP <80 MM HG: CPT | Performed by: PHYSICIAN ASSISTANT

## 2025-06-18 PROCEDURE — 1159F MED LIST DOCD IN RCRD: CPT | Performed by: PHYSICIAN ASSISTANT

## 2025-06-18 PROCEDURE — 1126F AMNT PAIN NOTED NONE PRSNT: CPT | Performed by: PHYSICIAN ASSISTANT

## 2025-06-18 PROCEDURE — 3074F SYST BP LT 130 MM HG: CPT | Performed by: PHYSICIAN ASSISTANT

## 2025-06-18 NOTE — OUTREACH NOTE
AMBULATORY CASE MANAGEMENT NOTE    Names and Relationships of Patient/Support Persons: Contact: Chase Piedra; Relationship: Self -     Patient Outreach    ACM contact patient via phone following hospitalization about CCM. Patient to think about it and ACM to follow up. Patient had PCP office follow up visit today.       Bina KRUEGER  Ambulatory Case Management    6/18/2025, 15:24 EDT

## 2025-06-19 ENCOUNTER — ANTICOAGULATION VISIT (OUTPATIENT)
Dept: PHARMACY | Facility: HOSPITAL | Age: 75
End: 2025-06-19
Payer: MEDICARE

## 2025-06-19 DIAGNOSIS — Z95.2 HX OF MITRAL VALVE REPLACEMENT WITH MECHANICAL VALVE: Primary | ICD-10-CM

## 2025-06-19 LAB — INR PPP: 3.4

## 2025-06-19 NOTE — PROGRESS NOTES
Anticoagulation Clinic - Remote Progress Note  [facility]  Testing Frequency: weekly     Indication: mMVR (2008)  Referring Provider: Smitha (last galindo: 10/03/22)  Goal INR: 3.0-4.0  Current Drug Interactions: aspirin, paroxetine, spironolactone     Diet: GLV consistent 3/17/2023  Alcohol: none  Tobacco: none; quit 2008  OTC Pain Medication: APAP PRN     INR History:  Date 9/13 10/2 10/7 10/15 10/21 10/30 11/6 11/13 11/22 11/27 12/3 12/6   Total Weekly Dose 16mg 28mg 20mg 18mg 20mg 20mg 18mg 16mg 14mg 18mg 16mg 18mg   INR 1.98 7.1, 7.2  4.59 justen 3.31 3.18  3.1 4.4 3.7 3.3 2.69 4.1 2.2 2.9   Notes hosp adm clinic       HM training   decr GLV; Ensure ED; hematuria; 2x self-hold rocephin x1, keflex  keflex done 11/29        Date 12/13 12/17 12/24 1/2/20 1/8 1/15 1/21 1/30 2/5 2/19 3/4 3/18   Total Weekly Dose 18mg 20mg 20mg 20mg 20mg 20mg 21mg 20mg 20mg 20mg 20mg 20mg   INR 2.1 3.0 3.1 4.0 3.4 2.7 2.7 3.4 3.5 3.8 2.9 5.1   Notes   Ensure cooked spinach decr GLV; n/v   incr GLV 1x incr dose 1x incr dose     incr GLV? sick, decr appetite      Date 3/20 3/26 4/1 4/7 4/15 4/23 5/1 5/11 5/14 5/22 6/1 6/9   Total Weekly Dose 18mg 20mg 18mg 14mg 17mg 17mg 17mg 18mg? 20mg 20mg 19mg 19mg   INR 3.0 4.0 5.8 3.1 3.7 3.0 2.8 2.4 3.1 4.0 4.2 3.9   Notes 1x hold; Broccoli ensure x1/day   1x hold     decr GLV Miss?              Date 6/14 6/19 6/23 6/29 7/7 7/16 7/24 8/7 8/25 8/28 9/11 9/18   Total Weekly Dose 19mg 20mg? 20mg 19mg 19mg 16mg 19mg 19mg 19mg 14mg 16mg 20mg   INR 3.2 3.1 4.6 4.0 4.0 3.2 3.6 3.6 5.3 2.9 2.9 4.0   Notes   Self adj decr appetite   3x miss ensure 1x miss  No ensure          not eating 1x hold 1x miss        Date 10/3 10/20 10/27 11/1 11/11 11/19 11/25 12/3 12/11 12/17 12/22 12/29   Total Weekly Dose 20mg 19mg 21mg 20mg 20mg 21mg 19mg 20mg 20mg 19mg 17mg 18mg   INR 3.2 2.7 3.0 3.5 3.6 4.1 2.9 3.6 4.8 4.9 4.3 4.1   Notes Syncopal episode; decr vit K     recv'd 11/2; epistaxis   1x extra dose 1x decr  dose   no Ensure sick; decr appetite          Date 1/5/21 1/12 1/19 1/20 2/3 2/9 2/23 3/2 3/9 3/17 4/1 4/9   Total WeeklyDose 18mg 19mg 19mg 18mg 18mg 19mg 18mg 18mg 18mg 18mg 19mg 20mg   INR 3.3 4.3 4.9 2.89 justen  4.0 POCT 3.03 justen  2.9 POCT 3.7 3.5 4.2 2.9 2.9 2.0 3.7   Notes incr GLV extra 1x mis- dose; decr appetite 1x decr dose zero ensure x3d HM   incr EtOH incr GLV   incr GLV 2x incr dose      Date 4/30 5/7 5/18 5/26 6/7 6/24 6/28 7/2 7/11 7/20 8/3 8/17   Total WeeklyDose 20mg 21mg 21mg 21mg 18mg 6mg 13mg  22mg 21mg 21mg 21mg 21mg   INR 2.6 3.2 3.6 3.7 2.6 1.3 1.8 3.2 3.4 3.2 4.1 5.5   Notes paxil incr; decr VitK       1x miss [facility] admit, held for c'scope,  hemorrroids                  Date 8/18 8/26 9/7 9/17 10/8 11/2 11/18 11/21 12/2 12/13 12/21 1/13   Total WeeklyDose 18mg 21mg 20mg 21mg 21mg 21mg 21mg 20mg 21mg 21mg 21mg 21mg   INR 3.4 4.2 3.6 4.1 3.5 3.6 4.2 3.7 4.6 3.8 4.6 3.1   Notes 1x hold; justen   1x decr dose       fall   fall, APAP   decr appetite        Date 2/8 2/15 2/24 2/28 3/3 3/8 3/15 4/1 4/7   5/20 6/2   Total WeeklyDose 21mg 19mg 15mg 15mg 22mg 20mg 19 mg 20 mg 20 mg Non compliant 21mg 20 mg   INR 4.9 6.7 2.8 2.5 4.2 4.2 3.7 3.8 3.2   5.2 3.8   Notes Reduced appetite 1x decr   2x miss 2xmiss 1x boost 1x decr                    Date 6/27 7/13 8/2 9/1 10/4 10/28 11/4 12/9 12/28   3/17/23 3/27   Total WeeklyDose   20mg 20 mg 20mg 20mg 17mg   20 mg 20 mg Non compliant 21 mg  20 mg   INR 2.9 3.4 3.5 3.1 4.2 2.4 3.5 3.1 5.0   3.2 3.4   Notes 1x miss         1x miss         Inc GLV        Date 4/12 4/15 4/19 5/17/23 6/2 6/20 7/17 8/9/23 08/17   10/23/23 11/3   Total WeeklyDose 20 mg 20 mg  20 mg  20 mg 20 mg 17 mg 20 mg 20 mg 21 mg Non compliant 20 mg 20 mg   INR 3.2 2.7 3.0 3.4 3.0 2.9 3.1 2.6 3.9   3.2 3.1   Notes   rec4/19       1x miss   Possible miss  1x boost            Date 11/10 11/17 12/06 12/13 12/22/23 1/5/2024 1/26/24 2/2/24 2/20 3/1/24 3/18/24 4/2/24   Total WeeklyDose 20 mg 21 mg 20  mg 17mg 20 mg 20mg 20 mg 17 mg 17 mg 20 mg 11 mg     11 mg   INR 2.9 3.4 3.4 2.9 3.2 2.9 3.3 2.8 2.9 3.1 3.0 1.8   Notes       1x miss Rec'd 12/26/23     1x miss  1 miss dose     Several   Missed doses      Date 4/5/2024 4/15 4/26 5/2 5/14/24 5/22 6/7/24 6/18 6/25 7/02 7/11 7/24/24   Total WeeklyDose 17 mg 20 mg 18 mg 21 mg 20 mg 17 mg 20 mg 21 mg 21 mg 21 mg 21 mg 21 mg   INR 4.0 3.4 2.8 4.3 3.8 3.2 2.5 3.2 2.9 3.0 2.4 3.1   Notes 2x boost    Missed dosex1 Rec'd 5/3   1x miss INC Vitamin K    Inc GLV     1x boost       Date 8/14/24 8/23 8/28/24 9/9/24 9/25 9/30 10/2/24 10/4 10/10 10/28/24 12/13 1/2   Total WeeklyDose 21 mg 21mg  24 mg  21 mg 21 mg 12mg  15 mg 15 mg 24 mg 21 mg 21 mg 21 mg   INR 1.8 2.2 2.9 3.2 3.2 1.5 2.0 2.4 3.6 3.1 3.3 3.1   Notes       Overdue  1x boost       1x BOOST          Bleeding   Rec 1/3      Date 2/21  2/24  3/7 4/11 6/6  6/19               Total WeeklyDose 21 mg  15 mg  21 mg 21 mg    21 mg               INR 2.9  3.1  3.0  3.1 2.9  3.4               Notes     Vomiting,  sick                        Phone Interview:  Tablet Strength: 2mg  Patient Contact Info: [phone] - LVM no voicemail  Verbal Release Authorization signed on 10/2/19 -- may speak with Thiago Gill (daughter, [phone]) or Pranay Piedra (son, [phone]); she requests that we LVM on one of her children's phones if she does not answer herself within about a 1/2 days time.   Lab Contact Info: [facility]        Patient Findings    Positives: Signs/symptoms of bleeding, Emergency department visit, Change in diet/appetite   Negatives: Signs/symptoms of thrombosis, Laboratory test error suspected, Change in health, Change in alcohol use, Change in activity, Upcoming invasive procedure, Upcoming dental procedure, Missed doses, Extra doses, Change in medications, Hospital admission, Bruising, Other complaints   Comments: Patient had nosebleed last night which reminded her to recheck her INR. Patient states there was  a small amount of blood when she woke up and when she blew her nose this morning. Patient believes it was due to her oxygen drying out her nose. ER 6/12 due to concern for stroke, it was determined by CT she has an arachnoid cyst on her brain due to a fall in 2016. Patient states she has been falling more frequently lately. Patient will be seeing Physical Therapy. Patient will be following up with a Neurologist. Patient states she has lost weight and currently weighs 144 lbs. Patient states there are times she has a decreased appetite and times that she forgets to eat. All other findings negative per patient.     Plan:  INR is therapeutic at 3.4 (goal 3.0-4.0). Instructed Ms. Piedra to continue warfarin 3 mg daily until recheck.   Repeat INR  6/26  Verbal information provided over the phone. Chase Piedra wrote down and RBV dosing instructions, expresses understanding by teach back, and has no further questions at this time      Keith Michel   [facility]  6/19/2025 11:21 EDT    IEvonne, PharmD, have reviewed the note in full and agree with the assessment and plan.  06/19/25  11:30 EDT

## 2025-06-23 ENCOUNTER — PATIENT OUTREACH (OUTPATIENT)
Dept: CASE MANAGEMENT | Facility: CLINIC | Age: 75
End: 2025-06-23
Payer: MEDICARE

## 2025-06-23 DIAGNOSIS — J44.9 COPD MIXED TYPE: Primary | ICD-10-CM

## 2025-06-23 NOTE — OUTREACH NOTE
AMBULATORY CASE MANAGEMENT NOTE    Names and Relationships of Patient/Support Persons: Contact: Chase Piedra; Relationship: Self -     Acm Followed up with Ccm Offer. Declined. Patient has PT appt on Wednesday and AWV scheduled 10/8. No HRCM needs       Bina KRUEGER  Ambulatory Case Management    6/23/2025, 13:33 EDT

## 2025-06-25 ENCOUNTER — TELEPHONE (OUTPATIENT)
Dept: FAMILY MEDICINE CLINIC | Facility: CLINIC | Age: 75
End: 2025-06-25

## 2025-06-25 NOTE — TELEPHONE ENCOUNTER
Caller: Audelia Martinez    Relationship: Emergency Contact    Best call back number:       [phone] (Mobile)     What specialty or service is being requested:     NEUROLOGIST    CALLER STATED REFERRED NEUROLOGIST IS NOT AVAILABLE UNTIL OCTOBER    CALLER REQUESTED ANOTHER NEUROLOGIST REFERRAL WHERE THE PATIENT COULD BE SEEN SOONER    What is the provider, practice or medical service name:     AS RECOMMENDED BY JAMEL BRADLEY    What is the office location:     [facility]    Any additional details:    PLEASE CONTACT CALLER WITH ANY UPDATES    CALLER ALSO STATED PATIENT HAS BEEN EXPERIENCING SOME CONFUSION LATELY AS WELL

## 2025-06-25 NOTE — TELEPHONE ENCOUNTER
We can try [facility] neurology or [facility] neurology, but I have a suspicion availability will not be much better.

## 2025-06-27 ENCOUNTER — TELEPHONE (OUTPATIENT)
Dept: FAMILY MEDICINE CLINIC | Facility: CLINIC | Age: 75
End: 2025-06-27

## 2025-06-27 NOTE — TELEPHONE ENCOUNTER
HUB TO READ    Spoke with [facility] and they said they are scheduling new patients starting at the end of July/early August. Referral was faxed and they will reach out to patient to get scheduled.

## 2025-06-27 NOTE — TELEPHONE ENCOUNTER
Caller: Audelia Martinez    Relationship: Emergency Contact    Best call back number:       [phone] (Mobile)     What specialty or service is being requested:     NEUROLOGIST    CALLER STATED PATIENT IS SCHEDULED WITH  NEUROLOGY IN OCTOBER/2025    CALLER REQUESTED ANOTHER REFERRAL THAT WOULD BE ABLE TO SCHEDULE SOONER THAN OCTOBER    What is the provider, practice or medical service name:     AS RECOMMENDED BY JAMEL BRADLEY    What is the office location:     [facility]FERAB[facility]    Any additional details:     PLEASE CONTACT CALLER WITH ANY UPDATES

## 2025-07-16 ENCOUNTER — TELEPHONE (OUTPATIENT)
Dept: PHARMACY | Facility: HOSPITAL | Age: 75
End: 2025-07-16

## 2025-07-23 ENCOUNTER — TELEPHONE (OUTPATIENT)
Dept: PHARMACY | Facility: HOSPITAL | Age: 75
End: 2025-07-23

## 2025-07-30 ENCOUNTER — TELEPHONE (OUTPATIENT)
Dept: PHARMACY | Facility: HOSPITAL | Age: 75
End: 2025-07-30

## 2025-08-05 ENCOUNTER — OFFICE VISIT (OUTPATIENT)
Dept: FAMILY MEDICINE CLINIC | Facility: CLINIC | Age: 75
End: 2025-08-05
Payer: MEDICARE

## 2025-08-05 VITALS
WEIGHT: 144 LBS | DIASTOLIC BLOOD PRESSURE: 64 MMHG | TEMPERATURE: 98.6 F | SYSTOLIC BLOOD PRESSURE: 124 MMHG | HEART RATE: 70 BPM | BODY MASS INDEX: 21.82 KG/M2 | HEIGHT: 68 IN | OXYGEN SATURATION: 98 %

## 2025-08-05 DIAGNOSIS — R41.0 CONFUSION: ICD-10-CM

## 2025-08-05 DIAGNOSIS — E04.1 THYROID NODULE: Primary | ICD-10-CM

## 2025-08-05 DIAGNOSIS — E78.5 HYPERLIPIDEMIA, UNSPECIFIED HYPERLIPIDEMIA TYPE: ICD-10-CM

## 2025-08-05 DIAGNOSIS — I25.10 CORONARY ARTERY DISEASE INVOLVING NATIVE CORONARY ARTERY OF NATIVE HEART WITHOUT ANGINA PECTORIS: ICD-10-CM

## 2025-08-05 DIAGNOSIS — Z95.2 HX OF MITRAL VALVE REPLACEMENT WITH MECHANICAL VALVE: ICD-10-CM

## 2025-08-05 RX ORDER — WARFARIN SODIUM 2 MG/1
TABLET ORAL
Qty: 45 TABLET | Refills: 5 | Status: SHIPPED | OUTPATIENT
Start: 2025-08-05

## 2025-08-05 RX ORDER — PRAVASTATIN SODIUM 80 MG/1
80 TABLET ORAL NIGHTLY
Qty: 90 TABLET | Refills: 3 | Status: SHIPPED | OUTPATIENT
Start: 2025-08-05

## 2025-08-07 ENCOUNTER — LAB (OUTPATIENT)
Dept: LAB | Facility: HOSPITAL | Age: 75
End: 2025-08-07
Payer: MEDICARE

## 2025-08-07 DIAGNOSIS — E04.1 THYROID NODULE: ICD-10-CM

## 2025-08-07 DIAGNOSIS — R41.0 CONFUSION: ICD-10-CM

## 2025-08-07 LAB
BILIRUB UR QL STRIP: NEGATIVE
CLARITY UR: ABNORMAL
COLOR UR: YELLOW
GLUCOSE UR STRIP-MCNC: NEGATIVE MG/DL
HGB UR QL STRIP.AUTO: NEGATIVE
HOLD SPECIMEN: NORMAL
KETONES UR QL STRIP: NEGATIVE
LEUKOCYTE ESTERASE UR QL STRIP.AUTO: ABNORMAL
NITRITE UR QL STRIP: NEGATIVE
PH UR STRIP.AUTO: 6 [PH] (ref 5–8)
PROT UR QL STRIP: ABNORMAL
SP GR UR STRIP: 1.02 (ref 1–1.03)
T4 FREE SERPL-MCNC: 1.04 NG/DL (ref 0.92–1.68)
TSH SERPL DL<=0.05 MIU/L-ACNC: 0.85 UIU/ML (ref 0.27–4.2)
UROBILINOGEN UR QL STRIP: ABNORMAL

## 2025-08-07 PROCEDURE — 87086 URINE CULTURE/COLONY COUNT: CPT

## 2025-08-07 PROCEDURE — 36415 COLL VENOUS BLD VENIPUNCTURE: CPT

## 2025-08-07 PROCEDURE — 87186 SC STD MICRODIL/AGAR DIL: CPT

## 2025-08-07 PROCEDURE — 87088 URINE BACTERIA CULTURE: CPT

## 2025-08-07 PROCEDURE — 84439 ASSAY OF FREE THYROXINE: CPT

## 2025-08-07 PROCEDURE — 84443 ASSAY THYROID STIM HORMONE: CPT

## 2025-08-07 PROCEDURE — 81001 URINALYSIS AUTO W/SCOPE: CPT

## 2025-08-08 LAB
BACTERIA UR QL AUTO: ABNORMAL /HPF
HYALINE CASTS UR QL AUTO: ABNORMAL /LPF
RBC # UR STRIP: ABNORMAL /HPF
REF LAB TEST METHOD: ABNORMAL
SQUAMOUS #/AREA URNS HPF: ABNORMAL /HPF
WBC # UR STRIP: ABNORMAL /HPF

## 2025-08-10 LAB — BACTERIA SPEC AEROBE CULT: ABNORMAL

## 2025-08-11 ENCOUNTER — RESULTS FOLLOW-UP (OUTPATIENT)
Dept: LAB | Facility: HOSPITAL | Age: 75
End: 2025-08-11
Payer: MEDICARE

## 2025-08-27 ENCOUNTER — HOSPITAL ENCOUNTER (OUTPATIENT)
Dept: ULTRASOUND IMAGING | Facility: HOSPITAL | Age: 75
Discharge: HOME | End: 2025-08-27
Admitting: PHYSICIAN ASSISTANT
Payer: MEDICARE

## 2025-08-27 ENCOUNTER — TELEPHONE (OUTPATIENT)
Dept: CARDIOLOGY | Facility: CLINIC | Age: 75
End: 2025-08-27
Payer: MEDICARE

## 2025-08-27 PROCEDURE — 76536 US EXAM OF HEAD AND NECK: CPT

## (undated) DEVICE — KT ORCA ORCAPOD DISP STRL

## (undated) DEVICE — GLV SURG TRIUMPH ORTHO W/ALOE PF LTX 8 STRL

## (undated) DEVICE — GLV SURG SENSICARE W/ALOE PF LF 8 STRL

## (undated) DEVICE — INTRO ACCSR BLNT TP

## (undated) DEVICE — ELECTRD PAD DEFIB A/

## (undated) DEVICE — GW EMR FIX EXCHG J STD .035 3MM 260CM

## (undated) DEVICE — SOL IRR H2O BTL 1000ML STRL

## (undated) DEVICE — PK EXTRM UPPR 20

## (undated) DEVICE — BANDAGE,GAUZE,CONFORMING,4"X75",STRL,LF: Brand: MEDLINE

## (undated) DEVICE — NAVVUS CATHETER, P/N 701481-002: Brand: ACIST NAVVUS® CATHETER

## (undated) DEVICE — CLAVICLE STRAP: Brand: DEROYAL

## (undated) DEVICE — CVR PROB ULTRASND GLS STRL

## (undated) DEVICE — INFLATION DEVICE KIT: Brand: ENCORE™ 26 ADVANTAGE KIT

## (undated) DEVICE — SYR LUERLOK 50ML

## (undated) DEVICE — HYBRID CO2 TUBING/CAP SET FOR OLYMPUS® SCOPES & CO2 SOURCE: Brand: ERBE

## (undated) DEVICE — BNDG ELAS ELITE V/CLOSE 4IN 5YD LF STRL

## (undated) DEVICE — GOWN,SIRUS,NON REINFRCD,LARGE,SET IN SL: Brand: MEDLINE

## (undated) DEVICE — SPNG GZ WOVN 4X4IN 12PLY 10/BX STRL

## (undated) DEVICE — RADIFOCUS OPTITORQUE ANGIOGRAPHIC CATHETER: Brand: OPTITORQUE

## (undated) DEVICE — SPNG ENDO BEDSIDE TUB ENZYM

## (undated) DEVICE — BANDAGE,GAUZE,BULKEE II,4.5"X4.1YD,STRL: Brand: MEDLINE

## (undated) DEVICE — GUIDE CATHETER: Brand: MACH1™

## (undated) DEVICE — GRADUATE CONTN 1000ML

## (undated) DEVICE — GLIDESHEATH SLENDER STAINLESS STEEL KIT: Brand: GLIDESHEATH SLENDER

## (undated) DEVICE — LUBE GEL ENDOGLIDE 1.1OZ

## (undated) DEVICE — RUNTHROUGH NS EXTRA FLOPPY PTCA GUIDEWIRE: Brand: RUNTHROUGH

## (undated) DEVICE — TR BAND RADIAL ARTERY COMPRESSION DEVICE: Brand: TR BAND

## (undated) DEVICE — TUBING, SUCTION, 1/4" X 10', STRAIGHT: Brand: MEDLINE

## (undated) DEVICE — OCCLUSIVE GAUZE STRIP,3% BISMUTH TRIBROMOPHENATE IN PETROLATUM BLEND: Brand: XEROFORM

## (undated) DEVICE — BALL PIN JURGAN .062 GRN